# Patient Record
Sex: MALE | Race: WHITE
[De-identification: names, ages, dates, MRNs, and addresses within clinical notes are randomized per-mention and may not be internally consistent; named-entity substitution may affect disease eponyms.]

---

## 2017-08-17 ENCOUNTER — RX ONLY (OUTPATIENT)
Age: 77
Setting detail: RX ONLY
End: 2017-08-17

## 2021-07-27 ENCOUNTER — APPOINTMENT (OUTPATIENT)
Dept: RADIOLOGY | Facility: MEDICAL CENTER | Age: 81
DRG: 189 | End: 2021-07-27
Attending: EMERGENCY MEDICINE
Payer: COMMERCIAL

## 2021-07-27 ENCOUNTER — HOSPITAL ENCOUNTER (INPATIENT)
Facility: MEDICAL CENTER | Age: 81
LOS: 4 days | DRG: 189 | End: 2021-07-31
Attending: EMERGENCY MEDICINE | Admitting: INTERNAL MEDICINE
Payer: COMMERCIAL

## 2021-07-27 DIAGNOSIS — R06.00 DYSPNEA, UNSPECIFIED TYPE: ICD-10-CM

## 2021-07-27 DIAGNOSIS — J96.21 ACUTE ON CHRONIC RESPIRATORY FAILURE WITH HYPOXIA (HCC): ICD-10-CM

## 2021-07-27 DIAGNOSIS — I48.11 LONGSTANDING PERSISTENT ATRIAL FIBRILLATION (HCC): ICD-10-CM

## 2021-07-27 DIAGNOSIS — J96.01 ACUTE RESPIRATORY FAILURE WITH HYPOXIA (HCC): ICD-10-CM

## 2021-07-27 DIAGNOSIS — G47.33 OSA (OBSTRUCTIVE SLEEP APNEA): ICD-10-CM

## 2021-07-27 DIAGNOSIS — J81.0 ACUTE PULMONARY EDEMA (HCC): ICD-10-CM

## 2021-07-27 PROBLEM — J44.1 ACUTE EXACERBATION OF CHRONIC OBSTRUCTIVE PULMONARY DISEASE (COPD) (HCC): Status: ACTIVE | Noted: 2021-07-27

## 2021-07-27 LAB
ALBUMIN SERPL BCP-MCNC: 4.3 G/DL (ref 3.2–4.9)
ALBUMIN/GLOB SERPL: 1.6 G/DL
ALP SERPL-CCNC: 133 U/L (ref 30–99)
ALT SERPL-CCNC: 16 U/L (ref 2–50)
ANION GAP SERPL CALC-SCNC: 17 MMOL/L (ref 7–16)
AST SERPL-CCNC: 17 U/L (ref 12–45)
B-OH-BUTYR SERPL-MCNC: 0.58 MMOL/L (ref 0.02–0.27)
BASE EXCESS BLDA CALC-SCNC: -6 MMOL/L (ref -4–3)
BASOPHILS # BLD AUTO: 1.4 % (ref 0–1.8)
BASOPHILS # BLD: 0.18 K/UL (ref 0–0.12)
BILIRUB SERPL-MCNC: 1.5 MG/DL (ref 0.1–1.5)
BODY TEMPERATURE: ABNORMAL DEGREES
BUN SERPL-MCNC: 16 MG/DL (ref 8–22)
CALCIUM SERPL-MCNC: 8.7 MG/DL (ref 8.5–10.5)
CHLORIDE SERPL-SCNC: 105 MMOL/L (ref 96–112)
CO2 BLDA-SCNC: 20 MMOL/L (ref 20–33)
CO2 SERPL-SCNC: 17 MMOL/L (ref 20–33)
CREAT SERPL-MCNC: 1.02 MG/DL (ref 0.5–1.4)
DELSYS IDSYS: ABNORMAL
EKG IMPRESSION: NORMAL
EOSINOPHIL # BLD AUTO: 0.17 K/UL (ref 0–0.51)
EOSINOPHIL NFR BLD: 1.4 % (ref 0–6.9)
ERYTHROCYTE [DISTWIDTH] IN BLOOD BY AUTOMATED COUNT: 52.8 FL (ref 35.9–50)
FLUAV RNA SPEC QL NAA+PROBE: NEGATIVE
FLUBV RNA SPEC QL NAA+PROBE: NEGATIVE
GLOBULIN SER CALC-MCNC: 2.7 G/DL (ref 1.9–3.5)
GLUCOSE BLD-MCNC: 233 MG/DL (ref 65–99)
GLUCOSE SERPL-MCNC: 262 MG/DL (ref 65–99)
HCO3 BLDA-SCNC: 19.2 MMOL/L (ref 17–25)
HCT VFR BLD AUTO: 46.6 % (ref 42–52)
HGB BLD-MCNC: 14.9 G/DL (ref 14–18)
HOROWITZ INDEX BLDA+IHG-RTO: 228 MM[HG]
IMM GRANULOCYTES # BLD AUTO: 0.07 K/UL (ref 0–0.11)
IMM GRANULOCYTES NFR BLD AUTO: 0.6 % (ref 0–0.9)
LACTATE BLD-SCNC: 2.1 MMOL/L (ref 0.5–2)
LYMPHOCYTES # BLD AUTO: 3.54 K/UL (ref 1–4.8)
LYMPHOCYTES NFR BLD: 28.3 % (ref 22–41)
MCH RBC QN AUTO: 30 PG (ref 27–33)
MCHC RBC AUTO-ENTMCNC: 32 G/DL (ref 33.7–35.3)
MCV RBC AUTO: 93.8 FL (ref 81.4–97.8)
MONOCYTES # BLD AUTO: 1.04 K/UL (ref 0–0.85)
MONOCYTES NFR BLD AUTO: 8.3 % (ref 0–13.4)
NEUTROPHILS # BLD AUTO: 7.49 K/UL (ref 1.82–7.42)
NEUTROPHILS NFR BLD: 60 % (ref 44–72)
NRBC # BLD AUTO: 0 K/UL
NRBC BLD-RTO: 0 /100 WBC
NT-PROBNP SERPL IA-MCNC: 1049 PG/ML (ref 0–125)
O2/TOTAL GAS SETTING VFR VENT: 100 %
PCO2 BLDA: 38 MMHG (ref 26–37)
PH BLDA: 7.31 [PH] (ref 7.4–7.5)
PLATELET # BLD AUTO: 318 K/UL (ref 164–446)
PMV BLD AUTO: 10.8 FL (ref 9–12.9)
PO2 BLDA: 228 MMHG (ref 64–87)
POTASSIUM SERPL-SCNC: 4.4 MMOL/L (ref 3.6–5.5)
PROT SERPL-MCNC: 7 G/DL (ref 6–8.2)
RBC # BLD AUTO: 4.97 M/UL (ref 4.7–6.1)
RSV RNA SPEC QL NAA+PROBE: NEGATIVE
SAO2 % BLDA: 100 % (ref 93–99)
SARS-COV-2 RNA RESP QL NAA+PROBE: NOTDETECTED
SODIUM SERPL-SCNC: 139 MMOL/L (ref 135–145)
SPECIMEN DRAWN FROM PATIENT: ABNORMAL
SPECIMEN SOURCE: NORMAL
TROPONIN T SERPL-MCNC: 14 NG/L (ref 6–19)
WBC # BLD AUTO: 12.5 K/UL (ref 4.8–10.8)

## 2021-07-27 PROCEDURE — 80053 COMPREHEN METABOLIC PANEL: CPT

## 2021-07-27 PROCEDURE — 94640 AIRWAY INHALATION TREATMENT: CPT

## 2021-07-27 PROCEDURE — 700101 HCHG RX REV CODE 250: Performed by: EMERGENCY MEDICINE

## 2021-07-27 PROCEDURE — 5A09357 ASSISTANCE WITH RESPIRATORY VENTILATION, LESS THAN 24 CONSECUTIVE HOURS, CONTINUOUS POSITIVE AIRWAY PRESSURE: ICD-10-PCS | Performed by: INTERNAL MEDICINE

## 2021-07-27 PROCEDURE — 83605 ASSAY OF LACTIC ACID: CPT

## 2021-07-27 PROCEDURE — C9803 HOPD COVID-19 SPEC COLLECT: HCPCS | Performed by: EMERGENCY MEDICINE

## 2021-07-27 PROCEDURE — 99291 CRITICAL CARE FIRST HOUR: CPT | Performed by: INTERNAL MEDICINE

## 2021-07-27 PROCEDURE — 700102 HCHG RX REV CODE 250 W/ 637 OVERRIDE(OP): Performed by: INTERNAL MEDICINE

## 2021-07-27 PROCEDURE — A9270 NON-COVERED ITEM OR SERVICE: HCPCS | Performed by: INTERNAL MEDICINE

## 2021-07-27 PROCEDURE — 700101 HCHG RX REV CODE 250: Performed by: INTERNAL MEDICINE

## 2021-07-27 PROCEDURE — 96367 TX/PROPH/DG ADDL SEQ IV INF: CPT

## 2021-07-27 PROCEDURE — 700111 HCHG RX REV CODE 636 W/ 250 OVERRIDE (IP): Performed by: EMERGENCY MEDICINE

## 2021-07-27 PROCEDURE — 700105 HCHG RX REV CODE 258: Performed by: EMERGENCY MEDICINE

## 2021-07-27 PROCEDURE — 96376 TX/PRO/DX INJ SAME DRUG ADON: CPT

## 2021-07-27 PROCEDURE — 82962 GLUCOSE BLOOD TEST: CPT

## 2021-07-27 PROCEDURE — 700111 HCHG RX REV CODE 636 W/ 250 OVERRIDE (IP)

## 2021-07-27 PROCEDURE — 84484 ASSAY OF TROPONIN QUANT: CPT

## 2021-07-27 PROCEDURE — 96375 TX/PRO/DX INJ NEW DRUG ADDON: CPT

## 2021-07-27 PROCEDURE — 96365 THER/PROPH/DIAG IV INF INIT: CPT

## 2021-07-27 PROCEDURE — 700111 HCHG RX REV CODE 636 W/ 250 OVERRIDE (IP): Performed by: INTERNAL MEDICINE

## 2021-07-27 PROCEDURE — 770022 HCHG ROOM/CARE - ICU (200)

## 2021-07-27 PROCEDURE — 93005 ELECTROCARDIOGRAM TRACING: CPT | Performed by: EMERGENCY MEDICINE

## 2021-07-27 PROCEDURE — 36600 WITHDRAWAL OF ARTERIAL BLOOD: CPT

## 2021-07-27 PROCEDURE — 82803 BLOOD GASES ANY COMBINATION: CPT

## 2021-07-27 PROCEDURE — 83880 ASSAY OF NATRIURETIC PEPTIDE: CPT

## 2021-07-27 PROCEDURE — 82010 KETONE BODYS QUAN: CPT

## 2021-07-27 PROCEDURE — 87040 BLOOD CULTURE FOR BACTERIA: CPT | Mod: 91

## 2021-07-27 PROCEDURE — 0241U HCHG SARS-COV-2 COVID-19 NFCT DS RESP RNA 4 TRGT MIC: CPT

## 2021-07-27 PROCEDURE — 99291 CRITICAL CARE FIRST HOUR: CPT

## 2021-07-27 PROCEDURE — 94660 CPAP INITIATION&MGMT: CPT

## 2021-07-27 PROCEDURE — 85025 COMPLETE CBC W/AUTO DIFF WBC: CPT

## 2021-07-27 PROCEDURE — 71045 X-RAY EXAM CHEST 1 VIEW: CPT

## 2021-07-27 RX ORDER — DEXTROSE MONOHYDRATE 25 G/50ML
50 INJECTION, SOLUTION INTRAVENOUS
Status: DISCONTINUED | OUTPATIENT
Start: 2021-07-27 | End: 2021-07-31 | Stop reason: HOSPADM

## 2021-07-27 RX ORDER — ISOSORBIDE MONONITRATE 30 MG/1
60 TABLET, EXTENDED RELEASE ORAL EVERY MORNING
Status: DISCONTINUED | OUTPATIENT
Start: 2021-07-28 | End: 2021-07-31 | Stop reason: HOSPADM

## 2021-07-27 RX ORDER — FINASTERIDE 5 MG/1
5 TABLET, FILM COATED ORAL DAILY
COMMUNITY

## 2021-07-27 RX ORDER — OMEPRAZOLE 20 MG/1
20 CAPSULE, DELAYED RELEASE ORAL DAILY
Status: DISCONTINUED | OUTPATIENT
Start: 2021-07-28 | End: 2021-07-31 | Stop reason: HOSPADM

## 2021-07-27 RX ORDER — TAMSULOSIN HYDROCHLORIDE 0.4 MG/1
0.4 CAPSULE ORAL DAILY
Status: DISCONTINUED | OUTPATIENT
Start: 2021-07-28 | End: 2021-07-31 | Stop reason: HOSPADM

## 2021-07-27 RX ORDER — CARVEDILOL 12.5 MG/1
12.5 TABLET ORAL 2 TIMES DAILY
Status: DISCONTINUED | OUTPATIENT
Start: 2021-07-27 | End: 2021-07-31 | Stop reason: HOSPADM

## 2021-07-27 RX ORDER — GLIPIZIDE 5 MG/1
5 TABLET ORAL
COMMUNITY
End: 2022-01-20

## 2021-07-27 RX ORDER — IPRATROPIUM BROMIDE AND ALBUTEROL SULFATE 2.5; .5 MG/3ML; MG/3ML
3 SOLUTION RESPIRATORY (INHALATION)
Status: DISCONTINUED | OUTPATIENT
Start: 2021-07-27 | End: 2021-07-29

## 2021-07-27 RX ORDER — FUROSEMIDE 10 MG/ML
20 INJECTION INTRAMUSCULAR; INTRAVENOUS ONCE
Status: COMPLETED | OUTPATIENT
Start: 2021-07-27 | End: 2021-07-27

## 2021-07-27 RX ORDER — OMEPRAZOLE 40 MG/1
40 CAPSULE, DELAYED RELEASE ORAL
COMMUNITY

## 2021-07-27 RX ORDER — NITROGLYCERIN 20 MG/100ML
0-200 INJECTION INTRAVENOUS CONTINUOUS
Status: DISCONTINUED | OUTPATIENT
Start: 2021-07-27 | End: 2021-07-29

## 2021-07-27 RX ORDER — CARVEDILOL 12.5 MG/1
12.5 TABLET ORAL 2 TIMES DAILY
COMMUNITY

## 2021-07-27 RX ORDER — IPRATROPIUM BROMIDE AND ALBUTEROL SULFATE 2.5; .5 MG/3ML; MG/3ML
3 SOLUTION RESPIRATORY (INHALATION)
Status: DISCONTINUED | OUTPATIENT
Start: 2021-07-27 | End: 2021-07-31 | Stop reason: HOSPADM

## 2021-07-27 RX ORDER — INSULIN LISPRO 100 [IU]/ML
2-9 INJECTION, SOLUTION INTRAVENOUS; SUBCUTANEOUS EVERY 6 HOURS
Status: DISCONTINUED | OUTPATIENT
Start: 2021-07-28 | End: 2021-07-31 | Stop reason: HOSPADM

## 2021-07-27 RX ORDER — AZITHROMYCIN 250 MG/1
500 TABLET, FILM COATED ORAL DAILY
Status: DISCONTINUED | OUTPATIENT
Start: 2021-07-28 | End: 2021-07-29

## 2021-07-27 RX ORDER — FINASTERIDE 5 MG/1
5 TABLET, FILM COATED ORAL DAILY
Status: DISCONTINUED | OUTPATIENT
Start: 2021-07-28 | End: 2021-07-31 | Stop reason: HOSPADM

## 2021-07-27 RX ORDER — METHYLPREDNISOLONE SODIUM SUCCINATE 125 MG/2ML
62.5 INJECTION, POWDER, LYOPHILIZED, FOR SOLUTION INTRAMUSCULAR; INTRAVENOUS EVERY 12 HOURS
Status: DISCONTINUED | OUTPATIENT
Start: 2021-07-27 | End: 2021-07-29

## 2021-07-27 RX ORDER — FUROSEMIDE 10 MG/ML
40 INJECTION INTRAMUSCULAR; INTRAVENOUS ONCE
Status: COMPLETED | OUTPATIENT
Start: 2021-07-27 | End: 2021-07-27

## 2021-07-27 RX ORDER — ATORVASTATIN CALCIUM 40 MG/1
40 TABLET, FILM COATED ORAL NIGHTLY
Status: DISCONTINUED | OUTPATIENT
Start: 2021-07-27 | End: 2021-07-31 | Stop reason: HOSPADM

## 2021-07-27 RX ORDER — TAMSULOSIN HYDROCHLORIDE 0.4 MG/1
0.4 CAPSULE ORAL DAILY
COMMUNITY
End: 2022-01-20

## 2021-07-27 RX ORDER — AMLODIPINE BESYLATE 10 MG/1
10 TABLET ORAL
Status: DISCONTINUED | OUTPATIENT
Start: 2021-07-28 | End: 2021-07-31 | Stop reason: HOSPADM

## 2021-07-27 RX ORDER — NITROGLYCERIN 20 MG/100ML
INJECTION INTRAVENOUS
Status: COMPLETED
Start: 2021-07-27 | End: 2021-07-27

## 2021-07-27 RX ORDER — ATORVASTATIN CALCIUM 40 MG/1
40 TABLET, FILM COATED ORAL NIGHTLY
COMMUNITY

## 2021-07-27 RX ORDER — AZITHROMYCIN 500 MG/5ML
500 INJECTION, POWDER, LYOPHILIZED, FOR SOLUTION INTRAVENOUS ONCE
Status: COMPLETED | OUTPATIENT
Start: 2021-07-27 | End: 2021-07-27

## 2021-07-27 RX ADMIN — APIXABAN 5 MG: 5 TABLET, FILM COATED ORAL at 23:27

## 2021-07-27 RX ADMIN — NITROGLYCERIN 50 MCG/MIN: 20 INJECTION INTRAVENOUS at 17:55

## 2021-07-27 RX ADMIN — FUROSEMIDE 40 MG: 10 INJECTION, SOLUTION INTRAMUSCULAR; INTRAVENOUS at 21:02

## 2021-07-27 RX ADMIN — FUROSEMIDE 20 MG: 10 INJECTION, SOLUTION INTRAVENOUS at 19:24

## 2021-07-27 RX ADMIN — CARVEDILOL 12.5 MG: 12.5 TABLET, FILM COATED ORAL at 23:27

## 2021-07-27 RX ADMIN — ATORVASTATIN CALCIUM 40 MG: 40 TABLET, FILM COATED ORAL at 23:27

## 2021-07-27 RX ADMIN — AZITHROMYCIN MONOHYDRATE 500 MG: 500 INJECTION, POWDER, LYOPHILIZED, FOR SOLUTION INTRAVENOUS at 18:56

## 2021-07-27 RX ADMIN — IPRATROPIUM BROMIDE AND ALBUTEROL SULFATE 3 ML: .5; 2.5 SOLUTION RESPIRATORY (INHALATION) at 23:14

## 2021-07-27 RX ADMIN — METHYLPREDNISOLONE SODIUM SUCCINATE 62.5 MG: 125 INJECTION, POWDER, FOR SOLUTION INTRAMUSCULAR; INTRAVENOUS at 21:02

## 2021-07-27 RX ADMIN — CEFTRIAXONE SODIUM 2 G: 10 INJECTION, POWDER, FOR SOLUTION INTRAVENOUS at 18:04

## 2021-07-27 ASSESSMENT — LIFESTYLE VARIABLES
AVERAGE NUMBER OF DAYS PER WEEK YOU HAVE A DRINK CONTAINING ALCOHOL: 3
TOTAL SCORE: 0
EVER HAD A DRINK FIRST THING IN THE MORNING TO STEADY YOUR NERVES TO GET RID OF A HANGOVER: NO
HOW MANY TIMES IN THE PAST YEAR HAVE YOU HAD 5 OR MORE DRINKS IN A DAY: 2
HAVE PEOPLE ANNOYED YOU BY CRITICIZING YOUR DRINKING: NO
DO YOU DRINK ALCOHOL: NO
TOTAL SCORE: 0
ALCOHOL_USE: YES
ON A TYPICAL DAY WHEN YOU DRINK ALCOHOL HOW MANY DRINKS DO YOU HAVE: 0
CONSUMPTION TOTAL: POSITIVE
TOTAL SCORE: 0
DOES PATIENT WANT TO STOP DRINKING: NO
HAVE YOU EVER FELT YOU SHOULD CUT DOWN ON YOUR DRINKING: NO
EVER FELT BAD OR GUILTY ABOUT YOUR DRINKING: NO

## 2021-07-27 ASSESSMENT — PATIENT HEALTH QUESTIONNAIRE - PHQ9
1. LITTLE INTEREST OR PLEASURE IN DOING THINGS: NOT AT ALL
2. FEELING DOWN, DEPRESSED, IRRITABLE, OR HOPELESS: NOT AT ALL
SUM OF ALL RESPONSES TO PHQ9 QUESTIONS 1 AND 2: 0
2. FEELING DOWN, DEPRESSED, IRRITABLE, OR HOPELESS: NOT AT ALL
SUM OF ALL RESPONSES TO PHQ9 QUESTIONS 1 AND 2: 0
1. LITTLE INTEREST OR PLEASURE IN DOING THINGS: NOT AT ALL

## 2021-07-27 ASSESSMENT — ENCOUNTER SYMPTOMS
FEVER: 0
LOSS OF CONSCIOUSNESS: 0
COUGH: 0
VOMITING: 0
ORTHOPNEA: 1
SHORTNESS OF BREATH: 1
PALPITATIONS: 0
CHILLS: 0
NAUSEA: 0
ABDOMINAL PAIN: 0
WEAKNESS: 0

## 2021-07-27 ASSESSMENT — COGNITIVE AND FUNCTIONAL STATUS - GENERAL
SUGGESTED CMS G CODE MODIFIER MOBILITY: CI
MOBILITY SCORE: 23
SUGGESTED CMS G CODE MODIFIER DAILY ACTIVITY: CH
DAILY ACTIVITIY SCORE: 24
CLIMB 3 TO 5 STEPS WITH RAILING: A LITTLE

## 2021-07-27 ASSESSMENT — COPD QUESTIONNAIRES
DURING THE PAST 4 WEEKS HOW MUCH DID YOU FEEL SHORT OF BREATH: SOME OF THE TIME
COPD SCREENING SCORE: 6
HAVE YOU SMOKED AT LEAST 100 CIGARETTES IN YOUR ENTIRE LIFE: YES
DO YOU EVER COUGH UP ANY MUCUS OR PHLEGM?: NO/ONLY WITH OCCASIONAL COLDS OR INFECTIONS

## 2021-07-27 ASSESSMENT — PULMONARY FUNCTION TESTS: EPAP_CMH2O: 5

## 2021-07-27 ASSESSMENT — FIBROSIS 4 INDEX: FIB4 SCORE: 1.06

## 2021-07-28 ENCOUNTER — APPOINTMENT (OUTPATIENT)
Dept: CARDIOLOGY | Facility: MEDICAL CENTER | Age: 81
DRG: 189 | End: 2021-07-28
Attending: INTERNAL MEDICINE
Payer: COMMERCIAL

## 2021-07-28 PROBLEM — Z86.711 HX OF PULMONARY EMBOLUS: Status: ACTIVE | Noted: 2021-07-28

## 2021-07-28 PROBLEM — J96.21 ACUTE ON CHRONIC RESPIRATORY FAILURE WITH HYPOXIA (HCC): Status: ACTIVE | Noted: 2021-07-27

## 2021-07-28 LAB
ANION GAP SERPL CALC-SCNC: 18 MMOL/L (ref 7–16)
BUN SERPL-MCNC: 20 MG/DL (ref 8–22)
CALCIUM SERPL-MCNC: 9 MG/DL (ref 8.5–10.5)
CHLORIDE SERPL-SCNC: 103 MMOL/L (ref 96–112)
CO2 SERPL-SCNC: 18 MMOL/L (ref 20–33)
CREAT SERPL-MCNC: 1.05 MG/DL (ref 0.5–1.4)
ERYTHROCYTE [DISTWIDTH] IN BLOOD BY AUTOMATED COUNT: 50.1 FL (ref 35.9–50)
GLUCOSE BLD-MCNC: 147 MG/DL (ref 65–99)
GLUCOSE BLD-MCNC: 156 MG/DL (ref 65–99)
GLUCOSE BLD-MCNC: 202 MG/DL (ref 65–99)
GLUCOSE BLD-MCNC: 259 MG/DL (ref 65–99)
GLUCOSE SERPL-MCNC: 161 MG/DL (ref 65–99)
HCT VFR BLD AUTO: 42.5 % (ref 42–52)
HGB BLD-MCNC: 14.1 G/DL (ref 14–18)
LV EJECT FRACT  99904: 55
LV EJECT FRACT MOD 2C 99903: 72.53
LV EJECT FRACT MOD 4C 99902: 44.84
LV EJECT FRACT MOD BP 99901: 58.74
MAGNESIUM SERPL-MCNC: 1.7 MG/DL (ref 1.5–2.5)
MCH RBC QN AUTO: 30 PG (ref 27–33)
MCHC RBC AUTO-ENTMCNC: 33.2 G/DL (ref 33.7–35.3)
MCV RBC AUTO: 90.4 FL (ref 81.4–97.8)
PLATELET # BLD AUTO: 184 K/UL (ref 164–446)
PMV BLD AUTO: 10.6 FL (ref 9–12.9)
POTASSIUM SERPL-SCNC: 3.9 MMOL/L (ref 3.6–5.5)
RBC # BLD AUTO: 4.7 M/UL (ref 4.7–6.1)
SODIUM SERPL-SCNC: 139 MMOL/L (ref 135–145)
WBC # BLD AUTO: 6.7 K/UL (ref 4.8–10.8)

## 2021-07-28 PROCEDURE — 770022 HCHG ROOM/CARE - ICU (200)

## 2021-07-28 PROCEDURE — 700102 HCHG RX REV CODE 250 W/ 637 OVERRIDE(OP): Performed by: INTERNAL MEDICINE

## 2021-07-28 PROCEDURE — A9270 NON-COVERED ITEM OR SERVICE: HCPCS | Performed by: INTERNAL MEDICINE

## 2021-07-28 PROCEDURE — 93306 TTE W/DOPPLER COMPLETE: CPT

## 2021-07-28 PROCEDURE — 94640 AIRWAY INHALATION TREATMENT: CPT

## 2021-07-28 PROCEDURE — 83735 ASSAY OF MAGNESIUM: CPT

## 2021-07-28 PROCEDURE — 700111 HCHG RX REV CODE 636 W/ 250 OVERRIDE (IP): Performed by: INTERNAL MEDICINE

## 2021-07-28 PROCEDURE — 85027 COMPLETE CBC AUTOMATED: CPT

## 2021-07-28 PROCEDURE — 80048 BASIC METABOLIC PNL TOTAL CA: CPT

## 2021-07-28 PROCEDURE — 94664 DEMO&/EVAL PT USE INHALER: CPT

## 2021-07-28 PROCEDURE — 700101 HCHG RX REV CODE 250: Performed by: INTERNAL MEDICINE

## 2021-07-28 PROCEDURE — 82962 GLUCOSE BLOOD TEST: CPT

## 2021-07-28 PROCEDURE — 93306 TTE W/DOPPLER COMPLETE: CPT | Mod: 26 | Performed by: INTERNAL MEDICINE

## 2021-07-28 PROCEDURE — 99291 CRITICAL CARE FIRST HOUR: CPT | Performed by: INTERNAL MEDICINE

## 2021-07-28 RX ORDER — POTASSIUM CHLORIDE 20 MEQ/1
40 TABLET, EXTENDED RELEASE ORAL ONCE
Status: COMPLETED | OUTPATIENT
Start: 2021-07-28 | End: 2021-07-28

## 2021-07-28 RX ORDER — MAGNESIUM SULFATE HEPTAHYDRATE 40 MG/ML
2 INJECTION, SOLUTION INTRAVENOUS ONCE
Status: COMPLETED | OUTPATIENT
Start: 2021-07-28 | End: 2021-07-28

## 2021-07-28 RX ORDER — FUROSEMIDE 10 MG/ML
40 INJECTION INTRAMUSCULAR; INTRAVENOUS
Status: DISCONTINUED | OUTPATIENT
Start: 2021-07-28 | End: 2021-07-29

## 2021-07-28 RX ADMIN — INSULIN LISPRO 3 UNITS: 100 INJECTION, SOLUTION INTRAVENOUS; SUBCUTANEOUS at 17:50

## 2021-07-28 RX ADMIN — IPRATROPIUM BROMIDE AND ALBUTEROL SULFATE 3 ML: .5; 2.5 SOLUTION RESPIRATORY (INHALATION) at 19:53

## 2021-07-28 RX ADMIN — OMEPRAZOLE 20 MG: 20 CAPSULE, DELAYED RELEASE ORAL at 05:49

## 2021-07-28 RX ADMIN — IPRATROPIUM BROMIDE AND ALBUTEROL SULFATE 3 ML: .5; 2.5 SOLUTION RESPIRATORY (INHALATION) at 02:57

## 2021-07-28 RX ADMIN — POTASSIUM CHLORIDE 40 MEQ: 1500 TABLET, EXTENDED RELEASE ORAL at 11:29

## 2021-07-28 RX ADMIN — INSULIN GLARGINE 18 UNITS: 100 INJECTION, SOLUTION SUBCUTANEOUS at 17:49

## 2021-07-28 RX ADMIN — IPRATROPIUM BROMIDE AND ALBUTEROL SULFATE 3 ML: .5; 2.5 SOLUTION RESPIRATORY (INHALATION) at 10:03

## 2021-07-28 RX ADMIN — CARVEDILOL 12.5 MG: 12.5 TABLET, FILM COATED ORAL at 05:49

## 2021-07-28 RX ADMIN — MAGNESIUM SULFATE 2 G: 2 INJECTION INTRAVENOUS at 11:33

## 2021-07-28 RX ADMIN — INSULIN LISPRO 2 UNITS: 100 INJECTION, SOLUTION INTRAVENOUS; SUBCUTANEOUS at 06:03

## 2021-07-28 RX ADMIN — FUROSEMIDE 40 MG: 10 INJECTION, SOLUTION INTRAMUSCULAR; INTRAVENOUS at 11:39

## 2021-07-28 RX ADMIN — CARVEDILOL 12.5 MG: 12.5 TABLET, FILM COATED ORAL at 17:49

## 2021-07-28 RX ADMIN — AMLODIPINE BESYLATE 10 MG: 10 TABLET ORAL at 05:50

## 2021-07-28 RX ADMIN — IPRATROPIUM BROMIDE AND ALBUTEROL SULFATE 3 ML: .5; 2.5 SOLUTION RESPIRATORY (INHALATION) at 06:31

## 2021-07-28 RX ADMIN — METHYLPREDNISOLONE SODIUM SUCCINATE 62.5 MG: 125 INJECTION, POWDER, FOR SOLUTION INTRAMUSCULAR; INTRAVENOUS at 05:50

## 2021-07-28 RX ADMIN — ATORVASTATIN CALCIUM 40 MG: 40 TABLET, FILM COATED ORAL at 21:35

## 2021-07-28 RX ADMIN — INSULIN GLARGINE 18 UNITS: 100 INJECTION, SOLUTION SUBCUTANEOUS at 00:13

## 2021-07-28 RX ADMIN — TAMSULOSIN HYDROCHLORIDE 0.4 MG: 0.4 CAPSULE ORAL at 05:49

## 2021-07-28 RX ADMIN — APIXABAN 5 MG: 5 TABLET, FILM COATED ORAL at 05:49

## 2021-07-28 RX ADMIN — INSULIN LISPRO 5 UNITS: 100 INJECTION, SOLUTION INTRAVENOUS; SUBCUTANEOUS at 11:44

## 2021-07-28 RX ADMIN — METHYLPREDNISOLONE SODIUM SUCCINATE 62.5 MG: 125 INJECTION, POWDER, FOR SOLUTION INTRAMUSCULAR; INTRAVENOUS at 17:49

## 2021-07-28 RX ADMIN — IPRATROPIUM BROMIDE AND ALBUTEROL SULFATE 3 ML: .5; 2.5 SOLUTION RESPIRATORY (INHALATION) at 22:19

## 2021-07-28 RX ADMIN — APIXABAN 5 MG: 5 TABLET, FILM COATED ORAL at 17:49

## 2021-07-28 RX ADMIN — ISOSORBIDE MONONITRATE 60 MG: 30 TABLET, EXTENDED RELEASE ORAL at 05:48

## 2021-07-28 RX ADMIN — FINASTERIDE 5 MG: 5 TABLET, FILM COATED ORAL at 05:49

## 2021-07-28 RX ADMIN — AZITHROMYCIN MONOHYDRATE 500 MG: 250 TABLET ORAL at 05:49

## 2021-07-28 ASSESSMENT — ENCOUNTER SYMPTOMS
SHORTNESS OF BREATH: 1
DIZZINESS: 0
ABDOMINAL PAIN: 0
NAUSEA: 0
DIARRHEA: 0
FEVER: 0
BACK PAIN: 0
NECK PAIN: 0
EYE PAIN: 0
CHILLS: 0
WHEEZING: 0
EYE DISCHARGE: 0
FOCAL WEAKNESS: 0
NERVOUS/ANXIOUS: 0
ORTHOPNEA: 1
SORE THROAT: 0
COUGH: 0
BRUISES/BLEEDS EASILY: 0
HEADACHES: 0
DEPRESSION: 0
VOMITING: 0

## 2021-07-28 ASSESSMENT — FIBROSIS 4 INDEX: FIB4 SCORE: 1.82

## 2021-07-28 ASSESSMENT — PAIN DESCRIPTION - PAIN TYPE
TYPE: ACUTE PAIN

## 2021-07-28 ASSESSMENT — PATIENT HEALTH QUESTIONNAIRE - PHQ9
2. FEELING DOWN, DEPRESSED, IRRITABLE, OR HOPELESS: NOT AT ALL
SUM OF ALL RESPONSES TO PHQ9 QUESTIONS 1 AND 2: 0
1. LITTLE INTEREST OR PLEASURE IN DOING THINGS: NOT AT ALL

## 2021-07-28 NOTE — ASSESSMENT & PLAN NOTE
? CHF exacerbation vs COPD exacerbation  No recent records, [facility]  Cont HFNC with improved O2 and continue to wean  Cont diuresis with Lasix 40mg IV daily  Check ECHO  May need to consider CT chest to view lung parenchyma-->?emphysema, Hx of PE, ILD  Switch steroids to oral prednisone 40mg with a fast taper  RT protocols  ECHO with moderate aortic insufficiency  ?CT chest without contrast  Would suggest that pulmonary follow patient inpatient and see as an outpatient-->should get PFTs once discharged

## 2021-07-28 NOTE — ED NOTES
Med rec updated and complete.  Allergies reviewed. VIA interview with pt at bedside. Pt denies antibiotic use in last 30 days.        Home pharmacy [phone]             Current Outpatient Medications on File Prior to Encounter   Medication Sig Dispense Refill   • carvedilol (COREG) 12.5 MG Tab Take 12.5 mg by mouth 2 times a day.     • atorvastatin (LIPITOR) 40 MG Tab Take 40 mg by mouth every evening.     • apixaban (ELIQUIS) 5mg Tab Take 5 mg by mouth 2 times a day.     • omeprazole (PRILOSEC) 20 MG delayed-release capsule Take 20 mg by mouth every day.     • tamsulosin (FLOMAX) 0.4 MG capsule Take 0.4 mg by mouth every day.     • glipiZIDE (GLUCOTROL) 5 MG Tab Take 5 mg by mouth before evening meal.     • finasteride (PROSCAR) 5 MG Tab Take 5 mg by mouth every day.     • Empagliflozin 25 MG Tab Take 25 mg by mouth every day.                          • amlodipine (NORVASC) 10 MG TABS TAKE 1 TABLET EVERY EVENING 90 Each 3                               • isosorbide mononitrate SR (IMDUR) 60 MG TB24 Take 1 Tab by mouth every morning. Indications: Chronic Angina Pectoris 90 Each 3

## 2021-07-28 NOTE — ED NOTES
Report to LARON Carrillo. RRT at bedside to assist in transporting pt to [facility].

## 2021-07-28 NOTE — CARE PLAN
Problem: Knowledge Deficit - Standard  Goal: Patient and family/care givers will demonstrate understanding of plan of care, disease process/condition, diagnostic tests and medications  Outcome: Progressing     Problem: Respiratory  Goal: Patient will achieve/maintain optimum respiratory ventilation and gas exchange  Outcome: Progressing     The patient is Watcher - Medium risk of patient condition declining or worsening         Progress made toward(s) clinical / shift goals:  wean off high flow    Patient is not progressing towards the following goals: Remains on high flow

## 2021-07-28 NOTE — PROGRESS NOTES
Critical Care Progress Note    Date of admission  7/27/2021    Chief Complaint  81 y.o. male admitted 7/27/2021 with acute on chronic hypoxic respiratory failure    Hospital Course  Mr. Cook is a 81 year old male with the past medical history significant for a-fib on Apixiban, DVT/PE, hypertension, CKD, chronic hypoxic respiratory failure on 3 lpm NC O2 and dyslipidemia who presented to the ER on 7/27/2021 with complaints of worsening shortness of breath over the past few days that worsened on the day of admission.  EMS was contacted and upon arrival found that the patient's O2 saturation was 70%.  The patient did not tolerate BiPAP, but improved with HFNC.  He was admitted to the ICU for further care and management.      Interval Problem Update  Reviewed last 24 hour events:   - no issues overnight after transferring to ICU   - a/ox4   - a-fib 70-100s   - -140s   - afebrile   - DM diet   - UOP of 1 liters with urinal   - HFNC 30/60%   - CXR(reviewed): bibasilar opacities R>L   - apixiban   - azithromycin for 3 days   - solumedrol 60 mg BID   - K 3.9   - Mg 1.7       Review of Systems  Review of Systems   Constitutional: Positive for malaise/fatigue. Negative for chills and fever.   HENT: Negative for congestion and sore throat.    Eyes: Negative for pain and discharge.   Respiratory: Positive for shortness of breath. Negative for cough and wheezing.    Cardiovascular: Positive for orthopnea.   Gastrointestinal: Negative for abdominal pain, diarrhea, nausea and vomiting.   Genitourinary: Negative for frequency and hematuria.   Musculoskeletal: Negative for back pain and neck pain.   Skin: Negative for rash.   Neurological: Negative for dizziness, focal weakness and headaches.   Endo/Heme/Allergies: Does not bruise/bleed easily.   Psychiatric/Behavioral: Negative for depression. The patient is not nervous/anxious.         Vital Signs for last 24 hours   Temp:  [36 °C (96.8 °F)-36.2 °C (97.2 °F)] 36.1 °C (97  °F)  Pulse:  [74-96] 85  Resp:  [8-38] 17  BP: ()/() 147/82  SpO2:  [88 %-98 %] 94 %    Hemodynamic parameters for last 24 hours       Respiratory Information for the last 24 hours       Physical Exam   Physical Exam  Vitals and nursing note reviewed.   Constitutional:       General: He is not in acute distress.     Appearance: He is ill-appearing. He is not toxic-appearing.      Comments: Speaking in full sentences while on HFNC   HENT:      Head: Normocephalic and atraumatic.      Right Ear: External ear normal.      Left Ear: External ear normal.      Nose: Nose normal. No rhinorrhea.      Comments: HFNC in place     Mouth/Throat:      Mouth: Mucous membranes are moist.      Pharynx: Oropharynx is clear. No oropharyngeal exudate.   Eyes:      General: No scleral icterus.     Conjunctiva/sclera: Conjunctivae normal.      Pupils: Pupils are equal, round, and reactive to light.   Cardiovascular:      Rate and Rhythm: Normal rate and regular rhythm.      Pulses: Normal pulses.      Heart sounds: Normal heart sounds. No murmur heard.     Pulmonary:      Breath sounds: Rales present. No wheezing.      Comments: Tolerating HFNC, bibasilar crackles noted  Chest:      Chest wall: No tenderness.   Abdominal:      General: Bowel sounds are normal. There is no distension.      Palpations: Abdomen is soft.      Tenderness: There is no abdominal tenderness. There is no guarding.   Musculoskeletal:         General: Normal range of motion.      Cervical back: Normal range of motion and neck supple.      Right lower leg: No edema.      Left lower leg: No edema.   Lymphadenopathy:      Cervical: No cervical adenopathy.   Skin:     General: Skin is warm and dry.      Capillary Refill: Capillary refill takes less than 2 seconds.      Findings: No rash.   Neurological:      Mental Status: He is alert and oriented to person, place, and time.      Cranial Nerves: No cranial nerve deficit.      Sensory: No sensory deficit.       Motor: No weakness.   Psychiatric:         Mood and Affect: Mood normal.         Behavior: Behavior normal.         Thought Content: Thought content normal.         Medications  Current Facility-Administered Medications   Medication Dose Route Frequency Provider Last Rate Last Admin   • nitroglycerin 50 mg in D5W 250 ml infusion  0-200 mcg/min Intravenous Continuous Daniel Wyatt M.D.   Stopped at 07/27/21 1835   • methylPREDNISolone sod succ (SOLU-MEDROL) 125 MG injection 62.5 mg  62.5 mg Intravenous Q12HRS Grant Artis, D.O.   62.5 mg at 07/28/21 0550   • azithromycin (ZITHROMAX) tablet 500 mg  500 mg Oral DAILY Grant Artis D.O.   500 mg at 07/28/21 0549   • amLODIPine (NORVASC) tablet 10 mg  10 mg Oral Q DAY Reggie Matson M.D.   10 mg at 07/28/21 0550   • apixaban (ELIQUIS) tablet 5 mg  5 mg Oral BID Reggie Matson M.D.   5 mg at 07/28/21 0549   • atorvastatin (LIPITOR) tablet 40 mg  40 mg Oral Nightly Reggie Matson M.D.   40 mg at 07/27/21 2327   • carvedilol (COREG) tablet 12.5 mg  12.5 mg Oral BID Reggie Matson M.D.   12.5 mg at 07/28/21 0549   • finasteride (PROSCAR) tablet 5 mg  5 mg Oral DAILY Reggie Mtason M.D.   5 mg at 07/28/21 0549   • isosorbide mononitrate SR (IMDUR) tablet 60 mg  60 mg Oral QAM Reggie Matson M.D.   60 mg at 07/28/21 0548   • omeprazole (PRILOSEC) capsule 20 mg  20 mg Oral DAILY Reggie Matson M.D.   20 mg at 07/28/21 0549   • tamsulosin (FLOMAX) capsule 0.4 mg  0.4 mg Oral DAILY Reggie Matson M.D.   0.4 mg at 07/28/21 0549   • ipratropium-albuterol (DUONEB) nebulizer solution  3 mL Nebulization Q4HRS (RT) Grant Artis D.O.   3 mL at 07/28/21 0631   • ipratropium-albuterol (DUONEB) nebulizer solution  3 mL Nebulization Q2HRS PRN (RT) Grant Artis D.O.       • insulin glargine (Semglee) injection  0.2 Units/kg/day Subcutaneous Q EVENING Reggie Matson M.D.   18 Units at 07/28/21 0013    And   • insulin lispro (AdmeLOG) injection  2-9 Units Subcutaneous Q6HRS Reggie CABRERA  ROM Matson   2 Units at 07/28/21 0603    And   • glucose 4 g chewable tablet 16 g  16 g Oral Q15 MIN PRN Reggie Matson M.D.        And   • dextrose 50% (D50W) injection 50 mL  50 mL Intravenous Q15 MIN PRN Reggie Matson M.D.           Fluids    Intake/Output Summary (Last 24 hours) at 7/28/2021 0700  Last data filed at 7/28/2021 0600  Gross per 24 hour   Intake --   Output 2300 ml   Net -2300 ml       Laboratory  Recent Labs     07/27/21  1833   ISTATAPH 7.313*   ISTATAPCO2 38.0*   ISTATAPO2 228*   ISTATATCO2 20   MOVHKHD6LLN 100*   ISTATARTHCO3 19.2   ISTATARTBE -6*   ISTATTEMP see below   ISTATFIO2 100   ISTATSPEC Arterial         Recent Labs     07/27/21  1750 07/28/21  0520   SODIUM 139 139   POTASSIUM 4.4 3.9   CHLORIDE 105 103   CO2 17* 18*   BUN 16 20   CREATININE 1.02 1.05   MAGNESIUM  --  1.7   CALCIUM 8.7 9.0     Recent Labs     07/27/21  1750 07/28/21  0520   ALTSGPT 16  --    ASTSGOT 17  --    ALKPHOSPHAT 133*  --    TBILIRUBIN 1.5  --    GLUCOSE 262* 161*     Recent Labs     07/27/21  1750 07/28/21  0520   WBC 12.5* 6.7   NEUTSPOLYS 60.00  --    LYMPHOCYTES 28.30  --    MONOCYTES 8.30  --    EOSINOPHILS 1.40  --    BASOPHILS 1.40  --    ASTSGOT 17  --    ALTSGPT 16  --    ALKPHOSPHAT 133*  --    TBILIRUBIN 1.5  --      Recent Labs     07/27/21  1750 07/28/21  0520   RBC 4.97 4.70   HEMOGLOBIN 14.9 14.1   HEMATOCRIT 46.6 42.5   PLATELETCT 318 184       Imaging  Reviewed    Assessment/Plan  Hx of pulmonary embolus- (present on admission)  Assessment & Plan  Hx of   Cont Apixiban    Acute on chronic respiratory failure with hypoxia (HCC)- (present on admission)  Assessment & Plan  ? CHF exacerbation vs COPD exacerbation  No recent records, [facility]  Cont HFNC at 30/60% and wean if tolerates  Cont diuresis with Lasix 40mg IV daily  Check ECHO  May need to consider CT chest to view lung parenchyma-->?emphysema, Hx of PE, ILD  Cont steroids, empiric abx, RT protocols,  Duonebs    A-fib (HCC)- (present on admission)  Assessment & Plan  Rate controlled  Cont Apixiban    Mixed hyperlipidemia- (present on admission)  Assessment & Plan  Cont home statin    Type II or unspecified type diabetes mellitus with renal manifestations, not stated as uncontrolled(250.40)  Assessment & Plan  BG goals 140-180s  Lantus 18 units nightly  ISS    Essential hypertension, malignant- (present on admission)  Assessment & Plan  Cont home medications of amlodipine, carvedilol, imdur       VTE:  NOAC  Ulcer: Not Indicated  Lines: PIVs    I have performed a physical exam and reviewed and updated ROS and Plan today (7/28/2021). In review of yesterday's note (7/27/2021), there are no changes except as documented above.     Discussed patient condition and risk of morbidity and/or mortality with RN, RT, Pharmacy, , Charge nurse / hot rounds and Patient    Patient remains critically ill with acute on chronic hypoxic respiratory failure requiring active titration of high flow nasal cannula to maintain oxygen saturations greater than 92%.  We will start aggressive diuresis as well as continue antibiotics and duo nebs.  Patient remains at high risk of clinical deterioration, worsening vital organ dysfunction, and death without the above critical care interventions.    Critical care time = 41 minutes in directly providing and coordinating critical care and extensive data review.  No time overlap and excludes procedures.

## 2021-07-28 NOTE — RESPIRATORY CARE
COPD EDUCATION by COPD CLINICAL EDUCATOR  2021  at  9:42 AM by Olga Lidia Montano, RRT     Patient interviewed by COPD education team.  Patient unable to participate in full program.  A short intervention has been conducted.  A comprehensive packet including information about COPD, types of treatments to manage their disease and safe home Oxygen usage was provided and reviewed with patient at the bedside. Patient states he was a  for occupation and wore protective gear. Patient also states he used to have an albuterol inhaler without a spacer and used it when he was short of breath prior to admission, however it was .     COPD Screen  COPD Risk Screening  Do you have a history of COPD?: Yes  Do you have a Pulmonologist?: No  COPD Population Screener  During the past 4 weeks, how much did you feel short of breath?: Some of the time  Do you ever cough up any mucus or phlegm?: No/only with occasional colds or infections  In the past 12 months, you do less than you used to because of your breathing problems: Agree  Have you smoked at least 100 cigarettes in your entire life?: Yes  How old are you?: 60+  COPD Screening Score: 6  COPD Coordinator Recommended: Yes    COPD Assessment  COPD Clinical Specialists ONLY  COPD Education Initiated: Yes--Short Intervention (patient unsure if he has COPD. information provided and voalted MD for clarity on exacerbation. [facility],)  DME Company: [employer]  DME Equipment Type: concentrator/oxygen  [facility]  [facility]- patient has medic[facility] insurance and not interested in follow up with [facility] Pulmonary/remote monitoring  Referrals Initiated: Yes  Pulmonary Rehab:  (notified MD for order set)  Smoking Cessation: N/A (quit smoking in )  Hospice: N/A  Home Health Care: Declined  [facility] Outreach: N/A  [facility]: N/A  [facility]  Private In-Home Care Agency: N/A  Is this a COPD exacerbation  "patient?: Yes  $ Demo/Eval of SVN's, MDI's and Aerosols: Yes  (OP) Pulmonary Function Testing:  (patient states [facility]. told it was \"normal\")    Meds to Beds  Would the patient like to opt in for Bedside Medication Delivery at Discharge?: Yes, interested     MY COPD ACTION PLAN     It is recommended that patients and physicians /healthcare providers complete this action plan together. This plan should be discussed at each physician visit and updated as needed.    The green, yellow and red zones show groups of symptoms of COPD. This list of symptoms is not comprehensive, and you may experience other symptoms. In the \"Actions\" column, your healthcare provider has recommended actions for you to take based on your symptoms.    Patient Name: Oleg Cook   YOB: 1940   Last Updated on: 7/28/2021  9:41 AM   Green Zone:  I am doing well today Actions   •  Usual activitiy and exercise level •  Take daily medications   •  Usual amounts of cough and phlegm/mucus •  Use oxygen as prescribed   •  Sleep well at night •  Continue regular exercise/diet plan   •  Appetite is good •  At all times avoid cigarette smoke, inhaled irritants     Daily Medications (these medications are taken every day):                Yellow Zone:  I am having a bad day or a COPD flare Actions   •  More breathless than usual •  Continue daily medications   •  I have less energy for my daily activities •  Use quick relief inhaler as ordered   •  Increased or thicker phlegm/mucus •  Use oxygen as prescribed   •  Using quick relief inhaler/nebulizer more often •  Get plenty of rest   •  Swelling of ankles more than usual •  Use pursed lip breathing   •  More coughing than usual •  At all times avoid cigarette smoke, inhaled irritants   •  I feel like I have a \"chest cold\"   •  Poor sleep and my symptoms woke me up   •  My appetite is not good   •  My medicine is not helping    •  Call provider immediately if symptoms " don’t improve     Continue daily medications, add rescue medications:   Albuterol 2 Puffs Every 4 hours PRN       Medications to be used during a flare up, (as Discussed with Provider):           Additional Information:  Use with spacer    Red Zone:  I need urgent medical care Actions   •  Severe shortness of breath even at rest •  Call 911 or seek medical care immediately   •  Not able to do any activity because of breathing    •  Fever or shaking chills    •  Feeling confused or very drowsy     •  Chest pains    •  Coughing up blood
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
## 2021-07-28 NOTE — ED NOTES
Chief Complaint   Patient presents with   • Shortness of Breath     x2days     Pt bib ems from home, c/o sob x2days. Pt on 4L nc which family has been increasing from 2L , spo2 77% . He was placed on 15L non re breather . EMS attempted to place pt ot cpap but pt did not tolerate.    at bedside.

## 2021-07-28 NOTE — HOSPITAL COURSE
Mr. Cook is a 81 year old male with the past medical history significant for a-fib on Apixiban, DVT/PE, hypertension, CKD, chronic hypoxic respiratory failure on 3 lpm NC O2 and dyslipidemia who presented to the ER on 7/27/2021 with complaints of worsening shortness of breath over the past few days that worsened on the day of admission.  EMS was contacted and upon arrival found that the patient's O2 saturation was 70%.  The patient did not tolerate BiPAP, but improved with HFNC.  He was admitted to the ICU for further care and management.

## 2021-07-28 NOTE — ED PROVIDER NOTES
ED Provider Note    Scribed for Daniel Wyatt M.D. by Nicole Em. 2021, 5:54 PM.    Primary care provider: Shana Farrell M.D.  Means of arrival: EMS  History obtained from: patient   History limited by: none    CHIEF COMPLAINT  Chief Complaint   Patient presents with   • Shortness of Breath     x2days       HPI  Oleg Cook is a 81 y.o. male who presents to the Emergency Department for shortness of breath onset 2 days ago. Upon arrival of EMS the patient was cyanotic with an oxygen saturation of 70% on his regular 3 L of oxygen. He has a past medical history of CHF, diabetes, hypertension, and high cholesterol. EMS noted audible rales that were improved with treatment of 2 sublingual nitroglycerin. EMS attempted BIPAP, however the patient did not tolerate this. Denies any chest pain, abdominal pain, or fevers.    REVIEW OF SYSTEMS  Pertinent positives include shortness of breath and hypoxia. Pertinent negatives include no chest pain, abdominal pain, or fevers.  All other systems reviewed and negative.    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    SURGICAL HISTORY  patient denies any surgical history    SOCIAL HISTORY  Social History     Tobacco Use   • Smoking status: Former Smoker     Packs/day: 1.00     Years: 20.00     Pack years: 20.00     Types: Cigarettes     Quit date: 1974     Years since quittin.7   • Smokeless tobacco: Never Used   Substance Use  Topics   • Alcohol use: Yes     Alcohol/week: 0.5 oz     Types: 1 drink(s) per week   • Drug use: No      Social History     Substance and Sexual Activity   Drug Use No       FAMILY HISTORY  Family History   Problem Relation Age of Onset   • Stroke Mother         brain anuerysm hemorrhage   • Heart Disease Father         MI   • Hypertension Father    • Cancer Brother         Prostate CA       CURRENT MEDICATIONS  Home Medications    **Home medications have not yet been reviewed for this encounter**         ALLERGIES  No Known Allergies    PHYSICAL EXAM  VITAL SIGNS: Pulse 90   Resp (!) 34   Ht 1.829 m (6')   Wt 90.7 kg (200 lb)   SpO2 88%   BMI 27.12 kg/m²     Constitutional: Well developed, Well nourished, severe distress, Non-toxic appearance.   HENT: Normocephalic, Atraumatic, TMs normal, mucous membranes moist, no erythema, exudates, swelling, or masses, nares patent  Eyes: nonicteric  Neck: Supple, no meningismus  Lymphatic: No lymphadenopathy noted.   Cardiovascular: Regular rate and rhythm, no gallops rubs or murmurs  Lungs: Severe respiratory distress, tachypnea, crackles bilaterally   Abdomen: Bowel sounds normal, Soft, No tenderness  Skin: Warm, Dry, no rash  Back: No tenderness, No CVA tenderness.   Genitalia: Deferred  Rectal: Deferred  Extremities: trace edema bilateral lower extremities   Neurologic: Alert, appropriate, follows commands, moving all extremities, normal speech   Psychiatric: Affect normal    DIAGNOSTIC STUDIES / PROCEDURES    LABS  Results for orders placed or performed during the hospital encounter of 07/27/21   CBC with Differential   Result Value Ref Range    WBC 12.5 (H) 4.8 - 10.8 K/uL    RBC 4.97 4.70 - 6.10 M/uL    Hemoglobin 14.9 14.0 - 18.0 g/dL    Hematocrit 46.6 42.0 - 52.0 %    MCV 93.8 81.4 - 97.8 fL    MCH 30.0 27.0 - 33.0 pg    MCHC 32.0 (L) 33.7 - 35.3 g/dL    RDW 52.8 (H) 35.9 - 50.0 fL    Platelet Count 318 164 - 446 K/uL    MPV 10.8 9.0 - 12.9 fL     Neutrophils-Polys 60.00 44.00 - 72.00 %    Lymphocytes 28.30 22.00 - 41.00 %    Monocytes 8.30 0.00 - 13.40 %    Eosinophils 1.40 0.00 - 6.90 %    Basophils 1.40 0.00 - 1.80 %    Immature Granulocytes 0.60 0.00 - 0.90 %    Nucleated RBC 0.00 /100 WBC    Neutrophils (Absolute) 7.49 (H) 1.82 - 7.42 K/uL    Lymphs (Absolute) 3.54 1.00 - 4.80 K/uL    Monos (Absolute) 1.04 (H) 0.00 - 0.85 K/uL    Eos (Absolute) 0.17 0.00 - 0.51 K/uL    Baso (Absolute) 0.18 (H) 0.00 - 0.12 K/uL    Immature Granulocytes (abs) 0.07 0.00 - 0.11 K/uL    NRBC (Absolute) 0.00 K/uL   Complete Metabolic Panel (CMP)   Result Value Ref Range    Sodium 139 135 - 145 mmol/L    Potassium 4.4 3.6 - 5.5 mmol/L    Chloride 105 96 - 112 mmol/L    Co2 17 (L) 20 - 33 mmol/L    Anion Gap 17.0 (H) 7.0 - 16.0    Glucose 262 (H) 65 - 99 mg/dL    Bun 16 8 - 22 mg/dL    Creatinine 1.02 0.50 - 1.40 mg/dL    Calcium 8.7 8.5 - 10.5 mg/dL    AST(SGOT) 17 12 - 45 U/L    ALT(SGPT) 16 2 - 50 U/L    Alkaline Phosphatase 133 (H) 30 - 99 U/L    Total Bilirubin 1.5 0.1 - 1.5 mg/dL    Albumin 4.3 3.2 - 4.9 g/dL    Total Protein 7.0 6.0 - 8.2 g/dL    Globulin 2.7 1.9 - 3.5 g/dL    A-G Ratio 1.6 g/dL   Troponin   Result Value Ref Range    Troponin T 14 6 - 19 ng/L   proBrain Natriuretic Peptide, NT   Result Value Ref Range    NT-proBNP 1049 (H) 0 - 125 pg/mL   ESTIMATED GFR   Result Value Ref Range    GFR If African American >60 >60 mL/min/1.73 m 2    GFR If Non African American >60 >60 mL/min/1.73 m 2   EKG   Result Value Ref Range    Report       [facility] Emergency Dept.    Test Date:  2021  Pt Name:    JEIMY NAYLOR                   Department: ER  MRN:        [id number]                      Room:        01  Gender:     Male                         Technician: [id number]  :        1940                   Requested By:ER TRIAGE PROTOCOL  Order #:    [id number]                    Reading MD:    Measurements  Intervals                                 Axis  Rate:       102                          P:  SD:                                      QRS:        64  QRSD:       142                          T:          209  QT:         368  QTc:        480    Interpretive Statements  ATRIAL FIBRILLATION, V-RATE    IVCD, CONSIDER ATYPICAL LBBB  Compared to ECG 10/22/2012 05:06:57  Myocardial infarct finding no longer present  Left-axis deviation no longer present  Sinus rhythm no longer present     POCT glucose device results   Result Value Ref Range    Glucose - Accu-Ck 233 (H) 65 - 99 mg/dL   POCT arterial blood gas device results   Result Value Ref Range    Ph 7.313 (L) 7.400 - 7.500    Pco2 38.0 (H) 26.0 - 37.0 mmHg    Po2 228 (H) 64 - 87 mmHg    Tco2 20 20 - 33 mmol/L    S02 100 (H) 93 - 99 %    Hco3 19.2 17.0 - 25.0 mmol/L    BE -6 (L) -4 - 3 mmol/L    Body Temp see below degrees    O2 Therapy 100 %    iPF Ratio 228     Specimen Arterial     DelSys NIV       All labs reviewed by me.    EKG  Obtained at 5:30 PM  Atrial Fibrillation  Rate 102  Axis normal   Intervals normal   Left bundle branch block  Trace increase in ST elevation anterior, however this was there previously   ST depression laterally   QT interval normal     RADIOLOGY  DX-CHEST-PORTABLE (1 VIEW)   Final Result      1.  Radiographic findings are most consistent with interstitial pulmonary edema.        The radiologist's interpretation of all radiological studies have been reviewed by me.    COURSE & MEDICAL DECISION MAKING  Nursing notes, Cecilio VUONG reviewed in chart.     5:54 PM Patient seen and examined at bedside.  I discussed that we will obtain labs and imaging to further evaluate for a cause of his symptoms. Respiratory at bedside. Patient is hypertensive at 196/106. Patient was started on BiPAP with improvements in his O2 saturation. Ordered for chest x-ray, COVID/SARS, ABG, serum acetone quant, pro-BNP, lactic acid, blood cultures, CBC w/ diff, CMP, troponin, and EKG  to evaluate. Patient  was treated with Rocephin 2 g, nitroglycerin 50 mg, and azithromycin 500 mg for his symptoms.     6:11 PM Spoke to radiology regarding the patients chest x-ray who states it is consistent with pulmonary edema.    6:34 PM Patient was reevaluated at bedside. He is hypotensive at 88/57. Paged ICU for admission.    6:56 PM I discussed the patient's case and the above findings with Dr. Artis (Intensivist) who agreed to evaluate patient for hospitalization. Patients care will be transferred at this time.     Decision Making:  This is a 81 y.o. year old male who presents with acute respiratory distress and crackles in his lung fields.  The patient has a history of CHF-we do not have an echocardiogram here on record but patient is normally followed at the [facility].  Patient responded well to BiPAP initially.  Blood pressure was very high on arrival but has dropped while on BiPAP and nitro.  The nitro was stopped and the patient's current blood pressure is 88 systolic.  Chest x-ray looks consistent with pulmonary edema favored over pneumonia.  The patient was treated with antibiotics for pneumonia.  Covid was sent.  Initially Lasix was held and given the low blood pressure at this point we have not treated with Lasix although the patient may need some going forward-this may have been flash pulmonary edema.  The patient does not have any peripheral signs of edema.    DISPOSITION:  Patient will be hospitalized by Dr. Artis in critical condition.    FINAL IMPRESSION  1. Dyspnea, unspecified type    2. Acute pulmonary edema (HCC)    3. Acute respiratory failure with hypoxia (HCC)          Nicole COLLINS (Cayden), am scribing for, and in the presence of, Daniel Wyatt M.D..    Electronically signed by: Nicole Em (Cayden), 7/27/2021    Daniel COLLINS M.D. personally performed the services described in this documentation, as scribed by Nicole Em in my presence, and it is both accurate and complete. C    The note  accurately reflects work and decisions made by me.  Daniel Wyatt M.D.  7/27/2021  7:00 PM

## 2021-07-29 ENCOUNTER — APPOINTMENT (OUTPATIENT)
Dept: RADIOLOGY | Facility: MEDICAL CENTER | Age: 81
DRG: 189 | End: 2021-07-29
Attending: STUDENT IN AN ORGANIZED HEALTH CARE EDUCATION/TRAINING PROGRAM
Payer: COMMERCIAL

## 2021-07-29 PROBLEM — G47.33 OSA (OBSTRUCTIVE SLEEP APNEA): Status: ACTIVE | Noted: 2021-07-29

## 2021-07-29 PROBLEM — N18.30 CKD (CHRONIC KIDNEY DISEASE), STAGE III: Status: ACTIVE | Noted: 2021-07-29

## 2021-07-29 PROBLEM — I25.10 CORONARY ARTERY DISEASE INVOLVING NATIVE CORONARY ARTERY OF NATIVE HEART: Status: ACTIVE | Noted: 2021-07-29

## 2021-07-29 PROBLEM — N40.0 BPH (BENIGN PROSTATIC HYPERPLASIA): Status: ACTIVE | Noted: 2021-07-29

## 2021-07-29 PROBLEM — D72.829 LEUKOCYTOSIS: Status: ACTIVE | Noted: 2021-07-29

## 2021-07-29 LAB
ANION GAP SERPL CALC-SCNC: 12 MMOL/L (ref 7–16)
BUN SERPL-MCNC: 28 MG/DL (ref 8–22)
CALCIUM SERPL-MCNC: 8.8 MG/DL (ref 8.5–10.5)
CHLORIDE SERPL-SCNC: 100 MMOL/L (ref 96–112)
CO2 SERPL-SCNC: 22 MMOL/L (ref 20–33)
CREAT SERPL-MCNC: 1.04 MG/DL (ref 0.5–1.4)
ERYTHROCYTE [DISTWIDTH] IN BLOOD BY AUTOMATED COUNT: 51.3 FL (ref 35.9–50)
GLUCOSE BLD-MCNC: 154 MG/DL (ref 65–99)
GLUCOSE BLD-MCNC: 201 MG/DL (ref 65–99)
GLUCOSE BLD-MCNC: 260 MG/DL (ref 65–99)
GLUCOSE BLD-MCNC: 296 MG/DL (ref 65–99)
GLUCOSE SERPL-MCNC: 161 MG/DL (ref 65–99)
HCT VFR BLD AUTO: 40.4 % (ref 42–52)
HGB BLD-MCNC: 13.6 G/DL (ref 14–18)
MAGNESIUM SERPL-MCNC: 2.2 MG/DL (ref 1.5–2.5)
MCH RBC QN AUTO: 30.2 PG (ref 27–33)
MCHC RBC AUTO-ENTMCNC: 33.7 G/DL (ref 33.7–35.3)
MCV RBC AUTO: 89.8 FL (ref 81.4–97.8)
PLATELET # BLD AUTO: 230 K/UL (ref 164–446)
PMV BLD AUTO: 12 FL (ref 9–12.9)
POTASSIUM SERPL-SCNC: 3.7 MMOL/L (ref 3.6–5.5)
RBC # BLD AUTO: 4.5 M/UL (ref 4.7–6.1)
SODIUM SERPL-SCNC: 134 MMOL/L (ref 135–145)
WBC # BLD AUTO: 19.2 K/UL (ref 4.8–10.8)

## 2021-07-29 PROCEDURE — 82962 GLUCOSE BLOOD TEST: CPT | Mod: 91

## 2021-07-29 PROCEDURE — A9270 NON-COVERED ITEM OR SERVICE: HCPCS | Performed by: INTERNAL MEDICINE

## 2021-07-29 PROCEDURE — 99233 SBSQ HOSP IP/OBS HIGH 50: CPT | Performed by: INTERNAL MEDICINE

## 2021-07-29 PROCEDURE — 94640 AIRWAY INHALATION TREATMENT: CPT

## 2021-07-29 PROCEDURE — 700111 HCHG RX REV CODE 636 W/ 250 OVERRIDE (IP): Performed by: INTERNAL MEDICINE

## 2021-07-29 PROCEDURE — 700102 HCHG RX REV CODE 250 W/ 637 OVERRIDE(OP): Performed by: INTERNAL MEDICINE

## 2021-07-29 PROCEDURE — 85027 COMPLETE CBC AUTOMATED: CPT

## 2021-07-29 PROCEDURE — 83735 ASSAY OF MAGNESIUM: CPT

## 2021-07-29 PROCEDURE — 99223 1ST HOSP IP/OBS HIGH 75: CPT | Performed by: STUDENT IN AN ORGANIZED HEALTH CARE EDUCATION/TRAINING PROGRAM

## 2021-07-29 PROCEDURE — 94760 N-INVAS EAR/PLS OXIMETRY 1: CPT

## 2021-07-29 PROCEDURE — 94669 MECHANICAL CHEST WALL OSCILL: CPT

## 2021-07-29 PROCEDURE — 700101 HCHG RX REV CODE 250: Performed by: INTERNAL MEDICINE

## 2021-07-29 PROCEDURE — 71250 CT THORAX DX C-: CPT

## 2021-07-29 PROCEDURE — 80048 BASIC METABOLIC PNL TOTAL CA: CPT

## 2021-07-29 PROCEDURE — 770020 HCHG ROOM/CARE - TELE (206)

## 2021-07-29 RX ORDER — PREDNISONE 20 MG/1
20 TABLET ORAL DAILY
Status: DISCONTINUED | OUTPATIENT
Start: 2021-08-01 | End: 2021-07-31 | Stop reason: HOSPADM

## 2021-07-29 RX ORDER — PREDNISONE 10 MG/1
10 TABLET ORAL DAILY
Status: DISCONTINUED | OUTPATIENT
Start: 2021-08-02 | End: 2021-07-31 | Stop reason: HOSPADM

## 2021-07-29 RX ORDER — PREDNISONE 20 MG/1
40 TABLET ORAL DAILY
Status: COMPLETED | OUTPATIENT
Start: 2021-07-30 | End: 2021-07-30

## 2021-07-29 RX ORDER — PREDNISONE 20 MG/1
40 TABLET ORAL DAILY
Status: DISCONTINUED | OUTPATIENT
Start: 2021-07-30 | End: 2021-07-29

## 2021-07-29 RX ORDER — FUROSEMIDE 40 MG/1
40 TABLET ORAL
Status: DISCONTINUED | OUTPATIENT
Start: 2021-07-30 | End: 2021-07-31 | Stop reason: HOSPADM

## 2021-07-29 RX ADMIN — FUROSEMIDE 40 MG: 10 INJECTION, SOLUTION INTRAMUSCULAR; INTRAVENOUS at 06:00

## 2021-07-29 RX ADMIN — AZITHROMYCIN MONOHYDRATE 500 MG: 250 TABLET ORAL at 06:00

## 2021-07-29 RX ADMIN — IPRATROPIUM BROMIDE AND ALBUTEROL SULFATE 3 ML: .5; 2.5 SOLUTION RESPIRATORY (INHALATION) at 07:48

## 2021-07-29 RX ADMIN — METHYLPREDNISOLONE SODIUM SUCCINATE 62.5 MG: 125 INJECTION, POWDER, FOR SOLUTION INTRAMUSCULAR; INTRAVENOUS at 05:59

## 2021-07-29 RX ADMIN — CARVEDILOL 12.5 MG: 12.5 TABLET, FILM COATED ORAL at 17:15

## 2021-07-29 RX ADMIN — INSULIN LISPRO 2 UNITS: 100 INJECTION, SOLUTION INTRAVENOUS; SUBCUTANEOUS at 05:57

## 2021-07-29 RX ADMIN — BENZOCAINE AND MENTHOL 1 LOZENGE: 15; 3.6 LOZENGE ORAL at 22:10

## 2021-07-29 RX ADMIN — FINASTERIDE 5 MG: 5 TABLET, FILM COATED ORAL at 06:00

## 2021-07-29 RX ADMIN — APIXABAN 5 MG: 5 TABLET, FILM COATED ORAL at 17:15

## 2021-07-29 RX ADMIN — CARVEDILOL 12.5 MG: 12.5 TABLET, FILM COATED ORAL at 06:00

## 2021-07-29 RX ADMIN — IPRATROPIUM BROMIDE AND ALBUTEROL SULFATE 3 ML: .5; 2.5 SOLUTION RESPIRATORY (INHALATION) at 03:19

## 2021-07-29 RX ADMIN — ISOSORBIDE MONONITRATE 60 MG: 30 TABLET, EXTENDED RELEASE ORAL at 06:00

## 2021-07-29 RX ADMIN — INSULIN GLARGINE 18 UNITS: 100 INJECTION, SOLUTION SUBCUTANEOUS at 17:18

## 2021-07-29 RX ADMIN — TAMSULOSIN HYDROCHLORIDE 0.4 MG: 0.4 CAPSULE ORAL at 06:00

## 2021-07-29 RX ADMIN — INSULIN LISPRO 5 UNITS: 100 INJECTION, SOLUTION INTRAVENOUS; SUBCUTANEOUS at 17:18

## 2021-07-29 RX ADMIN — BENZOCAINE AND MENTHOL 1 LOZENGE: 15; 3.6 LOZENGE ORAL at 10:46

## 2021-07-29 RX ADMIN — AMLODIPINE BESYLATE 10 MG: 10 TABLET ORAL at 06:00

## 2021-07-29 RX ADMIN — ATORVASTATIN CALCIUM 40 MG: 40 TABLET, FILM COATED ORAL at 21:16

## 2021-07-29 RX ADMIN — INSULIN LISPRO 3 UNITS: 100 INJECTION, SOLUTION INTRAVENOUS; SUBCUTANEOUS at 00:31

## 2021-07-29 RX ADMIN — APIXABAN 5 MG: 5 TABLET, FILM COATED ORAL at 06:00

## 2021-07-29 RX ADMIN — OMEPRAZOLE 20 MG: 20 CAPSULE, DELAYED RELEASE ORAL at 06:01

## 2021-07-29 RX ADMIN — INSULIN LISPRO 5 UNITS: 100 INJECTION, SOLUTION INTRAVENOUS; SUBCUTANEOUS at 11:34

## 2021-07-29 ASSESSMENT — ENCOUNTER SYMPTOMS
NERVOUS/ANXIOUS: 0
FEVER: 0
MYALGIAS: 0
SHORTNESS OF BREATH: 1
BLURRED VISION: 0
DIZZINESS: 0
PALPITATIONS: 0
ORTHOPNEA: 1
COUGH: 0
HEADACHES: 0
BACK PAIN: 0
COUGH: 1
DEPRESSION: 0
NAUSEA: 0
DOUBLE VISION: 0
SORE THROAT: 0
SPUTUM PRODUCTION: 1
WHEEZING: 0
VOMITING: 0
NECK PAIN: 0
HEARTBURN: 0
FOCAL WEAKNESS: 0
EYE DISCHARGE: 0
BRUISES/BLEEDS EASILY: 0
DIARRHEA: 0
CHILLS: 0
ABDOMINAL PAIN: 0
EYE PAIN: 0

## 2021-07-29 ASSESSMENT — CHA2DS2 SCORE
SEX: MALE
AGE 75 OR GREATER: YES
CHF OR LEFT VENTRICULAR DYSFUNCTION: NO
CHA2DS2 VASC SCORE: 6
VASCULAR DISEASE: NO
HYPERTENSION: YES
DIABETES: YES
AGE 65 TO 74: NO
PRIOR STROKE OR TIA OR THROMBOEMBOLISM: YES

## 2021-07-29 ASSESSMENT — PAIN DESCRIPTION - PAIN TYPE
TYPE: ACUTE PAIN
TYPE: ACUTE PAIN

## 2021-07-29 NOTE — CONSULTS
Hospital Medicine Consultation    Date of Service  7/29/2021    Referring Physician  Sushma Andrade M.D.    Consulting Physician  Nikko Becker M.D.    Reason for Consultation  Continuation of care/ transferring out of ICU    History of Presenting Illness  Per HPI and ICU interval updates:    Mr. Cook is a 81 year old male with the PMH significant for A-fib on Apixiban, DVT/PE, HTN, DM, CKDIII, chronic hypoxic respiratory failure on 3 lpm NC O2, CAD s/p stent JULIA and dyslipidemia who presented to the ER on 7/27/2021 with complaints of worsening shortness of breath over the past few days that worsened on the day of admission.  EMS was contacted and upon arrival found that the patient's O2 saturation was 70%.  The patient did not tolerate BiPAP, but improved with HFNC.  He was admitted to the ICU for further care and management.  7/27 - BiPAP briefly-->HFNC at 30/100%-->60% overnight, steroids  7/28 - HFNC 30/60%, started lasix, azithromycin, Duonebs  7/29 - HFNC at 30/50% then transitioned to 10L oxymask    Pt was deemed clinically stable to transfer to Hospitalist services.     Discussed care with ICU Dr. Andrade -  possible underlying lung disease other than obstruction (only a brief hx of smoking; quit 46 years ago), advised CT chest w/o to access. Pt should follow up [facility] ([facility]).     Met with Pt at bedside, comfortable but concerns about his breathing status though recovering. He denied Dx of COPD.      Labs and imagings done during admission reviewed    Discussed with patient, patient's nurse and with multidisciplinary team during rounds including , pharmacist and charge nurse.         Review of Systems  Review of Systems   Constitutional: Negative for chills, fever and malaise/fatigue.   HENT: Negative for congestion and sore throat.    Eyes: Negative for blurred vision and double vision.   Respiratory: Positive for cough, sputum production and shortness of breath.     Cardiovascular: Negative for chest pain, palpitations and leg swelling.   Gastrointestinal: Negative for abdominal pain, heartburn, nausea and vomiting.   Genitourinary: Negative for dysuria, frequency and urgency.   Musculoskeletal: Negative for myalgias and neck pain.   Neurological: Negative for dizziness, focal weakness and headaches.   Psychiatric/Behavioral: Negative for depression.       Past Medical History   has a past medical history of DVT (deep venous thrombosis), Essential hypertension, malignant, Mixed hyperlipidemia, Nephritis and nephropathy, not specified as acute or chronic, with other specified pathological lesion in kidney, in diseases classified elsewhere, PE (pulmonary embolism), Type II or unspecified type diabetes mellitus with renal manifestations, not stated as uncontrolled, Type II or unspecified type diabetes mellitus without mention of complication, not stated as uncontrolled, Unspecified asthma, and Unspecified vitamin D deficiency (4/26/2012).    Surgical History  Denies surgical history.     Family History  family history includes Cancer in his brother; Heart Disease in his father; Hypertension in his father; Stroke in his mother.    Social History   reports that he quit smoking about 46 years ago. His smoking use included cigarettes. He has a 20.00 pack-year smoking history. He has never used smokeless tobacco. He reports current alcohol use of about 0.5 oz of alcohol per week. He reports that he does not use drugs.    Medications  Prior to Admission Medications   Prescriptions Last Dose Informant Patient Reported? Taking?   Empagliflozin 25 MG Tab 7/27/2021 at 0930 Patient Yes Yes   Sig: Take 25 mg by mouth every day.   amlodipine (NORVASC) 10 MG TABS 7/27/2021 at 0930 Patient No No   Sig: TAKE 1 TABLET EVERY EVENING   apixaban (ELIQUIS) 5mg Tab 7/27/2021 at 0930 Patient Yes Yes   Sig: Take 5 mg by mouth 2 times a day.   atorvastatin (LIPITOR) 40 MG Tab 7/26/2021 at 2230 Patient  Yes Yes   Sig: Take 40 mg by mouth every evening.   carvedilol (COREG) 12.5 MG Tab 7/27/2021 at 0930 Patient Yes Yes   Sig: Take 12.5 mg by mouth 2 times a day.   finasteride (PROSCAR) 5 MG Tab 7/27/2021 at 0930 Patient Yes Yes   Sig: Take 5 mg by mouth every day.   glipiZIDE (GLUCOTROL) 5 MG Tab 7/26/2021 at 1730 Patient Yes Yes   Sig: Take 5 mg by mouth before evening meal.   isosorbide mononitrate SR (IMDUR) 60 MG TB24 7/27/2021 at 0930 Patient No No   Sig: Take 1 Tab by mouth every morning. Indications: Chronic Angina Pectoris   omeprazole (PRILOSEC) 20 MG delayed-release capsule 7/27/2021 at 0930 Patient Yes Yes   Sig: Take 20 mg by mouth every day.   tamsulosin (FLOMAX) 0.4 MG capsule 7/27/2021 at 0930 Patient Yes Yes   Sig: Take 0.4 mg by mouth every day.      Facility-Administered Medications: None       Allergies  No Known Allergies    Physical Exam  Temp:  [35.9 °C (96.6 °F)-36.3 °C (97.3 °F)] 36 °C (96.8 °F)  Pulse:  [61-94] 83  Resp:  [13-31] 18  BP: ()/(62-86) 121/74  SpO2:  [90 %-97 %] 93 %    Physical Exam  Vitals and nursing note reviewed.   Constitutional:       General: He is not in acute distress.     Appearance: Normal appearance. He is not ill-appearing.   HENT:      Head: Normocephalic and atraumatic.      Mouth/Throat:      Mouth: Mucous membranes are moist.      Pharynx: Oropharynx is clear.   Eyes:      General: No scleral icterus.     Conjunctiva/sclera: Conjunctivae normal.   Cardiovascular:      Rate and Rhythm: Normal rate and regular rhythm.      Pulses: Normal pulses.      Heart sounds: Normal heart sounds.   Pulmonary:      Effort: Pulmonary effort is normal. No respiratory distress.      Breath sounds: Normal breath sounds. No wheezing.      Comments: Good air entry  Abdominal:      General: Bowel sounds are normal. There is no distension.      Palpations: Abdomen is soft.      Tenderness: There is no abdominal tenderness.   Musculoskeletal:         General: No swelling or  tenderness. Normal range of motion.   Skin:     General: Skin is warm and dry.      Capillary Refill: Capillary refill takes less than 2 seconds.   Neurological:      General: No focal deficit present.      Mental Status: He is alert and oriented to person, place, and time. Mental status is at baseline.   Psychiatric:         Mood and Affect: Mood normal.         Fluids  Date 07/29/21 0700 - 07/30/21 0659   Shift 0788-0032 [id number] 24 Hour Total   INTAKE   Shift Total       OUTPUT   Urine 800   800   Shift Total 800   800   Weight (kg) 90.2 90.2 90.2 90.2       Laboratory  Recent Labs     07/27/21  1750 07/28/21  0520 07/29/21  0600   WBC 12.5* 6.7 19.2*   RBC 4.97 4.70 4.50*   HEMOGLOBIN 14.9 14.1 13.6*   HEMATOCRIT 46.6 42.5 40.4*   MCV 93.8 90.4 89.8   MCH 30.0 30.0 30.2   MCHC 32.0* 33.2* 33.7   RDW 52.8* 50.1* 51.3*   PLATELETCT 318 184 230   MPV 10.8 10.6 12.0     Recent Labs     07/27/21  1750 07/28/21  0520 07/29/21  0900   SODIUM 139 139 134*   POTASSIUM 4.4 3.9 3.7   CHLORIDE 105 103 100   CO2 17* 18* 22   GLUCOSE 262* 161* 161*   BUN 16 20 28*   CREATININE 1.02 1.05 1.04   CALCIUM 8.7 9.0 8.8                     Imaging  EC-ECHOCARDIOGRAM COMPLETE W/O CONT   Final Result   Normal left ventricular systolic function.  Left ventricular ejection fraction is visually estimated to be 55%.  Hypokinesis of the distal septum.  Asymmetric septal hypertrophy.  Normal right ventricular size and systolic function.   DX-CHEST-PORTABLE (1 VIEW)   Final Result      1.  Radiographic findings are most consistent with interstitial pulmonary edema.      CT-CHEST (THORAX) W/O    (Results Pending)       Assessment/Plan  * Acute on chronic respiratory failure with hypoxia (HCC)- (present on admission)  Assessment & Plan  Initially thought CHF exacerbation vs COPD exacerbation vs possible other underlying lung diseases  No recent records, [facility]  Cont HFNC with improved O2 and continue to  wean  Cont diuresis with Lasix 40mg IV daily (switch to po)  Echo - normal LVEF and normal RV function; asymmetric septal hypertrophy; Moderate AR    Will get CT chest to view lung parenchyma-->?emphysema, Hx of PE, ILD  Switch steroids to oral prednisone 40mg with a fast taper  RT protocols    Would suggest that pulmonary follow patient inpatient and see as an outpatient-->should get PFTs once discharged    Coronary artery disease involving native coronary artery of native heart- (present on admission)  Assessment & Plan  Reports history of Stent many years ago  Not [facility] or [facility]  C/w Statin, beta blocker  Not on ACEI or ARB at home     Leukocytosis  Assessment & Plan  Likely from steroid     JULIA (obstructive sleep apnea)- (present on admission)  Assessment & Plan  Reports this Dx but denies using CPAP  He states he was given O2 via NC at night for this    BPH (benign prostatic hyperplasia)- (present on admission)  Assessment & Plan  C/w proscar and flomax    CKD (chronic kidney disease), stage III (HCC)- (present on admission)  Assessment & Plan  Stable  Avoid nephrotoxins    Hx of pulmonary embolus- (present on admission)  Assessment & Plan  Hx of   Cont Apixiban    A-fib (HCC)- (present on admission)  Assessment & Plan  Rate controlled  Cont Apixiban    Mixed hyperlipidemia- (present on admission)  Assessment & Plan  Cont home statin    Type 2 diabetes mellitus (HCC)- (present on admission)  Assessment & Plan  BG goals 140-180s  Lantus 18 units nightly  ISS    Holding home jardiance and glipidize  Will check HbA1C    Essential hypertension- (present on admission)  Assessment & Plan  Cont home medications of amlodipine, carvedilol, imdur      DVT PPX: on therapeutic anti-coag with apixaban    I have performed the physical examination, and reviewed updated ROS and plan today 7/29/2021.

## 2021-07-29 NOTE — CARE PLAN
Problem: Respiratory  Goal: Patient will achieve/maintain optimum respiratory ventilation and gas exchange  Outcome: Not Progressing     Problem: Knowledge Deficit - Standard  Goal: Patient and family/care givers will demonstrate understanding of plan of care, disease process/condition, diagnostic tests and medications  Outcome: Progressing     Problem: Fall Risk  Goal: Patient will remain free from falls  Outcome: Progressing

## 2021-07-29 NOTE — CARE PLAN
Problem: Bronchoconstriction  Goal: Improve in air movement and diminished wheezing  Description: 1.  Implement inhaled treatments  2.  Evaluate and manage medication effects  Outcome: Progressing       Respiratory Update    Treatment modality: HHFNC 30K 60% Bipap PRN, Duo Q4  Frequency:    Pt tolerating current treatments well with no adverse reactions.

## 2021-07-29 NOTE — ASSESSMENT & PLAN NOTE
Initially thought CHF exacerbation vs COPD exacerbation vs possible other underlying lung diseases  No recent records, [facility]  Cont HFNC with improved O2 and continue to wean  Cont diuresis with Lasix 40mg IV daily (switch to po)  Echo - normal LVEF and normal RV function; asymmetric septal hypertrophy; Moderate AR    CT chest with findings of right-sided pleural effusions.  Orders placed for thoracentesis.

## 2021-07-29 NOTE — PROGRESS NOTES
4 Eyes Skin Assessment Completed by LARON Toribio and LARON Dotson.    Head WDL  Ears WDL  Nose WDL  Mouth WDL  Neck WDL  Breast/Chest WDL  Shoulder Blades WDL  Spine WDL  (R) Arm/Elbow/Hand Redness and Blanching  (L) Arm/Elbow/Hand Redness and Blanching  Abdomen WDL  Groin WDL  Scrotum/Coccyx/Buttocks WDL  (R) Leg Rash/ dryness on inner leg    (L) Leg WDL  (R) Heel/Foot/Toe  Dryness and flaky  (L) Heel/Foot/Toe Blanching, dryness and flaky          Devices In Places tele box, oxy mask, patient turns self      Interventions In Place Pillows    Possible Skin Injury No    Pictures Uploaded Into Epic N/A  Wound Consult Placed N/A  RN Wound Prevention Protocol Ordered Yes

## 2021-07-29 NOTE — CARE PLAN
Problem: Knowledge Deficit - Standard  Goal: Patient and family/care givers will demonstrate understanding of plan of care, disease process/condition, diagnostic tests and medications  Outcome: Progressing     Problem: Respiratory  Goal: Patient will achieve/maintain optimum respiratory ventilation and gas exchange  Outcome: Progressing     Problem: Fall Risk  Goal: Patient will remain free from falls  Outcome: Progressing   The patient is Stable - Low risk of patient condition declining or worsening

## 2021-07-29 NOTE — PROGRESS NOTES
Critical Care Progress Note    Date of admission  7/27/2021    Chief Complaint  81 y.o. male admitted 7/27/2021 with acute on chronic hypoxic respiratory failure    Hospital Course  Mr. Cook is a 81 year old male with the past medical history significant for a-fib on Apixiban, DVT/PE, hypertension, CKD, chronic hypoxic respiratory failure on 3 lpm NC O2 and dyslipidemia who presented to the ER on 7/27/2021 with complaints of worsening shortness of breath over the past few days that worsened on the day of admission.  EMS was contacted and upon arrival found that the patient's O2 saturation was 70%.  The patient did not tolerate BiPAP, but improved with HFNC.  He was admitted to the ICU for further care and management.  7/27 - BiPAP briefly-->HFNC at 30/100%-->60% overnight, steroids  7/28 - HFNC 30/60%, started lasix, azithromycin, Duonebs  7/29 - HFNC at 30/50%    Interval Problem Update  Reviewed last 24 hour events:   - no events overnight   - a/ox4   - a-fib 60-80s   - -120s   - Tmax 97.3   - tolerating diabetic diet   - UOP of 2.7 liters yesterday with urinal   - Reviewed ECHO   - no new imaging today   - BM yesterday   - up to chair   - pt feeling about the same:  States difficulty getting full breath, no cough, some chest tightness.  Doesn't have great improvement with Duonebs   - HFNC at 30/50%   - IV steroids-->PO with quick taper   - WBCs 19.2   - K 3.7   - creat 1.04    Yesterday's Events:   - no issues overnight after transferring to ICU   - a/ox4   - a-fib 70-100s   - -140s   - afebrile   - DM diet   - UOP of 1 liters with urinal   - HFNC 30/60%   - CXR(reviewed): bibasilar opacities R>L   - apixiban   - azithromycin for 3 days   - solumedrol 60 mg BID   - K 3.9   - Mg 1.7       Review of Systems  Review of Systems   Constitutional: Positive for malaise/fatigue. Negative for chills and fever.   HENT: Negative for congestion and sore throat.    Eyes: Negative for pain and discharge.    Respiratory: Positive for shortness of breath. Negative for cough and wheezing.    Cardiovascular: Positive for orthopnea.   Gastrointestinal: Negative for abdominal pain, diarrhea, nausea and vomiting.   Genitourinary: Negative for frequency and hematuria.   Musculoskeletal: Negative for back pain and neck pain.   Skin: Negative for rash.   Neurological: Negative for dizziness, focal weakness and headaches.   Endo/Heme/Allergies: Does not bruise/bleed easily.   Psychiatric/Behavioral: Negative for depression. The patient is not nervous/anxious.         Vital Signs for last 24 hours   Temp:  [35.9 °C (96.6 °F)-36.3 °C (97.3 °F)] 36 °C (96.8 °F)  Pulse:  [61-96] 81  Resp:  [13-31] 17  BP: (107-140)/(65-86) 125/73  SpO2:  [92 %-97 %] 95 %    Hemodynamic parameters for last 24 hours       Respiratory Information for the last 24 hours       Physical Exam   Physical Exam  Vitals and nursing note reviewed.   Constitutional:       General: He is not in acute distress.     Appearance: He is ill-appearing. He is not toxic-appearing.      Comments: No distress and speaks in full sentences while on HFNC   HENT:      Head: Normocephalic and atraumatic.      Right Ear: External ear normal.      Left Ear: External ear normal.      Nose: Nose normal. No rhinorrhea.      Comments: HFNC in place     Mouth/Throat:      Mouth: Mucous membranes are moist.      Pharynx: Oropharynx is clear. No oropharyngeal exudate.   Eyes:      General: No scleral icterus.     Conjunctiva/sclera: Conjunctivae normal.      Pupils: Pupils are equal, round, and reactive to light.   Cardiovascular:      Rate and Rhythm: Normal rate and regular rhythm.      Pulses: Normal pulses.      Heart sounds: Normal heart sounds. No murmur heard.     Pulmonary:      Breath sounds: No wheezing or rales.      Comments: Tolerating HFNC and weaning, diminished bases R>L, no crackles or wheezing  Chest:      Chest wall: No tenderness.   Abdominal:      General: Bowel  sounds are normal. There is no distension.      Palpations: Abdomen is soft.      Tenderness: There is no abdominal tenderness. There is no guarding.   Musculoskeletal:         General: Normal range of motion.      Cervical back: Normal range of motion and neck supple.      Right lower leg: No edema.      Left lower leg: No edema.   Lymphadenopathy:      Cervical: No cervical adenopathy.   Skin:     General: Skin is warm and dry.      Capillary Refill: Capillary refill takes less than 2 seconds.      Findings: No rash.   Neurological:      Mental Status: He is alert and oriented to person, place, and time.      Cranial Nerves: No cranial nerve deficit.      Sensory: No sensory deficit.      Motor: No weakness.   Psychiatric:         Mood and Affect: Mood normal.         Behavior: Behavior normal.         Thought Content: Thought content normal.         Medications  Current Facility-Administered Medications   Medication Dose Route Frequency Provider Last Rate Last Admin   • furosemide (LASIX) injection 40 mg  40 mg Intravenous Q DAY Sushma Andrade M.D.   40 mg at 07/29/21 0600   • nitroglycerin 50 mg in D5W 250 ml infusion  0-200 mcg/min Intravenous Continuous Daniel Wyatt M.D.   Stopped at 07/27/21 1835   • methylPREDNISolone sod succ (SOLU-MEDROL) 125 MG injection 62.5 mg  62.5 mg Intravenous Q12HRS Grant Artis D.O.   62.5 mg at 07/29/21 0559   • azithromycin (ZITHROMAX) tablet 500 mg  500 mg Oral DAILY Grant Artis D.O.   500 mg at 07/29/21 0600   • amLODIPine (NORVASC) tablet 10 mg  10 mg Oral Q DAY Reggie Matson M.D.   10 mg at 07/29/21 0600   • apixaban (ELIQUIS) tablet 5 mg  5 mg Oral BID Reggie Matson M.D.   5 mg at 07/29/21 0600   • atorvastatin (LIPITOR) tablet 40 mg  40 mg Oral Nightly Reggie Matson M.D.   40 mg at 07/28/21 2135   • carvedilol (COREG) tablet 12.5 mg  12.5 mg Oral BID Reggie Matson M.D.   12.5 mg at 07/29/21 0600   • finasteride (PROSCAR) tablet 5 mg  5 mg Oral DAILY Reggie CABRERA  ROM Matson   5 mg at 07/29/21 0600   • isosorbide mononitrate SR (IMDUR) tablet 60 mg  60 mg Oral QAM Reggie Matson M.D.   60 mg at 07/29/21 0600   • omeprazole (PRILOSEC) capsule 20 mg  20 mg Oral DAILY Reggie Matson M.D.   20 mg at 07/29/21 0601   • tamsulosin (FLOMAX) capsule 0.4 mg  0.4 mg Oral DAILY Reggie Matson M.D.   0.4 mg at 07/29/21 0600   • ipratropium-albuterol (DUONEB) nebulizer solution  3 mL Nebulization Q4HRS (RT) GANGA Hicks.OAlfred   3 mL at 07/29/21 0319   • ipratropium-albuterol (DUONEB) nebulizer solution  3 mL Nebulization Q2HRS PRN (RT) Grant Artis D.O.       • insulin glargine (Semglee) injection  0.2 Units/kg/day Subcutaneous Q EVENING Reggie Matson M.D.   18 Units at 07/28/21 1749    And   • insulin lispro (AdmeLOG) injection  2-9 Units Subcutaneous Q6HRS Reggie Matson M.D.   2 Units at 07/29/21 0557    And   • glucose 4 g chewable tablet 16 g  16 g Oral Q15 MIN PRN Reggie Matson M.D.        And   • dextrose 50% (D50W) injection 50 mL  50 mL Intravenous Q15 MIN PRN Reggie Matson M.D.           Fluids    Intake/Output Summary (Last 24 hours) at 7/29/2021 0647  Last data filed at 7/29/2021 0400  Gross per 24 hour   Intake 50 ml   Output 2775 ml   Net -2725 ml       Laboratory  Recent Labs     07/27/21  1833   ISTATAPH 7.313*   ISTATAPCO2 38.0*   ISTATAPO2 228*   ISTATATCO2 20   MUHYIRF9EDQ 100*   ISTATARTHCO3 19.2   ISTATARTBE -6*   ISTATTEMP see below   ISTATFIO2 100   ISTATSPEC Arterial         Recent Labs     07/27/21  1750 07/28/21  0520   SODIUM 139 139   POTASSIUM 4.4 3.9   CHLORIDE 105 103   CO2 17* 18*   BUN 16 20   CREATININE 1.02 1.05   MAGNESIUM  --  1.7   CALCIUM 8.7 9.0     Recent Labs     07/27/21 1750 07/28/21  0520   ALTSGPT 16  --    ASTSGOT 17  --    ALKPHOSPHAT 133*  --    TBILIRUBIN 1.5  --    GLUCOSE 262* 161*     Recent Labs     07/27/21 1750 07/28/21  0520 07/29/21  0600   WBC 12.5* 6.7 19.2*   NEUTSPOLYS 60.00  --   --    LYMPHOCYTES 28.30  --   --     MONOCYTES 8.30  --   --    EOSINOPHILS 1.40  --   --    BASOPHILS 1.40  --   --    ASTSGOT 17  --   --    ALTSGPT 16  --   --    ALKPHOSPHAT 133*  --   --    TBILIRUBIN 1.5  --   --      Recent Labs     07/27/21  1750 07/28/21  0520 07/29/21  0600   RBC 4.97 4.70 4.50*   HEMOGLOBIN 14.9 14.1 13.6*   HEMATOCRIT 46.6 42.5 40.4*   PLATELETCT 318 184 230       Imaging  ECHO:  CONCLUSIONS  Normal left ventricular systolic function.  Left ventricular ejection fraction is visually estimated to be 55%.  Hypokinesis of the distal septum.  Asymmetric septal hypertrophy.  Normal right ventricular size and systolic function.  Aortic sclerosis without stenosis.  Moderate aortic insufficiency.  No prior study is available for comparison.     Assessment/Plan  Hx of pulmonary embolus- (present on admission)  Assessment & Plan  Hx of   Cont Apixiban    Acute on chronic respiratory failure with hypoxia (HCC)- (present on admission)  Assessment & Plan  ? CHF exacerbation vs COPD exacerbation  No recent records, [facility]  Cont HFNC with improved O2 and continue to wean  Cont diuresis with Lasix 40mg IV daily  Check ECHO  May need to consider CT chest to view lung parenchyma-->?emphysema, Hx of PE, ILD  Switch steroids to oral prednisone 40mg with a fast taper  RT protocols  ECHO with moderate aortic insufficiency  Would suggest that pulmonary follow patient inpatient and see as an outpatient-->should get PFTs once discharged      A-fib (HCC)- (present on admission)  Assessment & Plan  Rate controlled  Cont Apixiban    Mixed hyperlipidemia- (present on admission)  Assessment & Plan  Cont home statin    Type II or unspecified type diabetes mellitus with renal manifestations, not stated as uncontrolled(250.40)  Assessment & Plan  BG goals 140-180s  Lantus 18 units nightly  ISS    Essential hypertension, malignant- (present on admission)  Assessment & Plan  Cont home medications of amlodipine, carvedilol, imdur        VTE:  NOAC  Ulcer: Not Indicated  Lines: PIVs    I have performed a physical exam and reviewed and updated ROS and Plan today (7/29/2021). In review of yesterday's note (7/28/2021), there are no changes except as documented above.     Discussed patient condition and risk of morbidity and/or mortality with RN, RT, Pharmacy, , Charge nurse / hot rounds and Patient      Patient is weaning very rapidly on his high flow nasal cannula and appears very stable from a acute on chronic hypoxic respiratory failure standpoint.  I reviewed the patient's echocardiogram and aside from moderate aortic insufficiency his heart function appears to be relatively normal.  The patient would benefit from outpatient pulmonary follow-up as well as a CT chest without contrast and eventually pulmonary function test.  The case was discussed with Dr. Becker who will take over care at this time.  The critical care team will sign off at this point.

## 2021-07-29 NOTE — DISCHARGE PLANNING
Care Transition Team Discharge Planning    Anticipated Discharge Disposition: TBD     Action: Per chart review, pt no longer needs ICU level of care, and will transfer from [facility] to hospitalist then to a lower level of care unit.      Barriers to Discharge: no longer critical, but still acute level care needs    Plan: [facility] d/c team will continue to follow, and assist with d/c planning.

## 2021-07-29 NOTE — ASSESSMENT & PLAN NOTE
Reports history of Stent many years ago  [facility] or [facility]  C/w Statin, beta blocker  Not on ACEI or ARB at home

## 2021-07-30 ENCOUNTER — APPOINTMENT (OUTPATIENT)
Dept: RADIOLOGY | Facility: MEDICAL CENTER | Age: 81
DRG: 189 | End: 2021-07-30
Attending: RADIOLOGY
Payer: COMMERCIAL

## 2021-07-30 ENCOUNTER — APPOINTMENT (OUTPATIENT)
Dept: RADIOLOGY | Facility: MEDICAL CENTER | Age: 81
DRG: 189 | End: 2021-07-30
Attending: STUDENT IN AN ORGANIZED HEALTH CARE EDUCATION/TRAINING PROGRAM
Payer: COMMERCIAL

## 2021-07-30 LAB
AMYLASE FLD-CCNC: 6 U/L
ANION GAP SERPL CALC-SCNC: 14 MMOL/L (ref 7–16)
APPEARANCE FLD: NORMAL
BASOPHILS NFR FLD: 0 %
BODY FLD TYPE: NORMAL
BUN SERPL-MCNC: 40 MG/DL (ref 8–22)
CALCIUM SERPL-MCNC: 8.6 MG/DL (ref 8.5–10.5)
CHLORIDE SERPL-SCNC: 101 MMOL/L (ref 96–112)
CO2 SERPL-SCNC: 23 MMOL/L (ref 20–33)
COLOR FLD: YELLOW
CREAT SERPL-MCNC: 1.31 MG/DL (ref 0.5–1.4)
CYTOLOGY REG CYTOL: NORMAL
EOSINOPHIL NFR FLD: 1 %
ERYTHROCYTE [DISTWIDTH] IN BLOOD BY AUTOMATED COUNT: 52 FL (ref 35.9–50)
GLUCOSE BLD-MCNC: 135 MG/DL (ref 65–99)
GLUCOSE BLD-MCNC: 171 MG/DL (ref 65–99)
GLUCOSE BLD-MCNC: 250 MG/DL (ref 65–99)
GLUCOSE BLD-MCNC: 255 MG/DL (ref 65–99)
GLUCOSE FLD-MCNC: 212 MG/DL
GLUCOSE SERPL-MCNC: 124 MG/DL (ref 65–99)
HCT VFR BLD AUTO: 41.5 % (ref 42–52)
HGB BLD-MCNC: 13.7 G/DL (ref 14–18)
HISTIOCYTES NFR FLD: 0 %
LDH FLD L TO P-CCNC: 101 U/L
LYMPHOCYTES NFR FLD: 38 %
MAGNESIUM SERPL-MCNC: 2.2 MG/DL (ref 1.5–2.5)
MCH RBC QN AUTO: 30.3 PG (ref 27–33)
MCHC RBC AUTO-ENTMCNC: 33 G/DL (ref 33.7–35.3)
MCV RBC AUTO: 91.8 FL (ref 81.4–97.8)
MESOTHL CELL NFR FLD: 19 %
MONONUC CELLS NFR FLD: 8 %
NEUTROPHILS NFR FLD: 34 %
PH FLD: 8 [PH]
PLATELET # BLD AUTO: 293 K/UL (ref 164–446)
PMV BLD AUTO: 11.1 FL (ref 9–12.9)
POTASSIUM SERPL-SCNC: 3.6 MMOL/L (ref 3.6–5.5)
PROT FLD-MCNC: 2 G/DL
RBC # BLD AUTO: 4.52 M/UL (ref 4.7–6.1)
RBC # FLD: 7000 CELLS/UL
SODIUM SERPL-SCNC: 138 MMOL/L (ref 135–145)
WBC # BLD AUTO: 23.2 K/UL (ref 4.8–10.8)
WBC # FLD: 269 CELLS/UL
WBC OTHER NFR FLD: 0 %

## 2021-07-30 PROCEDURE — 82962 GLUCOSE BLOOD TEST: CPT | Mod: 91

## 2021-07-30 PROCEDURE — 88112 CYTOPATH CELL ENHANCE TECH: CPT

## 2021-07-30 PROCEDURE — 0W993ZZ DRAINAGE OF RIGHT PLEURAL CAVITY, PERCUTANEOUS APPROACH: ICD-10-PCS | Performed by: RADIOLOGY

## 2021-07-30 PROCEDURE — 94760 N-INVAS EAR/PLS OXIMETRY 1: CPT

## 2021-07-30 PROCEDURE — 82150 ASSAY OF AMYLASE: CPT

## 2021-07-30 PROCEDURE — 83615 LACTATE (LD) (LDH) ENZYME: CPT

## 2021-07-30 PROCEDURE — 770020 HCHG ROOM/CARE - TELE (206)

## 2021-07-30 PROCEDURE — 83986 ASSAY PH BODY FLUID NOS: CPT

## 2021-07-30 PROCEDURE — 700102 HCHG RX REV CODE 250 W/ 637 OVERRIDE(OP): Performed by: STUDENT IN AN ORGANIZED HEALTH CARE EDUCATION/TRAINING PROGRAM

## 2021-07-30 PROCEDURE — A9270 NON-COVERED ITEM OR SERVICE: HCPCS | Performed by: INTERNAL MEDICINE

## 2021-07-30 PROCEDURE — 700102 HCHG RX REV CODE 250 W/ 637 OVERRIDE(OP): Performed by: INTERNAL MEDICINE

## 2021-07-30 PROCEDURE — 82945 GLUCOSE OTHER FLUID: CPT

## 2021-07-30 PROCEDURE — 84157 ASSAY OF PROTEIN OTHER: CPT

## 2021-07-30 PROCEDURE — 85027 COMPLETE CBC AUTOMATED: CPT

## 2021-07-30 PROCEDURE — 87015 SPECIMEN INFECT AGNT CONCNTJ: CPT

## 2021-07-30 PROCEDURE — 89051 BODY FLUID CELL COUNT: CPT

## 2021-07-30 PROCEDURE — 94669 MECHANICAL CHEST WALL OSCILL: CPT

## 2021-07-30 PROCEDURE — 700111 HCHG RX REV CODE 636 W/ 250 OVERRIDE (IP): Performed by: INTERNAL MEDICINE

## 2021-07-30 PROCEDURE — 88305 TISSUE EXAM BY PATHOLOGIST: CPT

## 2021-07-30 PROCEDURE — 36415 COLL VENOUS BLD VENIPUNCTURE: CPT

## 2021-07-30 PROCEDURE — 87205 SMEAR GRAM STAIN: CPT

## 2021-07-30 PROCEDURE — C1729 CATH, DRAINAGE: HCPCS

## 2021-07-30 PROCEDURE — 99232 SBSQ HOSP IP/OBS MODERATE 35: CPT | Performed by: STUDENT IN AN ORGANIZED HEALTH CARE EDUCATION/TRAINING PROGRAM

## 2021-07-30 PROCEDURE — A9270 NON-COVERED ITEM OR SERVICE: HCPCS | Performed by: STUDENT IN AN ORGANIZED HEALTH CARE EDUCATION/TRAINING PROGRAM

## 2021-07-30 PROCEDURE — 87070 CULTURE OTHR SPECIMN AEROBIC: CPT

## 2021-07-30 PROCEDURE — 80048 BASIC METABOLIC PNL TOTAL CA: CPT

## 2021-07-30 PROCEDURE — 71045 X-RAY EXAM CHEST 1 VIEW: CPT

## 2021-07-30 PROCEDURE — 83735 ASSAY OF MAGNESIUM: CPT

## 2021-07-30 RX ADMIN — INSULIN GLARGINE 18 UNITS: 100 INJECTION, SOLUTION SUBCUTANEOUS at 16:51

## 2021-07-30 RX ADMIN — AMLODIPINE BESYLATE 10 MG: 10 TABLET ORAL at 05:54

## 2021-07-30 RX ADMIN — PREDNISONE 40 MG: 20 TABLET ORAL at 05:53

## 2021-07-30 RX ADMIN — ISOSORBIDE MONONITRATE 60 MG: 30 TABLET, EXTENDED RELEASE ORAL at 05:53

## 2021-07-30 RX ADMIN — CARVEDILOL 12.5 MG: 12.5 TABLET, FILM COATED ORAL at 05:53

## 2021-07-30 RX ADMIN — INSULIN LISPRO 2 UNITS: 100 INJECTION, SOLUTION INTRAVENOUS; SUBCUTANEOUS at 00:21

## 2021-07-30 RX ADMIN — APIXABAN 5 MG: 5 TABLET, FILM COATED ORAL at 05:53

## 2021-07-30 RX ADMIN — INSULIN LISPRO 5 UNITS: 100 INJECTION, SOLUTION INTRAVENOUS; SUBCUTANEOUS at 11:30

## 2021-07-30 RX ADMIN — INSULIN LISPRO 2 UNITS: 100 INJECTION, SOLUTION INTRAVENOUS; SUBCUTANEOUS at 23:37

## 2021-07-30 RX ADMIN — FUROSEMIDE 40 MG: 40 TABLET ORAL at 05:53

## 2021-07-30 RX ADMIN — CARVEDILOL 12.5 MG: 12.5 TABLET, FILM COATED ORAL at 16:50

## 2021-07-30 RX ADMIN — OMEPRAZOLE 20 MG: 20 CAPSULE, DELAYED RELEASE ORAL at 05:53

## 2021-07-30 RX ADMIN — INSULIN LISPRO 3 UNITS: 100 INJECTION, SOLUTION INTRAVENOUS; SUBCUTANEOUS at 16:51

## 2021-07-30 RX ADMIN — TAMSULOSIN HYDROCHLORIDE 0.4 MG: 0.4 CAPSULE ORAL at 05:53

## 2021-07-30 RX ADMIN — ATORVASTATIN CALCIUM 40 MG: 40 TABLET, FILM COATED ORAL at 20:00

## 2021-07-30 RX ADMIN — FINASTERIDE 5 MG: 5 TABLET, FILM COATED ORAL at 05:54

## 2021-07-30 ASSESSMENT — COGNITIVE AND FUNCTIONAL STATUS - GENERAL
SUGGESTED CMS G CODE MODIFIER DAILY ACTIVITY: CH
SUGGESTED CMS G CODE MODIFIER MOBILITY: CI
DAILY ACTIVITIY SCORE: 24
CLIMB 3 TO 5 STEPS WITH RAILING: A LITTLE
MOBILITY SCORE: 23

## 2021-07-30 ASSESSMENT — ENCOUNTER SYMPTOMS
CONSTITUTIONAL NEGATIVE: 1
SHORTNESS OF BREATH: 1
GASTROINTESTINAL NEGATIVE: 1
CARDIOVASCULAR NEGATIVE: 1

## 2021-07-30 ASSESSMENT — PAIN DESCRIPTION - PAIN TYPE: TYPE: ACUTE PAIN

## 2021-07-30 ASSESSMENT — FIBROSIS 4 INDEX: FIB4 SCORE: 1.15

## 2021-07-30 NOTE — PROGRESS NOTES
Dr. Sanchez consented patient and performed  Thoracentesis- Right in U/S room 2  .  Vitals monitored during procedure and documented in EPIC.   1350 ml of fluid removed,   1050ml of fluid sent to lab .  Patient tolerated procedure well.. Report given to LARON Toribio. Post Thora x-ray preformed in procedure room. Transport returned pt to her room

## 2021-07-30 NOTE — PROGRESS NOTES
Assumed care of patient. Bedside report, received from Juan Alberto LARRY. Updated POC, call light within reach, and fall precautions in place. Bed locked and and in lowest position. Patient instructed to call for assistance before getting out of bed. All questions answered, no further needs at this time.

## 2021-07-30 NOTE — CARE PLAN
Problem: Respiratory  Goal: Patient will achieve/maintain optimum respiratory ventilation and gas exchange  Outcome: Progressing     Problem: Knowledge Deficit - COPD  Goal: Patient/significant other demonstrates understanding of disease process, utilization of the Action Plan, medications and discharge instruction  Outcome: Progressing   The patient is Watcher - Medium risk of patient condition declining or worsening    Shift Goals  Clinical Goals: Respt function  Patient Goals: Rest    Progress made toward(s) clinical / shift goals:  Patient weaned to 4L, Patient resting comfortably in bed    Patient is not progressing towards the following goals:

## 2021-07-30 NOTE — PROGRESS NOTES
[facility] Medicine Daily Progress Note    Date of Service  7/30/2021    Chief Complaint  Oleg Cook is a 81 y.o. male admitted 7/27/2021 with acute on chronic hypoxic failure    Interval Problem Update  Patient seen and examined at bedside this morning.  No acute events overnight.     Patient downgraded from the ICU yesterday. Admitted with acute on chronic hypoxic respiratory failure.  Unknown etiology?? could be secondary to COPD/CHF. Remains on diuretics and steroids.  CT chest with moderate right-sided pleural effusions. Orders placed for thoracentesis.    I have personally seen and examined the patient at bedside. I discussed the plan of care with patient and bedside RN.    Consultants/Specialty  none    Code Status  Prior    Disposition  Patient is not medically cleared.   Anticipate discharge to to home with close outpatient follow-up.  I have placed the appropriate orders for post-discharge needs.    Review of Systems  Review of Systems   Constitutional: Negative.    Respiratory: Positive for shortness of breath.    Cardiovascular: Negative.    Gastrointestinal: Negative.    All other systems reviewed and are negative.       Physical Exam  Temp:  [35.8 °C (96.4 °F)-36.3 °C (97.4 °F)] 36.1 °C (97 °F)  Pulse:  [76-90] 83  Resp:  [16-18] 18  BP: ()/(66-79) 106/66  SpO2:  [90 %-95 %] 91 %    Physical Exam  Constitutional:       Appearance: Normal appearance.   HENT:      Head: Normocephalic and atraumatic.      Nose: Nose normal.   Eyes:      Conjunctiva/sclera: Conjunctivae normal.   Cardiovascular:      Rate and Rhythm: Normal rate.      Heart sounds: Normal heart sounds.   Pulmonary:      Effort: Pulmonary effort is normal. No respiratory distress.      Breath sounds: No wheezing.   Abdominal:      General: Bowel sounds are normal.      Palpations: Abdomen is soft.   Musculoskeletal:         General: No swelling or tenderness.   Skin:     General: Skin is warm.   Neurological:      General: No focal  deficit present.      Mental Status: He is alert and oriented to person, place, and time.   Psychiatric:         Mood and Affect: Mood normal.         Behavior: Behavior normal.         Fluids  No intake or output data in the 24 hours ending 07/30/21 1315    Laboratory  Recent Labs     07/28/21  0520 07/29/21  0600 07/30/21  0238   WBC 6.7 19.2* 23.2*   RBC 4.70 4.50* 4.52*   HEMOGLOBIN 14.1 13.6* 13.7*   HEMATOCRIT 42.5 40.4* 41.5*   MCV 90.4 89.8 91.8   MCH 30.0 30.2 30.3   MCHC 33.2* 33.7 33.0*   RDW 50.1* 51.3* 52.0*   PLATELETCT 184 230 293   MPV 10.6 12.0 11.1     Recent Labs     07/28/21  0520 07/29/21  0900 07/30/21  0238   SODIUM 139 134* 138   POTASSIUM 3.9 3.7 3.6   CHLORIDE 103 100 101   CO2 18* 22 23   GLUCOSE 161* 161* 124*   BUN 20 28* 40*   CREATININE 1.05 1.04 1.31   CALCIUM 9.0 8.8 8.6                   Imaging  CT-CHEST (THORAX) W/O   Final Result      1.  Moderate right and small left pleural effusions with overlying atelectasis.   2.  Atherosclerotic plaque including coronary artery calcification.   3.  Mildly enlarged mediastinal and right hilar lymph nodes may be reactive.   4.  Cardiomegaly.   5.  1.3 cm left thyroid nodule. Further evaluation with nonemergent thyroid ultrasound is recommended.   6.  2 cm dense right renal lesion may represent a hemorrhagic/proteinaceous cyst but solid mass is not excluded. Further evaluation with renal ultrasound is recommended.   7.  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      Fleischner Society pulmonary nodule recommendations:   Low Risk: No routine follow-up      High Risk: Optional CT at 12 months      Comments: Use most suspicious nodule as guide to management. Follow-up intervals may vary according to size and risk.      Low Risk - Minimal or absent history of smoking and of other known risk factors.      High Risk - History of smoking or of other known risk factors.      Note: These recommendations do not apply to lung cancer screening,  patients with immunosuppression, or patients with known primary cancer.      Fleischner Society 2017 Guidelines for Management of Incidentally Detected Pulmonary Nodules in Adults         EC-ECHOCARDIOGRAM COMPLETE W/O CONT   Final Result      DX-CHEST-PORTABLE (1 VIEW)   Final Result      1.  Radiographic findings are most consistent with interstitial pulmonary edema.      US-THORACENTESIS PUNCTURE RIGHT    (Results Pending)        Assessment/Plan  * Acute on chronic respiratory failure with hypoxia (HCC)- (present on admission)  Assessment & Plan  Initially thought CHF exacerbation vs COPD exacerbation vs possible other underlying lung diseases  No recent records, [facility]  Cont HFNC with improved O2 and continue to wean  Cont diuresis with Lasix 40mg IV daily (switch to po)  Echo - normal LVEF and normal RV function; asymmetric septal hypertrophy; Moderate AR    CT chest with findings of right-sided pleural effusions.  Orders placed for thoracentesis.      Coronary artery disease involving native coronary artery of native heart- (present on admission)  Assessment & Plan  Reports history of Stent many years ago  [facility] or [facility]  C/w Statin, beta blocker  Not on ACEI or ARB at home     Leukocytosis  Assessment & Plan  Likely from steroid     JULIA (obstructive sleep apnea)- (present on admission)  Assessment & Plan  Reports this Dx but denies using CPAP  He states he was given O2 via NC at night for this    BPH (benign prostatic hyperplasia)- (present on admission)  Assessment & Plan  C/w proscar and flomax    CKD (chronic kidney disease), stage III (HCC)- (present on admission)  Assessment & Plan  Stable  Avoid nephrotoxins    Hx of pulmonary embolus- (present on admission)  Assessment & Plan  Hx of   Cont Apixiban    A-fib (HCC)- (present on admission)  Assessment & Plan  Rate controlled  Cont Apixiban    Mixed hyperlipidemia- (present on admission)  Assessment &  Plan  Cont home statin    Type 2 diabetes mellitus (HCC)- (present on admission)  Assessment & Plan  BG goals 140-180s  Lantus 18 units nightly  ISS      Essential hypertension- (present on admission)  Assessment & Plan  Cont home medications of amlodipine, carvedilol, imdur       VTE prophylaxis: therapeutic anticoagulation with Eliquis    I have performed a physical exam and reviewed and updated ROS and Plan today (7/30/2021). In review of yesterday's note (7/29/2021), there are no changes except as documented above.

## 2021-07-30 NOTE — CARE PLAN
The patient is Stable - Low risk of patient condition declining or worsening    Shift Goals  Clinical Goals: Decrease oxygen demand, increase mobility  Patient Goals: Rest    Progress made toward(s) clinical / shift goals:    Problem: Knowledge Deficit - Standard  Goal: Patient and family/care givers will demonstrate understanding of plan of care, disease process/condition, diagnostic tests and medications  Outcome: Progressing     Problem: Respiratory  Goal: Patient will achieve/maintain optimum respiratory ventilation and gas exchange  Outcome: Progressing   Patient on 4L O2 Oxymask  Problem: Fall Risk  Goal: Patient will remain free from falls  Outcome: Progressing       Patient is not progressing towards the following goals:

## 2021-07-30 NOTE — PROGRESS NOTES
Assumed care at 1900, bedside report received from Malu LARRY. Pt. Is Afib on the monitor. Initial assessment completed, orders reviewed, call light within reach, bed alarm not in use, and hourly rounding in place. POC addressed with patient, no additional questions at this time.

## 2021-07-31 ENCOUNTER — PATIENT OUTREACH (OUTPATIENT)
Dept: HEALTH INFORMATION MANAGEMENT | Facility: OTHER | Age: 81
End: 2021-07-31

## 2021-07-31 VITALS
BODY MASS INDEX: 26.87 KG/M2 | TEMPERATURE: 96.9 F | OXYGEN SATURATION: 95 % | WEIGHT: 198.41 LBS | DIASTOLIC BLOOD PRESSURE: 64 MMHG | HEART RATE: 93 BPM | SYSTOLIC BLOOD PRESSURE: 100 MMHG | HEIGHT: 72 IN | RESPIRATION RATE: 18 BRPM

## 2021-07-31 LAB
ANION GAP SERPL CALC-SCNC: 11 MMOL/L (ref 7–16)
BUN SERPL-MCNC: 41 MG/DL (ref 8–22)
CALCIUM SERPL-MCNC: 8.3 MG/DL (ref 8.5–10.5)
CHLORIDE SERPL-SCNC: 100 MMOL/L (ref 96–112)
CO2 SERPL-SCNC: 25 MMOL/L (ref 20–33)
CREAT SERPL-MCNC: 1.17 MG/DL (ref 0.5–1.4)
ERYTHROCYTE [DISTWIDTH] IN BLOOD BY AUTOMATED COUNT: 51.3 FL (ref 35.9–50)
GLUCOSE BLD-MCNC: 109 MG/DL (ref 65–99)
GLUCOSE BLD-MCNC: 192 MG/DL (ref 65–99)
GLUCOSE SERPL-MCNC: 127 MG/DL (ref 65–99)
GRAM STN SPEC: NORMAL
HCT VFR BLD AUTO: 39.6 % (ref 42–52)
HGB BLD-MCNC: 13 G/DL (ref 14–18)
LDH SERPL L TO P-CCNC: 219 U/L (ref 107–266)
MAGNESIUM SERPL-MCNC: 2.2 MG/DL (ref 1.5–2.5)
MCH RBC QN AUTO: 30.2 PG (ref 27–33)
MCHC RBC AUTO-ENTMCNC: 32.8 G/DL (ref 33.7–35.3)
MCV RBC AUTO: 91.9 FL (ref 81.4–97.8)
PLATELET # BLD AUTO: 212 K/UL (ref 164–446)
PMV BLD AUTO: 10.3 FL (ref 9–12.9)
POTASSIUM SERPL-SCNC: 3.5 MMOL/L (ref 3.6–5.5)
RBC # BLD AUTO: 4.31 M/UL (ref 4.7–6.1)
SIGNIFICANT IND 70042: NORMAL
SITE SITE: NORMAL
SODIUM SERPL-SCNC: 136 MMOL/L (ref 135–145)
SOURCE SOURCE: NORMAL
WBC # BLD AUTO: 12.4 K/UL (ref 4.8–10.8)

## 2021-07-31 PROCEDURE — 700102 HCHG RX REV CODE 250 W/ 637 OVERRIDE(OP): Performed by: INTERNAL MEDICINE

## 2021-07-31 PROCEDURE — 700102 HCHG RX REV CODE 250 W/ 637 OVERRIDE(OP): Performed by: STUDENT IN AN ORGANIZED HEALTH CARE EDUCATION/TRAINING PROGRAM

## 2021-07-31 PROCEDURE — 700111 HCHG RX REV CODE 636 W/ 250 OVERRIDE (IP): Performed by: INTERNAL MEDICINE

## 2021-07-31 PROCEDURE — A9270 NON-COVERED ITEM OR SERVICE: HCPCS | Performed by: STUDENT IN AN ORGANIZED HEALTH CARE EDUCATION/TRAINING PROGRAM

## 2021-07-31 PROCEDURE — 99238 HOSP IP/OBS DSCHRG MGMT 30/<: CPT | Performed by: STUDENT IN AN ORGANIZED HEALTH CARE EDUCATION/TRAINING PROGRAM

## 2021-07-31 PROCEDURE — 82962 GLUCOSE BLOOD TEST: CPT

## 2021-07-31 PROCEDURE — A9270 NON-COVERED ITEM OR SERVICE: HCPCS | Performed by: INTERNAL MEDICINE

## 2021-07-31 PROCEDURE — 83615 LACTATE (LD) (LDH) ENZYME: CPT

## 2021-07-31 PROCEDURE — 80048 BASIC METABOLIC PNL TOTAL CA: CPT

## 2021-07-31 PROCEDURE — 83735 ASSAY OF MAGNESIUM: CPT

## 2021-07-31 PROCEDURE — 85027 COMPLETE CBC AUTOMATED: CPT

## 2021-07-31 PROCEDURE — 36415 COLL VENOUS BLD VENIPUNCTURE: CPT

## 2021-07-31 RX ORDER — FUROSEMIDE 20 MG/1
20 TABLET ORAL DAILY
Qty: 60 TABLET | Refills: 3 | Status: ON HOLD | OUTPATIENT
Start: 2021-07-31 | End: 2022-01-22 | Stop reason: SDUPTHER

## 2021-07-31 RX ORDER — PREDNISONE 10 MG/1
TABLET ORAL
Qty: 1 TABLET | Refills: 0 | Status: SHIPPED | OUTPATIENT
Start: 2021-08-02 | End: 2021-08-02

## 2021-07-31 RX ORDER — PREDNISONE 20 MG/1
TABLET ORAL
Qty: 1 TABLET | Refills: 0 | Status: SHIPPED | OUTPATIENT
Start: 2021-08-01 | End: 2022-01-20

## 2021-07-31 RX ADMIN — OMEPRAZOLE 20 MG: 20 CAPSULE, DELAYED RELEASE ORAL at 05:56

## 2021-07-31 RX ADMIN — TAMSULOSIN HYDROCHLORIDE 0.4 MG: 0.4 CAPSULE ORAL at 05:53

## 2021-07-31 RX ADMIN — ISOSORBIDE MONONITRATE 60 MG: 30 TABLET, EXTENDED RELEASE ORAL at 05:53

## 2021-07-31 RX ADMIN — CARVEDILOL 12.5 MG: 12.5 TABLET, FILM COATED ORAL at 05:53

## 2021-07-31 RX ADMIN — FUROSEMIDE 40 MG: 40 TABLET ORAL at 05:53

## 2021-07-31 RX ADMIN — AMLODIPINE BESYLATE 10 MG: 10 TABLET ORAL at 05:53

## 2021-07-31 RX ADMIN — FINASTERIDE 5 MG: 5 TABLET, FILM COATED ORAL at 05:53

## 2021-07-31 RX ADMIN — PREDNISONE 30 MG: 20 TABLET ORAL at 05:55

## 2021-07-31 ASSESSMENT — CHA2DS2 SCORE
DIABETES: YES
VASCULAR DISEASE: NO
AGE 75 OR GREATER: YES
PRIOR STROKE OR TIA OR THROMBOEMBOLISM: NO
SEX: MALE
CHF OR LEFT VENTRICULAR DYSFUNCTION: NO
CHA2DS2 VASC SCORE: 4
HYPERTENSION: YES
AGE 65 TO 74: NO

## 2021-07-31 ASSESSMENT — PAIN DESCRIPTION - PAIN TYPE: TYPE: ACUTE PAIN

## 2021-07-31 NOTE — PROGRESS NOTES
Patient  IV and monitor removed; monitor room notified. Pt left unit via wheelchair to car with daughter. Personal belongings with pt when leaving unit. Pt given discharge instructions prior to leaving unit including where to  prescriptions and when to follow-up; verbalizes understanding.Pt home Oxygen also delivered and with patient at discharge. Copy of discharge instructions with pt and in the chart.

## 2021-07-31 NOTE — FACE TO FACE
"Face to Face Note  -  Durable Medical Equipment    Eduardo Garces D.O. - NPI: [id number]  I certify that this patient is under my care and that they had a durable medical equipment(DME)face to face encounter by myself that meets the physician DME face-to-face encounter requirements with this patient on:    Date of encounter:   Patient:                    MRN:                       YOB: 2019  Oleg Cook  [id number]  1940     The encounter with the patient was in whole, or in part, for the following medical condition, which is the primary reason for durable medical equipment:  COPD    I certify that, based on my findings, the following durable medical equipment is medically necessary:  Oxygen.    HOME O2 Saturation Measurements:(Values must be present for Home Oxygen orders)  Room air sat at rest: 88  Room air sat with amb: 84  With liters of O2: 1, O2 sat at rest with O2: 95  With Liters of O2: 2, O2 sat with amb with O2 : 95  Is the patient mobile?: Yes    My Clinical findings support the need for the above equipment due to:  Hypoxia    Supporting Symptoms: The patient requires supplemental oxygen, as the following interventions have been tried with limited or no improvement: \"Ambulation with oximetry    If patient feels more short of breath, they can go up to 6 liters per minute and contact healthcare provider.  "

## 2021-07-31 NOTE — DISCHARGE SUMMARY
Discharge Summary    CHIEF COMPLAINT ON ADMISSION  Chief Complaint   Patient presents with   • Shortness of Breath     x2days       Reason for Admission  ems     Admission Date  7/27/2021    CODE STATUS  Prior    HPI & HOSPITAL COURSE  This is a 81 y.o. male admitted on 7/27/2021 with acute on chronic hypoxic respiratory failure.  He required a brief ICU admission and was treated with BiPAP and HFNC. He was treated with steroids and IV Lasix for concern for COPD plus minus CHF. 2D echo showed preserved EF and moderate aortic insufficiency.  CT chest did show findings concerning for right-sided moderate pleural effusions. He subsequently required a right-sided thoracentesis with about 1.5 L of fluids removed. Fluid studies were mostly consistent with transudative effusions. Postprocedure, he felt improved and his oxygen requirement was weaned down to his baseline prior to discharge.  Was discharged home on few days worth of steroids and Lasix and advised to follow-up with his PCP for referral to pulmonology for further testing.    Therefore, he is discharged in fair and stable condition to home with close outpatient follow-up.    The patient met 2-midnight criteria for an inpatient stay at the time of discharge.    Discharge Date  7/31/21    FOLLOW UP ITEMS POST DISCHARGE  none    DISCHARGE DIAGNOSES  Principal Problem:    Acute on chronic respiratory failure with hypoxia (HCC) POA: Yes  Active Problems:    Essential hypertension POA: Yes    Type 2 diabetes mellitus (HCC) POA: Yes      Overview: 3/16/11: Microalbumin/Cr ratio: 13    Mixed hyperlipidemia POA: Yes      Overview: 3/16/11: 126/91/42/66    A-fib (HCC) POA: Yes    Hx of pulmonary embolus POA: Yes    CKD (chronic kidney disease), stage III (HCC) POA: Yes    BPH (benign prostatic hyperplasia) POA: Yes    JULIA (obstructive sleep apnea) POA: Yes    Leukocytosis POA: Unknown    Coronary artery disease involving native coronary artery of native heart POA:  Yes  Resolved Problems:    * No resolved hospital problems. *      FOLLOW UP  No future appointments.  [facility]  [address] [id number] [phone]  Call  [facility] to [facility] with a Primary Care Provider. Thank you.      MEDICATIONS ON DISCHARGE     Medication List      START taking these medications      Instructions   furosemide 20 MG Tabs  Commonly known as: LASIX   Take 1 tablet by mouth every day.  Dose: 20 mg     * predniSONE 20 MG Tabs  Start taking on: August 1, 2021  Commonly known as: DELTASONE   For copd     * predniSONE 10 MG Tabs  Start taking on: August 2, 2021  Commonly known as: DELTASONE   For copd         * This list has 2 medication(s) that are the same as other medications prescribed for you. Read the directions carefully, and ask your doctor or other care provider to review them with you.            CONTINUE taking these medications      Instructions   amLODIPine 10 MG Tabs  Commonly known as: NORVASC   TAKE 1 TABLET EVERY EVENING     apixaban 5mg Tabs  Commonly known as: ELIQUIS   Take 5 mg by mouth 2 times a day.  Dose: 5 mg     atorvastatin 40 MG Tabs  Commonly known as: LIPITOR   Take 40 mg by mouth every evening.  Dose: 40 mg     carvedilol 12.5 MG Tabs  Commonly known as: COREG   Take 12.5 mg by mouth 2 times a day.  Dose: 12.5 mg     Empagliflozin 25 MG Tabs   Take 25 mg by mouth every day.  Dose: 25 mg     finasteride 5 MG Tabs  Commonly known as: PROSCAR   Take 5 mg by mouth every day.  Dose: 5 mg     glipiZIDE 5 MG Tabs  Commonly known as: GLUCOTROL   Take 5 mg by mouth before evening meal.  Dose: 5 mg     isosorbide mononitrate SR 60 MG Tb24  Commonly known as: IMDUR   Take 1 Tab by mouth every morning. Indications: Chronic Angina Pectoris  Dose: 60 mg     omeprazole 20 MG delayed-release capsule  Commonly known as: PRILOSEC   Take 20 mg by mouth every day.  Dose: 20 mg     tamsulosin 0.4 MG  capsule  Commonly known as: FLOMAX   Take 0.4 mg by mouth every day.  Dose: 0.4 mg            Allergies  No Known Allergies    DIET  Orders Placed This Encounter   Procedures   • Diet Order Diet: Consistent CHO (Diabetic)     Standing Status:   Standing     Number of Occurrences:   1     Order Specific Question:   Diet:     Answer:   Consistent CHO (Diabetic) [4]       ACTIVITY  As tolerated.  Weight bearing as tolerated    CONSULTATIONS  Critical care    PROCEDURES  Right-sided thoracentesis    LABORATORY  Lab Results   Component Value Date    SODIUM 136 07/31/2021    POTASSIUM 3.5 (L) 07/31/2021    CHLORIDE 100 07/31/2021    CO2 25 07/31/2021    GLUCOSE 127 (H) 07/31/2021    BUN 41 (H) 07/31/2021    CREATININE 1.17 07/31/2021    CREATININE 1.0 05/18/2009        Lab Results   Component Value Date    WBC 12.4 (H) 07/31/2021    HEMOGLOBIN 13.0 (L) 07/31/2021    HEMATOCRIT 39.6 (L) 07/31/2021    PLATELETCT 212 07/31/2021        Total time of the discharge process exceeds 20 minutes.

## 2021-07-31 NOTE — CARE PLAN
The patient is: Stable - Low risk of patient condition declining or worsening    Progress made toward(s) clinical / shift goals:  Patient is hemodynamically stable and free from falls.  Patient's O2 weaned to 2L.  Patient reports no pain.    Patient is not progressing towards the following goals:  N/A        Problem: Knowledge Deficit - Standard  Goal: Patient and family/care givers will demonstrate understanding of plan of care, disease process/condition, diagnostic tests and medications  Outcome: Progressing     Problem: Respiratory  Goal: Patient will achieve/maintain optimum respiratory ventilation and gas exchange  Outcome: Progressing     Problem: Fall Risk  Goal: Patient will remain free from falls  Outcome: Progressing     Problem: Knowledge Deficit - COPD  Goal: Patient/significant other demonstrates understanding of disease process, utilization of the Action Plan, medications and discharge instruction  Outcome: Progressing     Problem: Risk for Infection - COPD  Goal: Patient will remain free from signs and symptoms of infection  Outcome: Progressing     Problem: Nutrition - Advanced  Goal: Patient will display progressive weight gain toward goal have adequate food and fluid intake  Outcome: Progressing     Problem: Ineffective Airway Clearance  Goal: Patient will maintain patent airway with clear/clearing breath sounds  Outcome: Progressing     Problem: Impaired Gas Exchange  Goal: Patient will demonstrate improved ventilation and adequate oxygenation and participate in treatment regimen within the level of ability/situation.  Outcome: Progressing     Problem: Risk for Aspiration  Goal: Patient's risk for aspiration will be absent or decrease  Outcome: Progressing     Problem: Self Care  Goal: Patient will have the ability to perform ADLs independently or with assistance (bathe, groom, dress, toilet and feed)  Outcome: Progressing

## 2021-07-31 NOTE — PROGRESS NOTES
12-Hour CC    Monitor Summary  A-fib w/ occasional PVC  Bundle branch block  Run of junctional rhythm at 20:12  71-90 bpm  -/.13/-

## 2021-07-31 NOTE — DISCHARGE INSTRUCTIONS
Discharge Instructions    Discharged to home by car with relative. Discharged via wheelchair, hospital escort: Yes.  Special equipment needed: Oxygen    Be sure to schedule a follow-up appointment with your primary care doctor or any specialists as instructed.     Discharge Plan:        I understand that a diet low in cholesterol, fat, and sodium is recommended for good health. Unless I have been given specific instructions below for another diet, I accept this instruction as my diet prescription.   Other diet: Cardiac/ Diabetic    Special Instructions: None    · Is patient discharged on Warfarin / Coumadin?   No     Depression / Suicide Risk    As you are [facility], it is important to learn how to keep safe from harming yourself.    Recognize the warning signs:  · Abrupt changes in personality, positive or negative- including increase in energy   · Giving away possessions  · Change in eating patterns- significant weight changes-  positive or negative  · Change in sleeping patterns- unable to sleep or sleeping all the time   · Unwillingness or inability to communicate  · Depression  · Unusual sadness, discouragement and loneliness  · Talk of wanting to die  · Neglect of personal appearance   · Rebelliousness- reckless behavior  · Withdrawal from people/activities they love  · Confusion- inability to concentrate     If you or a loved one observes any of these behaviors or has concerns about self-harm, here's what you can do:  · Talk about it- your feelings and reasons for harming yourself  · Remove any means that you might use to hurt yourself (examples: pills, rope, extension cords, firearm)  · Get professional help from the community (Mental Health, Substance Abuse, psychological counseling)  · Do not be alone:Call your Safe Contact- someone whom you trust who will be there for you.  · Call your local CRISIS HOTLINE 392-7988 or [phone]  · Call your local Children's Mobile  Crisis Response Team [place] (350) 570-6473 or www.[employer]  · Call the toll free National Suicide Prevention Hotlines   · National Suicide Prevention Lifeline 021-033-OGDL (9061)  · National Hope Line Network 800-SUICIDE (100-1552)        Thoracentesis, Care After  This sheet gives you information about how to care for yourself after your procedure. Your health care provider may also give you more specific instructions. If you have problems or questions, contact your health care provider.  What can I expect after the procedure?  After your procedure, it is common to have some pain at the site where the needle was inserted (puncture site).  Follow these instructions at home:    Care of the puncture site  · Follow instructions from your health care provider about how to take care of your puncture site. Make sure you:  ? Wash your hands with soap and water before you change your bandage (dressing). If soap and water are not available, use hand .  ? Change your dressing as told by your health care provider.  · Check the puncture site every day for signs of infection. Check for:  ? Redness, swelling, or pain.  ? Fluid or blood.  ? Warmth.  ? Pus or a bad smell.  · Do not take baths, swim, or use a hot tub until your health care provider approves.  General instructions  · Take over-the-counter and prescription medicines only as told by your health care provider.  · Do not drive for 24 hours if you were given a medicine to help you relax (sedative) during your procedure.  · Drink enough fluid to keep your urine pale yellow.  · You may return to your normal diet and normal activities as told by your health care provider.  · Keep all follow-up visits as told by your health care provider. This is important.  Contact a health care provider if you:  · Have redness, swelling, or pain at your puncture site.  · Have fluid or blood coming from your puncture site.  · Notice that your puncture site feels warm  to the touch.  · Have pus or a bad smell coming from your puncture site.  · Have a fever.  · Have chills.  · Have nausea or vomiting.  · Have trouble breathing.  · Develop a worsening cough.  Get help right away if you:  · Have extreme shortness of breath.  · Develop chest pain.  · Faint or feel light-headed.  Summary  · After your procedure, it is common to have some pain at the site where the needle was inserted (puncture site).  · Wash your hands with soap and water before you change your bandage (dressing).  · Check your puncture site every day for signs of infection.  · Take over-the-counter and prescription medicines only as told by your health care provider.  This information is not intended to replace advice given to you by your health care provider. Make sure you discuss any questions you have with your health care provider.  Document Released: 01/08/2016 Document Revised: 11/30/2018 Document Reviewed: 11/12/2018  [employer] Patient Education © 2020 [employer]        Home Oxygen Use, Adult  When a medical condition keeps you from getting enough oxygen, your health care provider may instruct you to take extra oxygen at home. Your health care provider will let you know:  · When to take oxygen.  · For how long to take oxygen.  · How quickly oxygen should be delivered (flow rate), in liters per minute (LPM or L/M).  Home oxygen can be given through:  · A mask.  · A nasal cannula. This is a device or tube that goes in the nostrils.  · A transtracheal catheter. This is a small, flexible tube placed in the trachea.  · A tracheostomy. This is a surgically made opening in the trachea.  These devices are connected with tubing to an oxygen source, such as:  · A tank. Tanks hold oxygen in gas form. They must be replaced when the oxygen is used up.  · A liquid oxygen device. This holds oxygen in liquid form. It must be replaced when the oxygen is used up.  · An oxygen concentrator machine. This filters oxygen in the  room. It uses electricity, so you must have a backup cylinder of oxygen in case the power goes out.  Supplies needed:  To use oxygen, you will need:  · A mask, nasal cannula, transtracheal catheter, or tracheostomy.  · An oxygen tank, a liquid oxygen device, or an oxygen concentrator.  · The tape that your health care provider recommends (optional).  If you use a transtracheal catheter and your prescribed flow rate is 1 LPM or greater, you will also need a humidifier.  Risks and complications  · Fire. This can happen if the oxygen is exposed to a heat source, flame, or spark.  · Injury to skin. This can happen if liquid oxygen touches your skin.  · Organ damage. This can happen if you get too little oxygen.  How to use oxygen  Your health care provider or a representative from your medical device company will show you how to use your oxygen device. Follow her or his instructions. The instructions may look something like this:  1. Wash your hands.  2. If you use an oxygen concentrator, make sure it is plugged in.  3. Place one end of the tube into the port on the tank, device, or machine.  4. Place the mask over your nose and mouth. Or, place the nasal cannula and secure it with tape if instructed. If you use a tracheostomy or transtracheal catheter, connect it to the oxygen source as directed.  5. Make sure the liter-flow setting on the machine is at the level prescribed by your health care provider.  6. Turn on the machine or adjust the knob on the tank or device to the correct liter-flow setting.  7. When you are done, turn off and unplug the machine, or turn the knob to OFF.  How to clean and care for the oxygen supplies  Nasal cannula  · Clean it with a warm, wet cloth daily or as needed.  · Wash it with a liquid soap once a week.  · Rinse it thoroughly once or twice a week.  · Replace it every 2-4 weeks.  · If you have an infection, such as a cold or pneumonia, change the cannula when you get  "better.  Mask  · Replace it every 2-4 weeks.  · If you have an infection, such as a cold or pneumonia, change the mask when you get better.  Humidifier bottle  · Wash the bottle between each refill:  ? Wash it with soap and warm water.  ? Rinse it thoroughly.  ? Disinfect it and its top.  ? Air-dry it.  · Make sure it is dry before you refill it.  Oxygen concentrator  · Clean the air filter at least twice a week according to directions from your home medical equipment and service company.  · Wipe down the cabinet every day. To do this:  ? Unplug the unit.  ? Wipe down the cabinet with a damp cloth.  ? Dry the cabinet.  Other equipment  · Change any extra tubing every 1-3 months.  · Follow instructions from your health care provider about taking care of any other equipment.  Safety tips  Fire safety tips    · Keep your oxygen and oxygen supplies at least 5 ft away from sources of heat, flames, and davenport at all times.  · Do not allow smoking near your oxygen. Put up \"no smoking\" signs in your home. Avoid smoking areas when in public.  · Do not use materials that can burn (are flammable) while you use oxygen.  · When you go to a restaurant with portable oxygen, ask to be seated in the nonsmoking section.  · Keep a fire extinguisher close by. Let your fire department know that you have oxygen in your home.  · Test your home smoke detectors regularly.  Traveling  · Secure your oxygen tank in the vehicle so that it does not move around. Follow instructions from your medical device company about how to safely secure your tank.  · Make sure you have enough oxygen for the amount of time you will be away from home.  · If you are planning air travel, contact the airline to find out if they allow the use of an approved portable oxygen concentrator. You may also need documents from your health care provider and medical device company before you travel.  General safety tips  · If you use an oxygen cylinder, make sure it is in a " stand or secured to an object that will not move (fixed object).  · If you use liquid oxygen, make sure its container is kept upright.  · If you use an oxygen concentrator:  ? Tell your electric company. Make sure you are given priority service in the event that your power goes out.  ? Avoid using extension cords, if possible.  Follow these instructions at home:  · Use oxygen only as told by your health care provider.  · Do not use alcohol or other drugs that make you relax (sedating drugs) unless instructed. They can slow down your breathing rate and make it hard to get in enough oxygen.  · Know how and when to order a refill of oxygen.  · Always keep a spare tank of oxygen. Plan ahead for holidays when you may not be able to get a prescription filled.  · Use water-based lubricants on your lips or nostrils. Do not use oil-based products like petroleum jelly.  · To prevent skin irritation on your cheeks or behind your ears, tuck some gauze under the tubing.  Contact a health care provider if:  · You get headaches often.  · You have shortness of breath.  · You have a lasting cough.  · You have anxiety.  · You are sleepy all the time.  · You develop an illness that affects your breathing.  · You cannot exercise at your regular level.  · You are restless.  · You have difficult or irregular breathing, and it is getting worse.  · You have a fever.  · You have persistent redness under your nose.  Get help right away if:  · You are confused.  · You have blue lips or fingernails.  · You are struggling to breathe.  Summary  · Your health care provider or a representative from your medical device company will show you how to use your oxygen device. Follow her or his instructions.  · If you use an oxygen concentrator, make sure it is plugged in.  · Make sure the liter-flow setting on the machine is at the level prescribed by your health care provider.  · Keep your oxygen and oxygen supplies at least 5 ft away from sources of  heat, flames, and davenport at all times.  This information is not intended to replace advice given to you by your health care provider. Make sure you discuss any questions you have with your health care provider.  Document Released: 03/09/2005 Document Revised: 06/06/2019 Document Reviewed: 07/11/2017  [employer] Patient Education © 2020 [employer]    Furosemide tablets  What is this medicine?  FUROSEMIDE (fyoor [place] se mide) is a diuretic. It helps you make more urine and to lose salt and excess water from your body. This medicine is used to treat high blood pressure, and edema or swelling from heart, kidney, or liver disease.  This medicine may be used for other purposes; ask your health care provider or pharmacist if you have questions.  COMMON BRAND NAME(S): Active-Medicated Specimen Kit, Delone, Diuscreen, Lasix, RX Specimen Collection Kit, Specimen Collection Kit, URINX Medicated Specimen Collection  What should I tell my health care provider before I take this medicine?  They need to know if you have any of these conditions:  · abnormal blood electrolytes  · diarrhea or vomiting  · gout  · heart disease  · kidney disease, small amounts of urine, or difficulty passing urine  · liver disease  · thyroid disease  · an unusual or allergic reaction to furosemide, sulfa drugs, other medicines, foods, dyes, or preservatives  · pregnant or trying to get pregnant  · breast-feeding  How should I use this medicine?  Take this medicine by mouth with a glass of water. Follow the directions on the prescription label. You may take this medicine with or without food. If it upsets your stomach, take it with food or milk. Do not take your medicine more often than directed. Remember that you will need to pass more urine after taking this medicine. Do not take your medicine at a time of day that will cause you problems. Do not take at bedtime.  Talk to your pediatrician regarding the use of this medicine in children. While this drug  may be prescribed for selected conditions, precautions do apply.  Overdosage: If you think you have taken too much of this medicine contact a poison control center or emergency room at once.  NOTE: This medicine is only for you. Do not share this medicine with others.  What if I miss a dose?  If you miss a dose, take it as soon as you can. If it is almost time for your next dose, take only that dose. Do not take double or extra doses.  What may interact with this medicine?  · aspirin and aspirin-like medicines  · certain antibiotics  · chloral hydrate  · cisplatin  · cyclosporine  · digoxin  · diuretics  · laxatives  · lithium  · medicines for blood pressure  · medicines that relax muscles for surgery  · methotrexate  · NSAIDs, medicines for pain and inflammation like ibuprofen, naproxen, or indomethacin  · phenytoin  · steroid medicines like prednisone or cortisone  · sucralfate  · thyroid hormones  This list may not describe all possible interactions. Give your health care provider a list of all the medicines, herbs, non-prescription drugs, or dietary supplements you use. Also tell them if you smoke, drink alcohol, or use illegal drugs. Some items may interact with your medicine.  What should I watch for while using this medicine?  Visit your doctor or health care provider for regular checks on your progress. Check your blood pressure regularly. Ask your doctor or health care provider what your blood pressure should be, and when you should contact him or her. If you are a diabetic, check your blood sugar as directed.  This medicine may cause serious skin reactions. They can happen weeks to months after starting the medicine. Contact your health care provider right away if you notice fevers or flu-like symptoms with a rash. The rash may be red or purple and then turn into blisters or peeling of the skin. Or, you might notice a red rash with swelling of the face, lips or lymph nodes in your neck or under your  arms.  You may need to be on a special diet while taking this medicine. Check with your doctor. Also, ask how many glasses of fluid you need to drink a day. You must not get dehydrated.  You may get drowsy or dizzy. Do not drive, use machinery, or do anything that needs mental alertness until you know how this drug affects you. Do not stand or sit up quickly, especially if you are an older patient. This reduces the risk of dizzy or fainting spells. Alcohol can make you more drowsy and dizzy. Avoid alcoholic drinks.  This medicine can make you more sensitive to the sun. Keep out of the sun. If you cannot avoid being in the sun, wear protective clothing and use sunscreen. Do not use sun lamps or tanning beds/booths.  What side effects may I notice from receiving this medicine?  Side effects that you should report to your doctor or health care professional as soon as possible:  · blood in urine or stools  · dry mouth  · fever or chills  · hearing loss or ringing in the ears  · irregular heartbeat  · muscle pain or weakness, cramps  · rash, fever, and swollen lymph nodes  · redness, blistering, peeling or loosening of the skin, including inside the mouth  · skin rash  · stomach upset, pain, or nausea  · tingling or numbness in the hands or feet  · unusually weak or tired  · vomiting or diarrhea  · yellowing of the eyes or skin  Side effects that usually do not require medical attention (report to your doctor or health care professional if they continue or are bothersome):  · headache  · loss of appetite  · unusual bleeding or bruising  This list may not describe all possible side effects. Call your doctor for medical advice about side effects. You may report side effects to FDA at 1-463-FDA-7983.  Where should I keep my medicine?  Keep out of the reach of children.  Store at room temperature between 15 and 30 degrees C (59 and 86 degrees F). Protect from light. Throw away any unused medicine after the expiration  date.  NOTE: This sheet is a summary. It may not cover all possible information. If you have questions about this medicine, talk to your doctor, pharmacist, or health care provider.  © 2020 [employer] (2020-03-20 14:04:13)      Prednisone tablets  What is this medicine?  PREDNISONE (PRED ni sone) is a corticosteroid. It is commonly used to treat inflammation of the skin, joints, lungs, and other organs. Common conditions treated include asthma, allergies, and arthritis. It is also used for other conditions, such as blood disorders and diseases of the adrenal glands.  This medicine may be used for other purposes; ask your health care provider or pharmacist if you have questions.  COMMON BRAND NAME(S): Deltasone, Predone, Sterapred, Sterapred DS  What should I tell my health care provider before I take this medicine?  They need to know if you have any of these conditions:  · Cushing's syndrome  · diabetes  · glaucoma  · heart disease  · high blood pressure  · infection (especially a virus infection such as chickenpox, cold sores, or herpes)  · kidney disease  · liver disease  · mental illness  · myasthenia gravis  · osteoporosis  · seizures  · stomach or intestine problems  · thyroid disease  · an unusual or allergic reaction to lactose, prednisone, other medicines, foods, dyes, or preservatives  · pregnant or trying to get pregnant  · breast-feeding  How should I use this medicine?  Take this medicine by mouth with a glass of water. Follow the directions on the prescription label. Take this medicine with food. If you are taking this medicine once a day, take it in the morning. Do not take more medicine than you are told to take. Do not suddenly stop taking your medicine because you may develop a severe reaction. Your doctor will tell you how much medicine to take. If your doctor wants you to stop the medicine, the dose may be slowly lowered over time to avoid any side effects.  Talk to your pediatrician  regarding the use of this medicine in children. Special care may be needed.  Overdosage: If you think you have taken too much of this medicine contact a poison control center or emergency room at once.  NOTE: This medicine is only for you. Do not share this medicine with others.  What if I miss a dose?  If you miss a dose, take it as soon as you can. If it is almost time for your next dose, talk to your doctor or health care professional. You may need to miss a dose or take an extra dose. Do not take double or extra doses without advice.  What may interact with this medicine?  Do not take this medicine with any of the following medications:  · metyrapone  · mifepristone  This medicine may also interact with the following medications:  · aminoglutethimide  · amphotericin B  · aspirin and aspirin-like medicines  · barbiturates  · certain medicines for diabetes, like glipizide or glyburide  · cholestyramine  · cholinesterase inhibitors  · cyclosporine  · digoxin  · diuretics  · ephedrine  · female hormones, like estrogens and birth control pills  · isoniazid  · ketoconazole  · NSAIDS, medicines for pain and inflammation, like ibuprofen or naproxen  · phenytoin  · rifampin  · toxoids  · vaccines  · warfarin  This list may not describe all possible interactions. Give your health care provider a list of all the medicines, herbs, non-prescription drugs, or dietary supplements you use. Also tell them if you smoke, drink alcohol, or use illegal drugs. Some items may interact with your medicine.  What should I watch for while using this medicine?  Visit your doctor or health care professional for regular checks on your progress. If you are taking this medicine over a prolonged period, carry an identification card with your name and address, the type and dose of your medicine, and your doctor's name and address.  This medicine may increase your risk of getting an infection. Tell your doctor or health care professional if you  are around anyone with measles or chickenpox, or if you develop sores or blisters that do not heal properly.  If you are going to have surgery, tell your doctor or health care professional that you have taken this medicine within the last twelve months.  Ask your doctor or health care professional about your diet. You may need to lower the amount of salt you eat.  This medicine may increase blood sugar. Ask your healthcare provider if changes in diet or medicines are needed if you have diabetes.  What side effects may I notice from receiving this medicine?  Side effects that you should report to your doctor or health care professional as soon as possible:  · allergic reactions like skin rash, itching or hives, swelling of the face, lips, or tongue  · changes in emotions or moods  · changes in vision  · depressed mood  · eye pain  · fever or chills, cough, sore throat, pain or difficulty passing urine  · signs and symptoms of high blood sugar such as being more thirsty or hungry or having to urinate more than normal. You may also feel very tired or have blurry vision.  · swelling of ankles, feet  Side effects that usually do not require medical attention (report to your doctor or health care professional if they continue or are bothersome):  · confusion, excitement, restlessness  · headache  · nausea, vomiting  · skin problems, acne, thin and shiny skin  · trouble sleeping  · weight gain  This list may not describe all possible side effects. Call your doctor for medical advice about side effects. You may report side effects to FDA at 6-279-FDA-0527.  Where should I keep my medicine?  Keep out of the reach of children.  Store at room temperature between 15 and 30 degrees C (59 and 86 degrees F). Protect from light. Keep container tightly closed. Throw away any unused medicine after the expiration date.  NOTE: This sheet is a summary. It may not cover all possible information. If you have questions about this medicine,  talk to your doctor, pharmacist, or health care provider.  © 2020 [employer] (2019-09-17 10:54:22)        Shortness of Breath, Adult  Shortness of breath means you have trouble breathing. Shortness of breath could be a sign of a medical problem.  Follow these instructions at home:    · Watch for any changes in your symptoms.  · Do not use any products that contain nicotine or tobacco, such as cigarettes, e-cigarettes, and chewing tobacco.  · Do not smoke. Smoking can cause shortness of breath. If you need help to quit smoking, ask your doctor.  · Avoid things that can make it harder to breathe, such as:  ? Mold.  ? Dust.  ? Air pollution.  ? Chemical smells.  ? Things that can cause allergy symptoms (allergens), if you have allergies.  · Keep your living space clean. Use products that help remove mold and dust.  · Rest as needed. Slowly return to your normal activities.  · Take over-the-counter and prescription medicines only as told by your doctor. This includes oxygen therapy and inhaled medicines.  · Keep all follow-up visits as told by your doctor. This is important.  Contact a doctor if:  · Your condition does not get better as soon as expected.  · You have a hard time doing your normal activities, even after you rest.  · You have new symptoms.  Get help right away if:  · Your shortness of breath gets worse.  · You have trouble breathing when you are resting.  · You feel light-headed or you pass out (faint).  · You have a cough that is not helped by medicines.  · You cough up blood.  · You have pain with breathing.  · You have pain in your chest, arms, shoulders, or belly (abdomen).  · You have a fever.  · You cannot walk up stairs.  · You cannot exercise the way you normally do.  These symptoms may represent a serious problem that is an emergency. Do not wait to see if the symptoms will go away. Get medical help right away. Call your local emergency services (911 in the [place] Do not drive yourself  to the hospital.  Summary  · Shortness of breath is when you have trouble breathing enough air. It can be a sign of a medical problem.  · Avoid things that make it hard for you to breathe, such as smoking, pollution, mold, and dust.  · Watch for any changes in your symptoms. Contact your doctor if you do not get better or you get worse.  This information is not intended to replace advice given to you by your health care provider. Make sure you discuss any questions you have with your health care provider.  Document Released: 06/05/2009 Document Revised: 05/20/2019 Document Reviewed: 05/20/2019  Elsevier Patient Education © 2020 [employer]

## 2021-07-31 NOTE — PROGRESS NOTES
Assumed care of patient. Bedside report, received from Malu LARRY. Updated POC, call light within reach, and fall precautions in place. Bed locked and and in lowest position. Patient instructed to call for assistance before getting out of bed. All questions answered, no further needs at this time.

## 2021-07-31 NOTE — DISCHARGE PLANNING
Anticipated Discharge Disposition: Home with Home O2    Action: Spoke to pt at bedside, pt is on service with [employer], contacted [employer] who states they will bring a portable for pt to dc to home. Pt has new order. DPA to send new order to [employer] for new baseline.     Barriers to Discharge: O2 delivery    Plan: pt to dc to home when [employer] delivers O2, pt will need to contact [employer] when he gets home to ensure that he has adequate O2 for home.

## 2021-07-31 NOTE — PROGRESS NOTES
Received phone call from Reina at [facility] requesting clarification on directions for 2 prescriptions for prednisone sent over today.  Received specific directions from Dr. Garces and called Reina back at [facility] with directions.      Directions include:    Prednisone 20 mg tablet, 1 tablet by mouth in the morning on August 1, 2021    Prednisone 10 mg tablet, 1 tablet by mouth in the morning on August 2, 2021

## 2021-07-31 NOTE — PROGRESS NOTES
Bedside shift report received from LARON Toribio.  Safety check complete.  All patient needs met at this time.

## 2021-08-01 LAB
BACTERIA BLD CULT: NORMAL
BACTERIA BLD CULT: NORMAL
SIGNIFICANT IND 70042: NORMAL
SIGNIFICANT IND 70042: NORMAL
SITE SITE: NORMAL
SITE SITE: NORMAL
SOURCE SOURCE: NORMAL
SOURCE SOURCE: NORMAL

## 2021-08-04 LAB
BACTERIA FLD AEROBE CULT: NORMAL
GRAM STN SPEC: NORMAL
SIGNIFICANT IND 70042: NORMAL
SITE SITE: NORMAL
SOURCE SOURCE: NORMAL

## 2021-08-09 NOTE — CARE PLAN
The patient is Stable - Low risk of patient condition declining or worsening    Shift Goals  Clinical Goals: (P) Discharge   Patient Goals: (P) Discharge      Problem: Knowledge Deficit - Standard  Goal: Patient and family/care givers will demonstrate understanding of plan of care, disease process/condition, diagnostic tests and medications  Outcome: Progressing     Problem: Respiratory  Goal: Patient will achieve/maintain optimum respiratory ventilation and gas exchange  Outcome: Progressing       Progress made toward(s) clinical / shift goals:  Pt was educated on POC, doctor has placed D/C orders. Pt currently on 3L of O2, home O2 walk performed prior to D/C.            - - -

## 2021-08-27 ENCOUNTER — HOSPITAL ENCOUNTER (EMERGENCY)
Facility: MEDICAL CENTER | Age: 81
End: 2021-08-27
Attending: EMERGENCY MEDICINE
Payer: COMMERCIAL

## 2021-08-27 VITALS
HEIGHT: 72 IN | RESPIRATION RATE: 18 BRPM | DIASTOLIC BLOOD PRESSURE: 88 MMHG | SYSTOLIC BLOOD PRESSURE: 165 MMHG | BODY MASS INDEX: 26.01 KG/M2 | TEMPERATURE: 98.8 F | HEART RATE: 75 BPM | OXYGEN SATURATION: 98 % | WEIGHT: 192 LBS

## 2021-08-27 DIAGNOSIS — R04.0 EPISTAXIS: ICD-10-CM

## 2021-08-27 PROCEDURE — A9270 NON-COVERED ITEM OR SERVICE: HCPCS | Performed by: EMERGENCY MEDICINE

## 2021-08-27 PROCEDURE — 700102 HCHG RX REV CODE 250 W/ 637 OVERRIDE(OP): Performed by: EMERGENCY MEDICINE

## 2021-08-27 PROCEDURE — 99283 EMERGENCY DEPT VISIT LOW MDM: CPT

## 2021-08-27 RX ORDER — OXYMETAZOLINE HYDROCHLORIDE 0.05 G/100ML
2 SPRAY NASAL ONCE
Status: COMPLETED | OUTPATIENT
Start: 2021-08-27 | End: 2021-08-27

## 2021-08-27 RX ADMIN — OXYMETAZOLINE HCL 2 SPRAY: 0.05 SPRAY NASAL at 19:24

## 2021-08-27 ASSESSMENT — FIBROSIS 4 INDEX: FIB4 SCORE: 1.58

## 2021-08-28 NOTE — ED PROVIDER NOTES
ER Provider Note     Scribed for Richard Luciano M.D. by Mynor Calderon. 2021, 7:15 PM.    Primary Care Provider: No primary care provider noted.  Means of Arrival: EMS  History obtained from: Patient  History limited by: None     CHIEF COMPLAINT  Chief Complaint   Patient presents with    Nose Bleed     BIBA for nose bleed; 2hrs been bleeding, on eliquis. Nose has been packed twice.     HPI  Oleg Cook is a 81 y.o. male with a history of atrial fibrillation who presents to the Emergency Department via EMS for worsening nose bleed onset 2 hours ago. The patient reports associated dryness and itchiness surrounding his nose. No alleviating or exacerbating factors were identified. He states that he is currently taking Eliquis and is on 2L oxygen at night.     REVIEW OF SYSTEMS  See HPI for further details.    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    SURGICAL HISTORY  patient denies any surgical history    SOCIAL HISTORY  Social History     Tobacco Use    Smoking status: Former Smoker     Packs/day: 1.00     Years: 20.00     Pack years: 20.00     Types: Cigarettes     Quit date: 1974     Years since quittin.8    Smokeless tobacco: Never Used   Substance Use Topics    Alcohol use: Yes     Alcohol/week: 0.5 oz     Types: 1 drink(s) per week    Drug use: No      Social History     Substance and Sexual Activity   Drug Use No       FAMILY HISTORY  Family History    Problem Relation Age of Onset    Stroke Mother         brain anuerysm hemorrhage    Heart Disease Father         MI    Hypertension Father     Cancer Brother         Prostate CA       CURRENT MEDICATIONS  Home Medications       Reviewed by Yosef Woods R.N. (Registered Nurse) on 08/27/21 at 1917  Med List Status: Not Addressed     Medication Last Dose Status   amlodipine (NORVASC) 10 MG TABS  Active   apixaban (ELIQUIS) 5mg Tab  Active   atorvastatin (LIPITOR) 40 MG Tab  Active   carvedilol (COREG) 12.5 MG Tab  Active   Empagliflozin 25 MG Tab  Active   finasteride (PROSCAR) 5 MG Tab  Active   furosemide (LASIX) 20 MG Tab  Active   glipiZIDE (GLUCOTROL) 5 MG Tab  Active   isosorbide mononitrate SR (IMDUR) 60 MG TB24  Active   omeprazole (PRILOSEC) 20 MG delayed-release capsule  Active   predniSONE (DELTASONE) 20 MG Tab  Active   tamsulosin (FLOMAX) 0.4 MG capsule  Active                  ALLERGIES  No Known Allergies    PHYSICAL EXAM  VITAL SIGNS: BP (!) 167/91   Pulse 78   Temp 37 °C (98.6 °F) (Temporal)   Resp 18   Ht 1.829 m (6')   Wt 87.1 kg (192 lb)   SpO2 98%   BMI 26.04 kg/m²   Constitutional: Alert in no apparent distress.  HENT: Normocephalic, Atraumatic, Bilateral external ears normal. Dried blood in the right nare, no blood in the left nare  Eyes: Pupils are equal and reactive. Conjunctiva normal, non-icteric.   Heart: Regular rate and rythm, no murmurs.    Lungs: Clear to auscultation bilaterally.  Skin: Warm, Dry, No erythema, No rash.   Neurologic: Alert, Grossly non-focal.   Psychiatric: Affect normal, Judgment normal, Mood normal, Appears appropriate and not intoxicated.     COURSE & MEDICAL DECISION MAKING  Pertinent Labs & Imaging studies reviewed. (See chart for details)    This is a 81 y.o. male that presents with a nosebleed that is now stopped.  I will spray some Afrin up his nose to prevent it from starting and I educated the patient on how to manage nosebleeds at home.  He was  given strict return precautions and follow-up..     7:15 PM - Patient seen and examined at bedside.  Patient will be medicated with Afrin nasal spray 2 for his symptoms. I discussed plan for discharge and follow up as outlined below. The patient verbalizes they feel comfortable going home. The patient is stable for discharge at this time and will return for any new or worsening symptoms. Patient verbalizes understanding and support with my plan for discharge.     The patient is referred to a primary physician for blood pressure management, diabetic screening, and for all other preventative health concerns.    DISPOSITION:  Patient will be discharged home in stable condition.    FOLLOW UP:  [address] [address] [phone]  Go in 2 days    FINAL IMPRESSION  1. Epistaxis       Mynor COLLINS (Norbertibana), am scribing for, and in the presence of, Richard Luciano M.D..    Electronically signed by: Mynor Calderon (Cayden), 8/27/2021    Richard COLLINS M.D. personally performed the services described in this documentation, as scribed by Mynor Calderon in my presence, and it is both accurate and complete. E.    The note accurately reflects work and decisions made by me.  Richard Luciano M.D.  8/27/2021  11:35 PM

## 2021-08-28 NOTE — ED TRIAGE NOTES
Oleg Cook  81 y.o. male  Chief Complaint   Patient presents with   • Nose Bleed     BIBA for nose bleed; 2hrs been bleeding, on eliquis. Nose has been packed twice.       Vitals:    08/27/21 1911   BP: (!) 167/91   Pulse: 78   Resp: 18   Temp: 37 °C (98.6 °F)   SpO2: 98%        Patient BIBA for nose bleed - per EMS nose bleed has been going on for 2 hrs. Pt is on eliquis. Nose has been packed for the 2nd time PTA. Nose shows no signs of stopping on arrival to ED.    Patient is in stable condition at time of triage.    Of note, this RN and patient are in proper PPE and has a mask in place at all times during this encounter.     Past Medical History:   Diagnosis Date   • DVT (deep venous thrombosis)    • Essential hypertension, malignant    • Mixed hyperlipidemia    • Nephritis and nephropathy, not specified as acute or chronic, with other specified pathological lesion in kidney, in diseases classified elsewhere    • PE (pulmonary embolism)    • Type II or unspecified type diabetes mellitus with renal manifestations, not stated as uncontrolled    • Type II or unspecified type diabetes mellitus without mention of complication, not stated as uncontrolled    • Unspecified asthma    • Unspecified vitamin D deficiency 4/26/2012

## 2021-08-28 NOTE — ED NOTES
Patient has been updated of results and has been discharged home in stable condition by ERP.    Discharge paperwork has been provided to patient and gone over with patient by this nurse. Patient was given the opportunity to voice any questions or concerns and has verbalized understanding of discharge instructions with no questions or concerns.    Patient is A/Ox4 and vital signs are stable on discharge.    Discharge instructions/paperwork as well as all personal belongings are in possession of patient on discharge, no belongings were left behind in room.     Patient is ambulatory out to [facility] at this time where ride awaits.

## 2022-01-20 ENCOUNTER — HOSPITAL ENCOUNTER (INPATIENT)
Facility: MEDICAL CENTER | Age: 82
LOS: 1 days | DRG: 291 | End: 2022-01-22
Attending: EMERGENCY MEDICINE | Admitting: STUDENT IN AN ORGANIZED HEALTH CARE EDUCATION/TRAINING PROGRAM
Payer: COMMERCIAL

## 2022-01-20 ENCOUNTER — APPOINTMENT (OUTPATIENT)
Dept: RADIOLOGY | Facility: MEDICAL CENTER | Age: 82
DRG: 291 | End: 2022-01-20
Attending: EMERGENCY MEDICINE
Payer: COMMERCIAL

## 2022-01-20 DIAGNOSIS — I50.9 ACUTE CONGESTIVE HEART FAILURE, UNSPECIFIED HEART FAILURE TYPE (HCC): ICD-10-CM

## 2022-01-20 DIAGNOSIS — I50.9 ACUTE ON CHRONIC CONGESTIVE HEART FAILURE, UNSPECIFIED HEART FAILURE TYPE (HCC): ICD-10-CM

## 2022-01-20 DIAGNOSIS — R09.02 HYPOXIA: ICD-10-CM

## 2022-01-20 DIAGNOSIS — J44.9 CHRONIC OBSTRUCTIVE PULMONARY DISEASE, UNSPECIFIED COPD TYPE (HCC): ICD-10-CM

## 2022-01-20 LAB
ANION GAP SERPL CALC-SCNC: 21 MMOL/L (ref 7–16)
ANISOCYTOSIS BLD QL SMEAR: ABNORMAL
BASOPHILS # BLD AUTO: 0.9 % (ref 0–1.8)
BASOPHILS # BLD: 0.16 K/UL (ref 0–0.12)
BLOOD CULTURE HOLD CXBCH: NORMAL
BUN SERPL-MCNC: 15 MG/DL (ref 8–22)
BURR CELLS BLD QL SMEAR: NORMAL
CALCIUM SERPL-MCNC: 8.4 MG/DL (ref 8.5–10.5)
CHLORIDE SERPL-SCNC: 103 MMOL/L (ref 96–112)
CO2 SERPL-SCNC: 15 MMOL/L (ref 20–33)
CREAT SERPL-MCNC: 1.19 MG/DL (ref 0.5–1.4)
EKG IMPRESSION: NORMAL
EOSINOPHIL # BLD AUTO: 0.16 K/UL (ref 0–0.51)
EOSINOPHIL NFR BLD: 0.9 % (ref 0–6.9)
ERYTHROCYTE [DISTWIDTH] IN BLOOD BY AUTOMATED COUNT: 56.1 FL (ref 35.9–50)
GLUCOSE SERPL-MCNC: 331 MG/DL (ref 65–99)
HCT VFR BLD AUTO: 46 % (ref 42–52)
HGB BLD-MCNC: 13.8 G/DL (ref 14–18)
LYMPHOCYTES # BLD AUTO: 4.6 K/UL (ref 1–4.8)
LYMPHOCYTES NFR BLD: 25.4 % (ref 22–41)
MANUAL DIFF BLD: NORMAL
MCH RBC QN AUTO: 25.7 PG (ref 27–33)
MCHC RBC AUTO-ENTMCNC: 30 G/DL (ref 33.7–35.3)
MCV RBC AUTO: 85.5 FL (ref 81.4–97.8)
MICROCYTES BLD QL SMEAR: ABNORMAL
MONOCYTES # BLD AUTO: 1.74 K/UL (ref 0–0.85)
MONOCYTES NFR BLD AUTO: 9.6 % (ref 0–13.4)
MORPHOLOGY BLD-IMP: NORMAL
NEUTROPHILS # BLD AUTO: 11.44 K/UL (ref 1.82–7.42)
NEUTROPHILS NFR BLD: 63.2 % (ref 44–72)
NRBC # BLD AUTO: 0 K/UL
NRBC BLD-RTO: 0 /100 WBC
NT-PROBNP SERPL IA-MCNC: 2178 PG/ML (ref 0–125)
OVALOCYTES BLD QL SMEAR: NORMAL
PLATELET # BLD AUTO: 310 K/UL (ref 164–446)
PLATELET BLD QL SMEAR: NORMAL
PMV BLD AUTO: 10.8 FL (ref 9–12.9)
POIKILOCYTOSIS BLD QL SMEAR: NORMAL
POTASSIUM SERPL-SCNC: 3.7 MMOL/L (ref 3.6–5.5)
RBC # BLD AUTO: 5.38 M/UL (ref 4.7–6.1)
RBC BLD AUTO: PRESENT
SCHISTOCYTES BLD QL SMEAR: NORMAL
SODIUM SERPL-SCNC: 139 MMOL/L (ref 135–145)
TROPONIN T SERPL-MCNC: 21 NG/L (ref 6–19)
WBC # BLD AUTO: 18.1 K/UL (ref 4.8–10.8)

## 2022-01-20 PROCEDURE — 99285 EMERGENCY DEPT VISIT HI MDM: CPT

## 2022-01-20 PROCEDURE — 96375 TX/PRO/DX INJ NEW DRUG ADDON: CPT

## 2022-01-20 PROCEDURE — 71045 X-RAY EXAM CHEST 1 VIEW: CPT

## 2022-01-20 PROCEDURE — 83880 ASSAY OF NATRIURETIC PEPTIDE: CPT

## 2022-01-20 PROCEDURE — 80048 BASIC METABOLIC PNL TOTAL CA: CPT

## 2022-01-20 PROCEDURE — 93005 ELECTROCARDIOGRAM TRACING: CPT | Performed by: EMERGENCY MEDICINE

## 2022-01-20 PROCEDURE — 96365 THER/PROPH/DIAG IV INF INIT: CPT

## 2022-01-20 PROCEDURE — 84484 ASSAY OF TROPONIN QUANT: CPT

## 2022-01-20 PROCEDURE — 85027 COMPLETE CBC AUTOMATED: CPT

## 2022-01-20 PROCEDURE — 84145 PROCALCITONIN (PCT): CPT

## 2022-01-20 PROCEDURE — 99281 EMR DPT VST MAYX REQ PHY/QHP: CPT | Performed by: INTERNAL MEDICINE

## 2022-01-20 PROCEDURE — 700111 HCHG RX REV CODE 636 W/ 250 OVERRIDE (IP): Performed by: EMERGENCY MEDICINE

## 2022-01-20 PROCEDURE — 85007 BL SMEAR W/DIFF WBC COUNT: CPT

## 2022-01-20 PROCEDURE — 94660 CPAP INITIATION&MGMT: CPT

## 2022-01-20 PROCEDURE — 36415 COLL VENOUS BLD VENIPUNCTURE: CPT

## 2022-01-20 RX ORDER — ISOSORBIDE MONONITRATE 60 MG/1
120 TABLET, EXTENDED RELEASE ORAL EVERY MORNING
COMMUNITY

## 2022-01-20 RX ORDER — AMLODIPINE BESYLATE 10 MG/1
10 TABLET ORAL DAILY
COMMUNITY

## 2022-01-20 RX ORDER — LOSARTAN POTASSIUM 100 MG/1
100 TABLET ORAL EVERY EVENING
COMMUNITY

## 2022-01-20 RX ORDER — FLUOROURACIL 50 MG/G
1 CREAM TOPICAL
Status: ON HOLD | COMMUNITY
Start: 2022-01-11 | End: 2024-02-21

## 2022-01-20 RX ORDER — NITROGLYCERIN 20 MG/100ML
0-200 INJECTION INTRAVENOUS CONTINUOUS
Status: DISCONTINUED | OUTPATIENT
Start: 2022-01-20 | End: 2022-01-21

## 2022-01-20 RX ORDER — ALOGLIPTIN 12.5 MG/1
12.5 TABLET, FILM COATED ORAL EVERY EVENING
COMMUNITY

## 2022-01-20 RX ORDER — FUROSEMIDE 10 MG/ML
40 INJECTION INTRAMUSCULAR; INTRAVENOUS ONCE
Status: COMPLETED | OUTPATIENT
Start: 2022-01-20 | End: 2022-01-20

## 2022-01-20 RX ADMIN — FUROSEMIDE 40 MG: 10 INJECTION, SOLUTION INTRAMUSCULAR; INTRAVENOUS at 22:07

## 2022-01-20 RX ADMIN — NITROGLYCERIN 200 MCG/MIN: 20 INJECTION INTRAVENOUS at 20:25

## 2022-01-20 ASSESSMENT — PULMONARY FUNCTION TESTS: EPAP_CMH2O: 8

## 2022-01-20 ASSESSMENT — FIBROSIS 4 INDEX: FIB4 SCORE: 1.58

## 2022-01-21 ENCOUNTER — APPOINTMENT (OUTPATIENT)
Dept: CARDIOLOGY | Facility: MEDICAL CENTER | Age: 82
DRG: 291 | End: 2022-01-21
Attending: STUDENT IN AN ORGANIZED HEALTH CARE EDUCATION/TRAINING PROGRAM
Payer: COMMERCIAL

## 2022-01-21 ENCOUNTER — PATIENT OUTREACH (OUTPATIENT)
Dept: HEALTH INFORMATION MANAGEMENT | Facility: OTHER | Age: 82
End: 2022-01-21

## 2022-01-21 PROBLEM — J44.9 COPD (CHRONIC OBSTRUCTIVE PULMONARY DISEASE) (HCC): Status: ACTIVE | Noted: 2022-01-21

## 2022-01-21 PROBLEM — J96.90 RESPIRATORY FAILURE (HCC): Status: ACTIVE | Noted: 2022-01-21

## 2022-01-21 PROBLEM — J96.90 RESPIRATORY FAILURE (HCC): Status: RESOLVED | Noted: 2022-01-21 | Resolved: 2022-01-21

## 2022-01-21 PROBLEM — I50.9 ACUTE CONGESTIVE HEART FAILURE (HCC): Status: ACTIVE | Noted: 2022-01-21

## 2022-01-21 LAB
ALBUMIN SERPL BCP-MCNC: 4.1 G/DL (ref 3.2–4.9)
ALBUMIN/GLOB SERPL: 1.5 G/DL
ALP SERPL-CCNC: 111 U/L (ref 30–99)
ALT SERPL-CCNC: 15 U/L (ref 2–50)
ANION GAP SERPL CALC-SCNC: 15 MMOL/L (ref 7–16)
AST SERPL-CCNC: 11 U/L (ref 12–45)
BILIRUB SERPL-MCNC: 0.8 MG/DL (ref 0.1–1.5)
BUN SERPL-MCNC: 16 MG/DL (ref 8–22)
CALCIUM SERPL-MCNC: 9 MG/DL (ref 8.5–10.5)
CHLORIDE SERPL-SCNC: 103 MMOL/L (ref 96–112)
CO2 SERPL-SCNC: 23 MMOL/L (ref 20–33)
CREAT SERPL-MCNC: 1.17 MG/DL (ref 0.5–1.4)
ERYTHROCYTE [DISTWIDTH] IN BLOOD BY AUTOMATED COUNT: 54.4 FL (ref 35.9–50)
FLUAV RNA SPEC QL NAA+PROBE: NEGATIVE
FLUBV RNA SPEC QL NAA+PROBE: NEGATIVE
GLOBULIN SER CALC-MCNC: 2.8 G/DL (ref 1.9–3.5)
GLUCOSE BLD-MCNC: 118 MG/DL (ref 65–99)
GLUCOSE BLD-MCNC: 154 MG/DL (ref 65–99)
GLUCOSE BLD-MCNC: 166 MG/DL (ref 65–99)
GLUCOSE BLD-MCNC: 177 MG/DL (ref 65–99)
GLUCOSE SERPL-MCNC: 163 MG/DL (ref 65–99)
HCT VFR BLD AUTO: 39.9 % (ref 42–52)
HGB BLD-MCNC: 12.4 G/DL (ref 14–18)
LACTATE BLD-SCNC: 1.3 MMOL/L (ref 0.5–2)
LACTATE BLD-SCNC: 1.5 MMOL/L (ref 0.5–2)
LV EJECT FRACT  99904: 35
LV EJECT FRACT MOD 2C 99903: 32.71
LV EJECT FRACT MOD 4C 99902: 51.89
LV EJECT FRACT MOD BP 99901: 42.04
MCH RBC QN AUTO: 26.1 PG (ref 27–33)
MCHC RBC AUTO-ENTMCNC: 31.1 G/DL (ref 33.7–35.3)
MCV RBC AUTO: 84 FL (ref 81.4–97.8)
PLATELET # BLD AUTO: 228 K/UL (ref 164–446)
PMV BLD AUTO: 10.9 FL (ref 9–12.9)
POTASSIUM SERPL-SCNC: 3.4 MMOL/L (ref 3.6–5.5)
PROCALCITONIN SERPL-MCNC: <0.05 NG/ML
PROT SERPL-MCNC: 6.9 G/DL (ref 6–8.2)
RBC # BLD AUTO: 4.75 M/UL (ref 4.7–6.1)
RSV RNA SPEC QL NAA+PROBE: NEGATIVE
SARS-COV-2 RNA RESP QL NAA+PROBE: NOTDETECTED
SODIUM SERPL-SCNC: 141 MMOL/L (ref 135–145)
SPECIMEN SOURCE: NORMAL
WBC # BLD AUTO: 10.4 K/UL (ref 4.8–10.8)

## 2022-01-21 PROCEDURE — 700111 HCHG RX REV CODE 636 W/ 250 OVERRIDE (IP): Performed by: STUDENT IN AN ORGANIZED HEALTH CARE EDUCATION/TRAINING PROGRAM

## 2022-01-21 PROCEDURE — A9270 NON-COVERED ITEM OR SERVICE: HCPCS | Performed by: HOSPITALIST

## 2022-01-21 PROCEDURE — C9803 HOPD COVID-19 SPEC COLLECT: HCPCS | Performed by: HOSPITALIST

## 2022-01-21 PROCEDURE — 83605 ASSAY OF LACTIC ACID: CPT

## 2022-01-21 PROCEDURE — 700105 HCHG RX REV CODE 258: Performed by: STUDENT IN AN ORGANIZED HEALTH CARE EDUCATION/TRAINING PROGRAM

## 2022-01-21 PROCEDURE — 93306 TTE W/DOPPLER COMPLETE: CPT

## 2022-01-21 PROCEDURE — 36415 COLL VENOUS BLD VENIPUNCTURE: CPT

## 2022-01-21 PROCEDURE — 93306 TTE W/DOPPLER COMPLETE: CPT | Mod: 26 | Performed by: INTERNAL MEDICINE

## 2022-01-21 PROCEDURE — 0241U HCHG SARS-COV-2 COVID-19 NFCT DS RESP RNA 4 TRGT MIC: CPT

## 2022-01-21 PROCEDURE — 700101 HCHG RX REV CODE 250: Performed by: STUDENT IN AN ORGANIZED HEALTH CARE EDUCATION/TRAINING PROGRAM

## 2022-01-21 PROCEDURE — 99223 1ST HOSP IP/OBS HIGH 75: CPT | Mod: AI | Performed by: STUDENT IN AN ORGANIZED HEALTH CARE EDUCATION/TRAINING PROGRAM

## 2022-01-21 PROCEDURE — 700102 HCHG RX REV CODE 250 W/ 637 OVERRIDE(OP): Performed by: HOSPITALIST

## 2022-01-21 PROCEDURE — A9270 NON-COVERED ITEM OR SERVICE: HCPCS | Performed by: STUDENT IN AN ORGANIZED HEALTH CARE EDUCATION/TRAINING PROGRAM

## 2022-01-21 PROCEDURE — 94640 AIRWAY INHALATION TREATMENT: CPT

## 2022-01-21 PROCEDURE — 96367 TX/PROPH/DG ADDL SEQ IV INF: CPT

## 2022-01-21 PROCEDURE — 82962 GLUCOSE BLOOD TEST: CPT

## 2022-01-21 PROCEDURE — 96376 TX/PRO/DX INJ SAME DRUG ADON: CPT

## 2022-01-21 PROCEDURE — 85027 COMPLETE CBC AUTOMATED: CPT

## 2022-01-21 PROCEDURE — 770020 HCHG ROOM/CARE - TELE (206)

## 2022-01-21 PROCEDURE — 700101 HCHG RX REV CODE 250: Performed by: HOSPITALIST

## 2022-01-21 PROCEDURE — 96375 TX/PRO/DX INJ NEW DRUG ADDON: CPT

## 2022-01-21 PROCEDURE — 94669 MECHANICAL CHEST WALL OSCILL: CPT

## 2022-01-21 PROCEDURE — 80053 COMPREHEN METABOLIC PANEL: CPT

## 2022-01-21 PROCEDURE — 700102 HCHG RX REV CODE 250 W/ 637 OVERRIDE(OP): Performed by: STUDENT IN AN ORGANIZED HEALTH CARE EDUCATION/TRAINING PROGRAM

## 2022-01-21 PROCEDURE — 700111 HCHG RX REV CODE 636 W/ 250 OVERRIDE (IP): Performed by: HOSPITALIST

## 2022-01-21 RX ORDER — FUROSEMIDE 10 MG/ML
40 INJECTION INTRAMUSCULAR; INTRAVENOUS
Status: DISCONTINUED | OUTPATIENT
Start: 2022-01-21 | End: 2022-01-22 | Stop reason: HOSPADM

## 2022-01-21 RX ORDER — IPRATROPIUM BROMIDE AND ALBUTEROL SULFATE 2.5; .5 MG/3ML; MG/3ML
3 SOLUTION RESPIRATORY (INHALATION)
Status: DISCONTINUED | OUTPATIENT
Start: 2022-01-21 | End: 2022-01-21

## 2022-01-21 RX ORDER — ALBUTEROL SULFATE 90 UG/1
2 AEROSOL, METERED RESPIRATORY (INHALATION) EVERY 4 HOURS PRN
Status: ON HOLD | COMMUNITY
End: 2024-02-21

## 2022-01-21 RX ORDER — LOSARTAN POTASSIUM 50 MG/1
100 TABLET ORAL EVERY EVENING
Status: DISCONTINUED | OUTPATIENT
Start: 2022-01-21 | End: 2022-01-22 | Stop reason: HOSPADM

## 2022-01-21 RX ORDER — CHOLECALCIFEROL (VITAMIN D3) 125 MCG
1000 CAPSULE ORAL DAILY
Status: ON HOLD | COMMUNITY
End: 2024-02-21

## 2022-01-21 RX ORDER — FINASTERIDE 5 MG/1
5 TABLET, FILM COATED ORAL DAILY
Status: DISCONTINUED | OUTPATIENT
Start: 2022-01-21 | End: 2022-01-22 | Stop reason: HOSPADM

## 2022-01-21 RX ORDER — PREDNISONE 20 MG/1
40 TABLET ORAL DAILY
Status: DISCONTINUED | OUTPATIENT
Start: 2022-01-21 | End: 2022-01-22 | Stop reason: HOSPADM

## 2022-01-21 RX ORDER — IPRATROPIUM BROMIDE AND ALBUTEROL SULFATE 2.5; .5 MG/3ML; MG/3ML
3 SOLUTION RESPIRATORY (INHALATION)
Status: DISCONTINUED | OUTPATIENT
Start: 2022-01-21 | End: 2022-01-22

## 2022-01-21 RX ORDER — FUROSEMIDE 10 MG/ML
40 INJECTION INTRAMUSCULAR; INTRAVENOUS ONCE
Status: COMPLETED | OUTPATIENT
Start: 2022-01-21 | End: 2022-01-21

## 2022-01-21 RX ORDER — POTASSIUM CHLORIDE 20 MEQ/1
20 TABLET, EXTENDED RELEASE ORAL ONCE
Status: COMPLETED | OUTPATIENT
Start: 2022-01-21 | End: 2022-01-21

## 2022-01-21 RX ORDER — IPRATROPIUM BROMIDE AND ALBUTEROL SULFATE 2.5; .5 MG/3ML; MG/3ML
3 SOLUTION RESPIRATORY (INHALATION)
Status: DISCONTINUED | OUTPATIENT
Start: 2022-01-21 | End: 2022-01-22 | Stop reason: HOSPADM

## 2022-01-21 RX ORDER — ISOSORBIDE MONONITRATE 30 MG/1
120 TABLET, EXTENDED RELEASE ORAL EVERY MORNING
Status: DISCONTINUED | OUTPATIENT
Start: 2022-01-21 | End: 2022-01-22 | Stop reason: HOSPADM

## 2022-01-21 RX ORDER — DEXTROSE MONOHYDRATE 25 G/50ML
50 INJECTION, SOLUTION INTRAVENOUS
Status: DISCONTINUED | OUTPATIENT
Start: 2022-01-21 | End: 2022-01-22 | Stop reason: HOSPADM

## 2022-01-21 RX ORDER — AMLODIPINE BESYLATE 10 MG/1
10 TABLET ORAL DAILY
Status: DISCONTINUED | OUTPATIENT
Start: 2022-01-21 | End: 2022-01-22 | Stop reason: HOSPADM

## 2022-01-21 RX ORDER — AZITHROMYCIN 500 MG/5ML
500 INJECTION, POWDER, LYOPHILIZED, FOR SOLUTION INTRAVENOUS EVERY 24 HOURS
Status: DISCONTINUED | OUTPATIENT
Start: 2022-01-21 | End: 2022-01-21

## 2022-01-21 RX ORDER — POTASSIUM CHLORIDE 20 MEQ/1
40 TABLET, EXTENDED RELEASE ORAL ONCE
Status: COMPLETED | OUTPATIENT
Start: 2022-01-21 | End: 2022-01-21

## 2022-01-21 RX ORDER — CARVEDILOL 12.5 MG/1
12.5 TABLET ORAL 2 TIMES DAILY
Status: DISCONTINUED | OUTPATIENT
Start: 2022-01-21 | End: 2022-01-22 | Stop reason: HOSPADM

## 2022-01-21 RX ORDER — ATORVASTATIN CALCIUM 40 MG/1
40 TABLET, FILM COATED ORAL NIGHTLY
Status: DISCONTINUED | OUTPATIENT
Start: 2022-01-21 | End: 2022-01-22 | Stop reason: HOSPADM

## 2022-01-21 RX ORDER — TAMSULOSIN HYDROCHLORIDE 0.4 MG/1
0.4 CAPSULE ORAL
Status: ON HOLD | COMMUNITY
End: 2024-02-21

## 2022-01-21 RX ADMIN — CARVEDILOL 12.5 MG: 12.5 TABLET, FILM COATED ORAL at 17:29

## 2022-01-21 RX ADMIN — FUROSEMIDE 40 MG: 10 INJECTION, SOLUTION INTRAMUSCULAR; INTRAVENOUS at 15:00

## 2022-01-21 RX ADMIN — FUROSEMIDE 40 MG: 10 INJECTION, SOLUTION INTRAMUSCULAR; INTRAVENOUS at 05:48

## 2022-01-21 RX ADMIN — APIXABAN 5 MG: 5 TABLET, FILM COATED ORAL at 13:03

## 2022-01-21 RX ADMIN — IPRATROPIUM BROMIDE AND ALBUTEROL SULFATE 3 ML: .5; 2.5 SOLUTION RESPIRATORY (INHALATION) at 15:49

## 2022-01-21 RX ADMIN — AMLODIPINE BESYLATE 10 MG: 10 TABLET ORAL at 05:48

## 2022-01-21 RX ADMIN — FINASTERIDE 5 MG: 5 TABLET, FILM COATED ORAL at 05:49

## 2022-01-21 RX ADMIN — PREDNISONE 40 MG: 20 TABLET ORAL at 01:30

## 2022-01-21 RX ADMIN — INSULIN HUMAN 3 UNITS: 100 INJECTION, SOLUTION PARENTERAL at 07:33

## 2022-01-21 RX ADMIN — LOSARTAN POTASSIUM 100 MG: 50 TABLET, FILM COATED ORAL at 17:29

## 2022-01-21 RX ADMIN — INSULIN HUMAN 3 UNITS: 100 INJECTION, SOLUTION PARENTERAL at 13:04

## 2022-01-21 RX ADMIN — POTASSIUM CHLORIDE 40 MEQ: 1500 TABLET, EXTENDED RELEASE ORAL at 14:57

## 2022-01-21 RX ADMIN — INSULIN HUMAN 3 UNITS: 100 INJECTION, SOLUTION PARENTERAL at 17:32

## 2022-01-21 RX ADMIN — CARVEDILOL 12.5 MG: 12.5 TABLET, FILM COATED ORAL at 05:49

## 2022-01-21 RX ADMIN — ISOSORBIDE MONONITRATE 120 MG: 30 TABLET, EXTENDED RELEASE ORAL at 07:29

## 2022-01-21 RX ADMIN — IPRATROPIUM BROMIDE AND ALBUTEROL SULFATE 3 ML: .5; 2.5 SOLUTION RESPIRATORY (INHALATION) at 11:39

## 2022-01-21 RX ADMIN — CEFTRIAXONE SODIUM 2 G: 10 INJECTION, POWDER, FOR SOLUTION INTRAVENOUS at 02:53

## 2022-01-21 RX ADMIN — ATORVASTATIN CALCIUM 40 MG: 40 TABLET, FILM COATED ORAL at 21:18

## 2022-01-21 RX ADMIN — AZITHROMYCIN MONOHYDRATE 500 MG: 500 INJECTION, POWDER, LYOPHILIZED, FOR SOLUTION INTRAVENOUS at 03:24

## 2022-01-21 RX ADMIN — POTASSIUM CHLORIDE 20 MEQ: 1500 TABLET, EXTENDED RELEASE ORAL at 17:29

## 2022-01-21 RX ADMIN — FUROSEMIDE 40 MG: 10 INJECTION, SOLUTION INTRAMUSCULAR; INTRAVENOUS at 09:37

## 2022-01-21 RX ADMIN — APIXABAN 5 MG: 5 TABLET, FILM COATED ORAL at 01:47

## 2022-01-21 RX ADMIN — ASPIRIN 81 MG: 81 TABLET, COATED ORAL at 05:48

## 2022-01-21 RX ADMIN — ATORVASTATIN CALCIUM 40 MG: 40 TABLET, FILM COATED ORAL at 01:30

## 2022-01-21 ASSESSMENT — COGNITIVE AND FUNCTIONAL STATUS - GENERAL
TURNING FROM BACK TO SIDE WHILE IN FLAT BAD: A LITTLE
DAILY ACTIVITIY SCORE: 18
MOVING TO AND FROM BED TO CHAIR: A LITTLE
DRESSING REGULAR LOWER BODY CLOTHING: A LITTLE
CLIMB 3 TO 5 STEPS WITH RAILING: A LITTLE
MOBILITY SCORE: 18
EATING MEALS: A LITTLE
HELP NEEDED FOR BATHING: A LITTLE
STANDING UP FROM CHAIR USING ARMS: A LITTLE
SUGGESTED CMS G CODE MODIFIER DAILY ACTIVITY: CK
SUGGESTED CMS G CODE MODIFIER MOBILITY: CK
MOVING FROM LYING ON BACK TO SITTING ON SIDE OF FLAT BED: A LITTLE
WALKING IN HOSPITAL ROOM: A LITTLE
TOILETING: A LITTLE
DRESSING REGULAR UPPER BODY CLOTHING: A LITTLE
PERSONAL GROOMING: A LITTLE

## 2022-01-21 ASSESSMENT — LIFESTYLE VARIABLES
DOES PATIENT WANT TO STOP DRINKING: CANNOT ASSESS
HAVE YOU EVER FELT YOU SHOULD CUT DOWN ON YOUR DRINKING: NO
TOTAL SCORE: 0
TOTAL SCORE: 0
AVERAGE NUMBER OF DAYS PER WEEK YOU HAVE A DRINK CONTAINING ALCOHOL: 0
TOTAL SCORE: 0
ALCOHOL_USE: NO
ON A TYPICAL DAY WHEN YOU DRINK ALCOHOL HOW MANY DRINKS DO YOU HAVE: 0
EVER FELT BAD OR GUILTY ABOUT YOUR DRINKING: NO
EVER HAD A DRINK FIRST THING IN THE MORNING TO STEADY YOUR NERVES TO GET RID OF A HANGOVER: NO
HOW MANY TIMES IN THE PAST YEAR HAVE YOU HAD 5 OR MORE DRINKS IN A DAY: 0
CONSUMPTION TOTAL: NEGATIVE
HAVE PEOPLE ANNOYED YOU BY CRITICIZING YOUR DRINKING: NO

## 2022-01-21 ASSESSMENT — ENCOUNTER SYMPTOMS
DOUBLE VISION: 0
DIZZINESS: 0
NECK PAIN: 0
SHORTNESS OF BREATH: 1
HEMOPTYSIS: 0
PALPITATIONS: 0
BRUISES/BLEEDS EASILY: 0
BLURRED VISION: 0
COUGH: 0
MYALGIAS: 0
HEARTBURN: 0
DEPRESSION: 0
WHEEZING: 1
HEADACHES: 0
FEVER: 0
NAUSEA: 0
CHILLS: 0

## 2022-01-21 ASSESSMENT — COPD QUESTIONNAIRES
HAVE YOU SMOKED AT LEAST 100 CIGARETTES IN YOUR ENTIRE LIFE: YES
DURING THE PAST 4 WEEKS HOW MUCH DID YOU FEEL SHORT OF BREATH: NONE/LITTLE OF THE TIME
COPD SCREENING SCORE: 4
DO YOU EVER COUGH UP ANY MUCUS OR PHLEGM?: NO/ONLY WITH OCCASIONAL COLDS OR INFECTIONS

## 2022-01-21 ASSESSMENT — PATIENT HEALTH QUESTIONNAIRE - PHQ9
2. FEELING DOWN, DEPRESSED, IRRITABLE, OR HOPELESS: NOT AT ALL
1. LITTLE INTEREST OR PLEASURE IN DOING THINGS: NOT AT ALL
SUM OF ALL RESPONSES TO PHQ9 QUESTIONS 1 AND 2: 0

## 2022-01-21 ASSESSMENT — CHA2DS2 SCORE
HYPERTENSION: YES
SEX: MALE
CHF OR LEFT VENTRICULAR DYSFUNCTION: NO
DIABETES: YES
VASCULAR DISEASE: NO
AGE 65 TO 74: NO
PRIOR STROKE OR TIA OR THROMBOEMBOLISM: NO
CHA2DS2 VASC SCORE: 4
AGE 75 OR GREATER: YES

## 2022-01-21 ASSESSMENT — FIBROSIS 4 INDEX
FIB4 SCORE: 0.98
FIB4 SCORE: 1.08

## 2022-01-21 NOTE — ED NOTES
Pt transported to the floor via gurney by ACLS RN on 6L oxymask. Pt alert and oriented at this time. Pt belongings and paper work sent with patient.

## 2022-01-21 NOTE — ASSESSMENT & PLAN NOTE
Telemetry monitoring  Diuresis with IV Lasix  Coreg 12.5 mg twice daily in a.m.  Losartan 100 mg nightly  Imdur 120 mg daily  Echocardiogram transthoracic to evaluate for LVF and WMA

## 2022-01-21 NOTE — DISCHARGE PLANNING
Care Transition Team Discharge Planning    Anticipated Discharge Disposition: TBD    Action: Lsw completed chart review, and pt was admitted yesterday. Pt is not medically stable.     Barriers to Discharge: Medical clearance    Plan: HCM will continue to follow, and assist w/ d/c planning.

## 2022-01-21 NOTE — CONSULTS
Consults   81-year-old male with a history of atrial fibrillation, prior pulmonary embolism, hyperlipidemia, hypertension, aortic regurgitation, chronic hypoxic respiratory failure possibly related to COPD, history of right pleural effusion who presented with hypoxic respiratory failure and hypertension.    Initially he was placed on nitroglycerin and BiPAP and given a dose of Lasix however he quickly improved-by the time I evaluated the patient he was breathing easily, speaking in sentences with SPO2 in the 90s on nasal cannula oxygen with a normal blood pressure off nitroglycerin.  He states that his oxygen typically is in the high 80s at rest and will dip to the 60s with movement at home; he reports using 4 L nasal cannula at night.  He denies any infectious signs or symptoms or exposures, he states the last 4 times this is happened he suddenly becomes acutely short of breath and comes to the emergency department.    My evaluation demonstrates an ill-appearing male in no acute distress, regular rate and rhythm, normal distal pulses, no pitting edema, bilateral crackles, abdomen soft nontender.    Acute on chronic hypoxic respiratory failure  Likely multifactorial: Aortic regurgitation in the setting of hypertension, COPD, volume overload  Consider empiric CAP coverage  Recommend COVID-19 test  Formal echo  DuoNebs every 4 hours  Consider steroids given improvement on prior admissions  Diuresis    As he is no longer requiring a nitroglycerin infusion or BiPAP he is stable for telemetry/stepdown.    Critical care consult will be available upon request.    Sd Andino M.D.

## 2022-01-21 NOTE — ASSESSMENT & PLAN NOTE
Resume home medications  -Amlodipine 10 mg daily  -Coreg 12.5 mg twice daily  -Losartan 100 mg nightly

## 2022-01-21 NOTE — ED TRIAGE NOTES
"Chief Complaint   Patient presents with   • Shortness of Breath     BIB [facility] on CPAP. Pt was SOB at home with o2 saturation of 58% which improved to 87% on EMS CPAP.  ERP, RT at bedside. Pt placed on BIPAP by RT. Nitro drip started at 200mcg/min.     BP (!) 193/91   Pulse (!) 102   Temp 36.9 °C (98.5 °F) (Temporal)   Resp (!) 28   Ht 1.778 m (5' 10\")   Wt 87.1 kg (192 lb)   SpO2 97%   BMI 27.55 kg/m²     "

## 2022-01-21 NOTE — ASSESSMENT & PLAN NOTE
Has history of COPD on home oxygen he uses 4 L at home.  Continue oxygen supplementation obtain SPO2 above 90%  Prednisone 40 mg for 5 days  Diuresis with IV Lasix 40 mg twice daily  Bronchodilators as needed  Empiric Rocephin/Zithromax  Echocardiogram transthoracic to evaluate for LVF and wall motion abnormalities

## 2022-01-21 NOTE — ASSESSMENT & PLAN NOTE
Patient presents with cute onset of shortness of breath.  Chest x-ray showing findings of pulmonary edema.  Initially placed on BiPAP and nitroglycerin, gtt. with rapid improvement.  Was also diuresed with IV Lasix 40 mg.  His dyspnea may be exacerbated with COPD.  We will start on prednisone 40 mg daily for total of 5 days critical care was consulted and given the fact that patient was able to be weaned off BiPAP and nitroglycerin, gtt. patient will be admitted to stepdown.  Continue with telemetry monitoring  Diuresis with IV Lasix  Afterload reduction with losartan 100 mg nightly  Continue with Imdur 120 mg daily  May start Coreg 12.5 mg in a.m.  Cardiac, carb consistent 1 L fluid restricted diet  Monitor intake/output  Echocardiogram transthoracic to evaluate for LVF and wall motion abnormalities  Given his COPD, we will start him on prednisone 40 mg daily for total of 5 doses.  Empiric Rocephin/Zithromax for community-acquired pneumonia given elevated white count.  Bronchodilators as needed  Continue with oxygen supplementation, uses 4 L of oxygen at home

## 2022-01-21 NOTE — ED PROVIDER NOTES
ER Provider Note     Scribed for Richard Luciano M.D. by Mynor Calderon. 2022, 8:39 PM.    Primary Care Provider: No primary care provider noted.  Means of Arrival: EMS   History obtained from: Patient  History limited by: Acuity    CHIEF COMPLAINT  Chief Complaint   Patient presents with    Shortness of Breath     HPI  Oleg Cook is a 81 y.o. male with a history of CHF who presents to the Emergency Department via EMS for . The patient was at home and was picked up by EMS and placed on CPAP due to severe respiratory distress. He reports associated cough. The CPAP raised patient O2 saturation and somewhat alleviated his symptoms. No exacerbating factors were identified. The patient was put on BIPAP on arrival.  HPI limited secondary to acuity    REVIEW OF SYSTEMS  See HPI for further details.  ROS limited by acuity    PAST MEDICAL HISTORY   has a past medical history of DVT (deep venous thrombosis), Essential hypertension, malignant, Mixed hyperlipidemia, Nephritis and nephropathy, not specified as acute or chronic, with other specified pathological lesion in kidney, in diseases classified elsewhere, PE (pulmonary embolism), Type II or unspecified type diabetes mellitus with renal manifestations, not stated as uncontrolled, Type II or unspecified type diabetes mellitus without mention of complication, not stated as uncontrolled, Unspecified asthma, and Unspecified vitamin D deficiency (2012).    SURGICAL HISTORY  patient denies any surgical history    SOCIAL HISTORY  Social History     Tobacco Use    Smoking status: Former Smoker     Packs/day: 1.00     Years: 20.00     Pack years: 20.00     Types: Cigarettes     Quit date: 1974     Years since quittin.2    Smokeless tobacco: Never Used   Substance Use Topics    Alcohol use: Yes     Alcohol/week: 0.5 oz     Types: 1 drink(s) per week    Drug use: No      Social History     Substance and Sexual Activity   Drug Use No     FAMILY HISTORY  Family  "History   Problem Relation Age of Onset    Stroke Mother         brain anuerysm hemorrhage    Heart Disease Father         MI    Hypertension Father     Cancer Brother         Prostate CA     CURRENT MEDICATIONS  Home Medications       Reviewed by Darline Sandhu (Pharmacy Tech) on 01/20/22 at 2310  Med List Status: Complete     Medication Last Dose Status   Alogliptin Benzoate 12.5 MG Tab 1/19/2022 Active   amLODIPine (NORVASC) 10 MG Tab 1/20/2022 Active   apixaban (ELIQUIS) 5mg Tab 1/20/2022 Active   aspirin EC (ECOTRIN) 81 MG Tablet Delayed Response 1/20/2022 Active   atorvastatin (LIPITOR) 40 MG Tab 1/19/2022 Active   carvedilol (COREG) 12.5 MG Tab 1/20/2022 Active   Empagliflozin 25 MG Tab 1/20/2022 Active   finasteride (PROSCAR) 5 MG Tab 1/20/2022 Active   fluorouracil (EFUDEX) 5 % cream 1/18/2022 Active   furosemide (LASIX) 20 MG Tab 1/20/2022 Active   isosorbide mononitrate SR (IMDUR) 60 MG TABLET SR 24 HR 1/20/2022 Active   losartan (COZAAR) 100 MG Tab 1/19/2022 Active   metformin (GLUCOPHAGE) 1000 MG tablet 1/20/2022 Active   omeprazole (PRILOSEC) 40 MG delayed-release capsule 1/20/2022 Active                  ALLERGIES  No Known Allergies    PHYSICAL EXAM  VITAL SIGNS: BP (!) 193/91   Pulse 93   Temp 36.9 °C (98.5 °F) (Temporal)   Resp (!) 38   Ht 1.778 m (5' 10\")   Wt 87.1 kg (192 lb)   SpO2 93%   BMI 27.55 kg/m²    Constitutional: Alert. Severe respiratory distress.  HENT: No signs of trauma, Bilateral external ears normal, Nose normal.   Eyes: Pupils are equal and reactive, Conjunctiva normal, Non-icteric.   Neck: Normal range of motion, No tenderness, Supple, No stridor.   Lymphatic: No lymphadenopathy noted.   Cardiovascular: Regular rate and rhythm, no palpable thrill  Thorax & Lungs: Severe respiratory distress, Diminished lung sounds in bases and mid lung, Rales, No chest tenderness.  CTAB  Abdomen: Bowel sounds normal, Soft, No tenderness, No masses, No pulsatile masses. No peritoneal " signs.  Skin: Warm, Dry, No erythema, No rash.   Back: No bony tenderness, No CVA tenderness.   Extremities: Intact distal pulses, 1+ edema in bilateral legs, No tenderness, No cyanosis.  Musculoskeletal: Good range of motion in all major joints. No tenderness to palpation or major deformities noted.   Neurologic: Alert , Normal motor function, Normal sensory function, No focal deficits noted.  Psychiatric: Affect normal, Judgment normal, Mood normal.    DIAGNOSTIC STUDIES / PROCEDURES    EKG Interpretation:  Interpreted by me  12 Lead EKG interpreted by me to show:  Normal sinus rhythm  Rate 107  Axis: Normal  Intervals: Normal  Atrial fibrillation  LBBB seen on prior  No st elevation or depression  No pathologic T-wave  My impression of this EKG: Does not indicate ischemia or arrhythmia at this time.     LABS  Labs Reviewed   CBC WITH DIFFERENTIAL - Abnormal; Notable for the following components:       Result Value    WBC 18.1 (*)     Hemoglobin 13.8 (*)     MCH 25.7 (*)     MCHC 30.0 (*)     RDW 56.1 (*)     Neutrophils (Absolute) 11.44 (*)     Monos (Absolute) 1.74 (*)     Baso (Absolute) 0.16 (*)     All other components within normal limits   BASIC METABOLIC PANEL - Abnormal; Notable for the following components:    Co2 15 (*)     Glucose 331 (*)     Calcium 8.4 (*)     Anion Gap 21.0 (*)     All other components within normal limits   TROPONIN - Abnormal; Notable for the following components:    Troponin T 21 (*)     All other components within normal limits   PROBRAIN NATRIURETIC PEPTIDE, NT - Abnormal; Notable for the following components:    NT-proBNP 2178 (*)     All other components within normal limits   ESTIMATED GFR - Abnormal; Notable for the following components:    GFR If Non  59 (*)     All other components within normal limits   DIFFERENTIAL MANUAL   PERIPHERAL SMEAR REVIEW   PLATELET ESTIMATE   MORPHOLOGY   BLOOD CULTURE,HOLD   PROCALCITONIN   LACTIC ACID   LACTIC ACID     All  labs reviewed by me.    RADIOLOGY  DX-CHEST-PORTABLE (1 VIEW)   Final Result      Cardiomegaly and findings suggesting CHF.      EC-ECHOCARDIOGRAM COMPLETE W/O CONT    (Results Pending)      The radiologist's interpretation of all radiological studies have been reviewed by me.    COURSE & MEDICAL DECISION MAKING  Pertinent Labs & Imaging studies reviewed. (See chart for details)    This is a 81 y.o. male that presents with severe respiratory distress.  I am concerned about significant pulmonary edema related to CHF versus pneumonia and myocardial ischemia.  We will treat aggressively with a nitro drip and then obtain the below labs and then reassess..     8:39 PM - Patient seen and examined at bedside. Ordered DX-chest, CBC w/ diff, BMP, Troponin, and proBNP NT, and EKG.    8:50 PM - Patient will be treated with nitroglycerin 50mg D5W.    9:39 PM - Paged intensivist. Patient will be treated with Lasix 40mg.    9:50 PM - I discussed the patient's case and the above findings with Dr. Andino (Hospitalist) who agreed to evaluate the patient for admission.    10:05 PM - I informed the patient of my plan to admit today given the patient's current presentation and diagnostic study results. Patient verbalizes understanding and support with my plan for admission.     The patient has a slightly elevated troponin and a significant elevated proBNP.  The patient is feeling significantly improved with the nitro.  After period of time the nitro was to be able to be removed.  There is a leukocytosis of eighteen.  There is cardiomegaly and findings suggestive of CHF on the chest x-ray.  The intensivist the patient feels he is safe for the floor.  We will admit the patient to the floor.    CRITICAL CARE  The very real possibilty of a deterioration of this patient's condition required the highest level of my preparedness for sudden, emergent intervention.  I provided critical care services, which included medication orders, frequent  reevaluations of the patient's condition and response to treatment, ordering and reviewing test results, and discussing the case with various consultants.  The critical care time associated with the care of the patient was 40 minutes. Review chart for interventions. This time is exclusive of any other billable procedures.     DISPOSITION:  Patient will be hospitalized by Dr. Andino in critical condition.    FINAL IMPRESSION  1. Acute on chronic congestive heart failure, unspecified heart failure type (HCC)    2. Hypoxia    3. Critical care 40 minutes    Mynor COLLINS (Norbertibana), am scribing for, and in the presence of, Richard Luciaon M.D..    Electronically signed by: Mynor Calderon (Norbertibana), 1/20/2022    Richard COLLINS M.D. personally performed the services described in this documentation, as scribed by Mynor Calderon in my presence, and it is both accurate and complete. C.    The note accurately reflects work and decisions made by me.  Richard Luciano M.D.  1/21/2022  1:02 AM

## 2022-01-21 NOTE — H&P
Hospital Medicine History & Physical Note    Date of Service  1/21/2022    Primary Care Physician  No primary care provider on file.    Consultants  none    Specialist Names: none     Code Status  Full Code    Chief Complaint  Chief Complaint   Patient presents with   • Shortness of Breath       History of Presenting Illness  Oleg Cook is a 81 y.o. male past medical history of diabetes mellitus, A. fib on Eliquis, hypertension, COPD on home oxygen 4 L who presented 1/20/2022 with chief complaint of shortness of breath.  Patient reports he is only able to walk 10 steps, shortness of breath exacerbated with laying flat and relieved with sitting up.  He denies any chest pain, nausea, vomiting.  In the ER, he was placed on BiPAP upon arrival and started on nitroglycerin drip for acute CHF.  Critical care was consulted however given patient's rapid improvement with IV Lasix and nitroglycerin, gtt. patient was able to be weaned off BiPAP. Patient will be admitted to [facility].     I discussed the plan of care with patient.    Review of Systems  Review of Systems   Constitutional: Negative for chills and fever.   HENT: Negative for hearing loss and tinnitus.    Eyes: Negative for blurred vision and double vision.   Respiratory: Positive for shortness of breath and wheezing. Negative for cough and hemoptysis.    Cardiovascular: Positive for leg swelling. Negative for chest pain and palpitations.   Gastrointestinal: Negative for heartburn and nausea.   Genitourinary: Negative for dysuria and urgency.   Musculoskeletal: Negative for myalgias and neck pain.   Skin: Negative for itching and rash.   Neurological: Negative for dizziness and headaches.   Endo/Heme/Allergies: Does not bruise/bleed easily.   Psychiatric/Behavioral: Negative for depression and suicidal ideas.       Past Medical History   has a past medical history of DVT (deep venous thrombosis), Essential hypertension, malignant, Mixed hyperlipidemia, Nephritis and  nephropathy, not specified as acute or chronic, with other specified pathological lesion in kidney, in diseases classified elsewhere, PE (pulmonary embolism), Type II or unspecified type diabetes mellitus with renal manifestations, not stated as uncontrolled, Type II or unspecified type diabetes mellitus without mention of complication, not stated as uncontrolled, Unspecified asthma, and Unspecified vitamin D deficiency (4/26/2012).    Surgical History  Reviewed and not pertinent     Family History  family history includes Cancer in his brother; Heart Disease in his father; Hypertension in his father; Stroke in his mother.   Family history reviewed with patient. There is no family history that is pertinent to the chief complaint.     Social History   reports that he quit smoking about 47 years ago. His smoking use included cigarettes. He has a 20.00 pack-year smoking history. He has never used smokeless tobacco. He reports current alcohol use of about 0.5 oz of alcohol per week. He reports that he does not use drugs.    Allergies  No Known Allergies    Medications  Prior to Admission Medications   Prescriptions Last Dose Informant Patient Reported? Taking?   Alogliptin Benzoate 12.5 MG Tab 1/19/2022 at 2300 Rx Bottle (For Med Information) Yes Yes   Sig: Take 12.5 mg by mouth every evening.   Empagliflozin 25 MG Tab 1/20/2022 at 1100 Rx Bottle (For Med Information) Yes No   Sig: Take 12.5 mg by mouth every morning. 1/2 tablet = 12.5 mg.   amLODIPine (NORVASC) 10 MG Tab 1/20/2022 at 1100 Rx Bottle (For Med Information) Yes Yes   Sig: Take 10 mg by mouth every day.   apixaban (ELIQUIS) 5mg Tab 1/20/2022 at 1100 Rx Bottle (For Med Information) Yes No   Sig: Take 5 mg by mouth 2 times a day.   aspirin EC (ECOTRIN) 81 MG Tablet Delayed Response 1/20/2022 at 1100 Patient Yes Yes   Sig: Take 81 mg by mouth every morning.   atorvastatin (LIPITOR) 40 MG Tab 1/19/2022 at 2300 Rx Bottle (For Med Information) Yes No   Sig: Take  40 mg by mouth every evening.   carvedilol (COREG) 12.5 MG Tab 1/20/2022 at 1100 Rx Bottle (For Med Information) Yes No   Sig: Take 12.5 mg by mouth 2 times a day.   finasteride (PROSCAR) 5 MG Tab 1/20/2022 at 1100 Rx Bottle (For Med Information) Yes No   Sig: Take 5 mg by mouth every day.   fluorouracil (EFUDEX) 5 % cream 1/18/2022 at PRN Patient Yes No   Sig: Apply 1 Application topically at bedtime as needed. Apply at bedtime to actinic keratoses. Wash off in the morning. Do not use for more than 2 weeks at a time.   furosemide (LASIX) 20 MG Tab 1/20/2022 at 1100 Rx Bottle (For Med Information) No No   Sig: Take 1 tablet by mouth every day.   isosorbide mononitrate SR (IMDUR) 60 MG TABLET SR 24 HR 1/20/2022 at 1100 Rx Bottle (For Med Information) Yes Yes   Sig: Take 120 mg by mouth every morning. 2 tablets = 120 mg.   losartan (COZAAR) 100 MG Tab 1/19/2022 at 2300 Rx Bottle (For Med Information) Yes Yes   Sig: Take 100 mg by mouth every evening.   metformin (GLUCOPHAGE) 1000 MG tablet 1/20/2022 at 1100 Rx Bottle (For Med Information) Yes No   Sig: Take 1,000 mg by mouth 2 times a day.   omeprazole (PRILOSEC) 40 MG delayed-release 1 capsule 1/20/2022 at 1100 Rx Bottle (For Med Information) Yes No   Sig: Take 40 mg by mouth 2 times daily, before breakfast and dinner.      Facility-Administered Medications: None       Physical Exam  Temp:  [36.9 °C (98.5 °F)] 36.9 °C (98.5 °F)  Pulse:  [] 88  Resp:  [20-38] 20  BP: (104-193)/(62-92) 140/87  SpO2:  [90 %-98 %] 94 %  Blood Pressure : 140/87   Temperature: 36.9 °C (98.5 °F)   Pulse: 88   Respiration: 20   Pulse Oximetry: 94 %       Physical Exam  Vitals and nursing note reviewed.   Constitutional:       General: He is not in acute distress.     Appearance: Normal appearance. He is not ill-appearing.   HENT:      Head: Normocephalic and atraumatic.      Right Ear: Tympanic membrane normal.      Left Ear: Tympanic membrane normal.      Nose: Nose normal.       Mouth/Throat:      Mouth: Mucous membranes are moist.      Pharynx: Oropharynx is clear.   Eyes:      Extraocular Movements: Extraocular movements intact.      Pupils: Pupils are equal, round, and reactive to light.   Cardiovascular:      Pulses: Normal pulses.      Heart sounds: Normal heart sounds.      Comments: irregular rhythm   Pulmonary:      Effort: Pulmonary effort is normal.      Breath sounds: No stridor. Wheezing and rales present.   Abdominal:      General: Bowel sounds are normal. There is no distension.      Palpations: Abdomen is soft. There is no mass.      Tenderness: There is no abdominal tenderness.      Hernia: No hernia is present.   Musculoskeletal:         General: No swelling, tenderness, deformity or signs of injury. Normal range of motion.      Cervical back: Neck supple.      Right lower leg: Edema (trace) present.      Left lower leg: Edema (trace) present.   Skin:     General: Skin is warm.      Capillary Refill: Capillary refill takes less than 2 seconds.      Coloration: Skin is not jaundiced or pale.      Findings: No bruising or erythema.   Neurological:      General: No focal deficit present.      Mental Status: He is alert and oriented to person, place, and time. Mental status is at baseline.      Cranial Nerves: No cranial nerve deficit.      Sensory: No sensory deficit.      Motor: No weakness.   Psychiatric:         Mood and Affect: Mood normal.         Behavior: Behavior normal.         Laboratory:  Recent Labs     01/20/22 2024   WBC 18.1*   RBC 5.38   HEMOGLOBIN 13.8*   HEMATOCRIT 46.0   MCV 85.5   MCH 25.7*   MCHC 30.0*   RDW 56.1*   PLATELETCT 310   MPV 10.8     Recent Labs     01/20/22 2024   SODIUM 139   POTASSIUM 3.7   CHLORIDE 103   CO2 15*   GLUCOSE 331*   BUN 15   CREATININE 1.19   CALCIUM 8.4*     Recent Labs     01/20/22 2024   GLUCOSE 331*         Recent Labs     01/20/22 2024   NTPROBNP 2178*         Recent Labs     01/20/22 2024   TROPONINT 21*        Imaging:  DX-CHEST-PORTABLE (1 VIEW)   Final Result      Cardiomegaly and findings suggesting CHF.      EC-ECHOCARDIOGRAM COMPLETE W/O CONT    (Results Pending)       X-Ray:  I have personally reviewed the images and compared with prior images.    Assessment/Plan:  I anticipate this patient will require at least two midnights for appropriate medical management, necessitating inpatient admission.    * Acute on chronic respiratory failure with hypoxia (HCC)- (present on admission)  Assessment & Plan  Patient presents with cute onset of shortness of breath.  Chest x-ray showing findings of pulmonary edema.  Initially placed on BiPAP and nitroglycerin, gtt. with rapid improvement.  Was also diuresed with IV Lasix 40 mg.  His dyspnea may be exacerbated with COPD.  We will start on prednisone 40 mg daily for total of 5 days critical care was consulted and given the fact that patient was able to be weaned off BiPAP and nitroglycerin, gtt. patient will be admitted to stepdown.  Continue with telemetry monitoring  Diuresis with IV Lasix  Afterload reduction with losartan 100 mg nightly  Continue with Imdur 120 mg daily  May start Coreg 12.5 mg in a.m.  Cardiac, carb consistent 1 L fluid restricted diet  Monitor intake/output  Echocardiogram transthoracic to evaluate for LVF and wall motion abnormalities  Given his COPD, we will start him on prednisone 40 mg daily for total of 5 doses.  Empiric Rocephin/Zithromax for community-acquired pneumonia given elevated white count.  Bronchodilators as needed  Continue with oxygen supplementation, uses 4 L of oxygen at home      Acute congestive heart failure (HCC)  Assessment & Plan  Telemetry monitoring  Diuresis with IV Lasix  Coreg 12.5 mg twice daily in a.m.  Losartan 100 mg nightly  Imdur 120 mg daily  Echocardiogram transthoracic to evaluate for LVF and WMA    COPD (chronic obstructive pulmonary disease) (HCC)  Assessment & Plan  Has history of COPD on home oxygen he uses  4 L at home.  Continue oxygen supplementation obtain SPO2 above 90%  Prednisone 40 mg for 5 days  Diuresis with IV Lasix 40 mg twice daily  Bronchodilators as needed  Empiric Rocephin/Zithromax  Echocardiogram transthoracic to evaluate for LVF and wall motion abnormalities      BPH (benign prostatic hyperplasia)- (present on admission)  Assessment & Plan  Continue with Proscar 5 mg daily    CKD (chronic kidney disease) stage 3, GFR 30-59 ml/min ([facility])  Assessment & Plan  Renally adjust all medications  Avoid nephrotoxic agents  Monitor creatinine      A-fib ([facility])- (present on admission)  Assessment & Plan  Telemetry monitoring  Continue with Coreg 12.5 mg twice daily and Eliquis 5 mg twice daily    Mixed hyperlipidemia- (present on admission)  Assessment & Plan  Resume atorvastatin 40 mg nightly    Type 2 diabetes mellitus ([facility])- (present on admission)  Assessment & Plan  Insulin sliding scale with frequent Accu-Cheks and hypoglycemia protocol    Essential hypertension- (present on admission)  Assessment & Plan  Resume home medications  -Amlodipine 10 mg daily  -Coreg 12.5 mg twice daily  -Losartan 100 mg nightly      VTE prophylaxis: therapeutic anticoagulation with eliquis

## 2022-01-21 NOTE — PROGRESS NOTES
Received report and assumed pt care. Pt is alert and oriented x4. Pt on 8L oxymask, tolerating well. Pt has dyspnea with exertion.  VSS. No needs at this time.

## 2022-01-21 NOTE — CARE PLAN
The patient is Watcher - Medium risk of patient condition declining or worsening         Progress made toward(s) clinical / shift goals:    Problem: Care Map:  Admission Optimal Outcome for the Heart Failure Patient  Goal: Admission:  Optimal Care of the heart failure patient  Outcome: Progressing     Problem: Care Map:  Day 1 Optimal Outcome for the Heart Failure Patient  Goal: Day 1:  Optimal Care of the heart failure patient  Outcome: Progressing     Problem: Care Map:  Day 2 Optimal Outcome for the Heart Failure Patient  Goal: Day 2:  Optimal Care of the heart failure patient  Outcome: Progressing     Problem: Care Map:  Day 3 Optimal Outcome for the Heart Failure Patient  Goal: Day 3:  Optimal Care of the heart failure patient  Outcome: Progressing     Problem: Care Map:  Day Before Discharge Optimal Outcome for the Heart Failure Patient  Goal: Day Before Discharge:  Optimal Care of the heart failure patient  Outcome: Progressing     Problem: Care Map:  Day of Discharge Optimal Outcome for the Heart Failure Patient  Goal: Day of Discharge:  Optimal Care of the heart failure patient  Outcome: Progressing     Problem: Knowledge Deficit - Standard  Goal: Patient and family/care givers will demonstrate understanding of plan of care, disease process/condition, diagnostic tests and medications  Outcome: Progressing       Patient is not progressing towards the following goals:

## 2022-01-21 NOTE — PROGRESS NOTES
Patient seen and examined today.    Patient tolerating treatment and therapies.  All Data, Medication data reviewed.  Case discussed with nursing as available.  Plan of Care reviewed with patient and notified of changes.  1/21 the patient brought up from the ER earlier this morning, he feels overall improved, he is on 6 to 8 L nasal cannula oxygen,  No significant pulmonary wheezing currently  Mild rales, no lower extremity edema  Additional labs ordered, potassium replacement, additional Lasix    Full further follow-up note in a.m.  See orders

## 2022-01-21 NOTE — ED NOTES
Med rec completed per patient at bedside with medication bottles provided by patient; medication bottles reviewed and returned.  Allergies reviewed with patient. No known drug allergies.  No oral antibiotics in last 30 days.  Patient is on ELIQUIS, and also takes a low-dose aspirin daily.  Patient's pharmacy: [facility].

## 2022-01-21 NOTE — PROGRESS NOTES
Pt has a walk in appt for the [facility] on 1/28/22 at 9 am to seek follow up care for heart failure dx.

## 2022-01-21 NOTE — PROGRESS NOTES
4 Eyes Skin Assessment Completed by Nicola RN and LARON Pham.    Head Scab  Ears WDL  Nose WDL  Mouth WDL  Neck WDL  Breast/Chest WDL  Shoulder Blades WDL  Spine WDL  (R) Arm/Elbow/Hand WDL  (L) Arm/Elbow/Hand WDL  Abdomen WDL  Groin WDL  Scrotum/Coccyx/Buttocks WDL  (R) Leg WDL  (L) Leg WDL  (R) Heel/Foot/Toe WDL  (L) Heel/Foot/Toe WDL          Devices In Places ECG, Blood Pressure Cuff and Pulse Ox      Interventions In Place Sacral Mepilex and Pillows    Possible Skin Injury No    Pictures Uploaded Into Epic N/A  Wound Consult Placed N/A  RN Wound Prevention Protocol Ordered No  ey

## 2022-01-22 ENCOUNTER — PHARMACY VISIT (OUTPATIENT)
Dept: PHARMACY | Facility: MEDICAL CENTER | Age: 82
End: 2022-01-22
Payer: COMMERCIAL

## 2022-01-22 VITALS
BODY MASS INDEX: 27.62 KG/M2 | SYSTOLIC BLOOD PRESSURE: 130 MMHG | HEIGHT: 70 IN | HEART RATE: 64 BPM | WEIGHT: 192.9 LBS | TEMPERATURE: 98.7 F | RESPIRATION RATE: 16 BRPM | OXYGEN SATURATION: 97 % | DIASTOLIC BLOOD PRESSURE: 73 MMHG

## 2022-01-22 LAB
ANION GAP SERPL CALC-SCNC: 12 MMOL/L (ref 7–16)
BASOPHILS # BLD AUTO: 1 % (ref 0–1.8)
BASOPHILS # BLD: 0.1 K/UL (ref 0–0.12)
BUN SERPL-MCNC: 25 MG/DL (ref 8–22)
CALCIUM SERPL-MCNC: 8.8 MG/DL (ref 8.5–10.5)
CHLORIDE SERPL-SCNC: 105 MMOL/L (ref 96–112)
CO2 SERPL-SCNC: 23 MMOL/L (ref 20–33)
CREAT SERPL-MCNC: 1.18 MG/DL (ref 0.5–1.4)
EOSINOPHIL # BLD AUTO: 0.15 K/UL (ref 0–0.51)
EOSINOPHIL NFR BLD: 1.5 % (ref 0–6.9)
ERYTHROCYTE [DISTWIDTH] IN BLOOD BY AUTOMATED COUNT: 53.2 FL (ref 35.9–50)
GLUCOSE BLD-MCNC: 128 MG/DL (ref 65–99)
GLUCOSE BLD-MCNC: 136 MG/DL (ref 65–99)
GLUCOSE BLD-MCNC: 366 MG/DL (ref 65–99)
GLUCOSE SERPL-MCNC: 147 MG/DL (ref 65–99)
HCT VFR BLD AUTO: 37.1 % (ref 42–52)
HGB BLD-MCNC: 11.8 G/DL (ref 14–18)
IMM GRANULOCYTES # BLD AUTO: 0.06 K/UL (ref 0–0.11)
IMM GRANULOCYTES NFR BLD AUTO: 0.6 % (ref 0–0.9)
LYMPHOCYTES # BLD AUTO: 2.04 K/UL (ref 1–4.8)
LYMPHOCYTES NFR BLD: 20.8 % (ref 22–41)
MCH RBC QN AUTO: 26.5 PG (ref 27–33)
MCHC RBC AUTO-ENTMCNC: 31.8 G/DL (ref 33.7–35.3)
MCV RBC AUTO: 83.2 FL (ref 81.4–97.8)
MONOCYTES # BLD AUTO: 1.01 K/UL (ref 0–0.85)
MONOCYTES NFR BLD AUTO: 10.3 % (ref 0–13.4)
NEUTROPHILS # BLD AUTO: 6.44 K/UL (ref 1.82–7.42)
NEUTROPHILS NFR BLD: 65.8 % (ref 44–72)
NRBC # BLD AUTO: 0 K/UL
NRBC BLD-RTO: 0 /100 WBC
NT-PROBNP SERPL IA-MCNC: 4827 PG/ML (ref 0–125)
PLATELET # BLD AUTO: 197 K/UL (ref 164–446)
PMV BLD AUTO: 10.6 FL (ref 9–12.9)
POTASSIUM SERPL-SCNC: 3.3 MMOL/L (ref 3.6–5.5)
PROCALCITONIN SERPL-MCNC: 0.14 NG/ML
RBC # BLD AUTO: 4.46 M/UL (ref 4.7–6.1)
SODIUM SERPL-SCNC: 140 MMOL/L (ref 135–145)
WBC # BLD AUTO: 9.8 K/UL (ref 4.8–10.8)

## 2022-01-22 PROCEDURE — 700101 HCHG RX REV CODE 250: Performed by: HOSPITALIST

## 2022-01-22 PROCEDURE — RXMED WILLOW AMBULATORY MEDICATION CHARGE: Performed by: HOSPITALIST

## 2022-01-22 PROCEDURE — 36415 COLL VENOUS BLD VENIPUNCTURE: CPT

## 2022-01-22 PROCEDURE — A9270 NON-COVERED ITEM OR SERVICE: HCPCS | Performed by: STUDENT IN AN ORGANIZED HEALTH CARE EDUCATION/TRAINING PROGRAM

## 2022-01-22 PROCEDURE — 82962 GLUCOSE BLOOD TEST: CPT

## 2022-01-22 PROCEDURE — 80048 BASIC METABOLIC PNL TOTAL CA: CPT

## 2022-01-22 PROCEDURE — 94640 AIRWAY INHALATION TREATMENT: CPT

## 2022-01-22 PROCEDURE — 700111 HCHG RX REV CODE 636 W/ 250 OVERRIDE (IP): Performed by: STUDENT IN AN ORGANIZED HEALTH CARE EDUCATION/TRAINING PROGRAM

## 2022-01-22 PROCEDURE — 700102 HCHG RX REV CODE 250 W/ 637 OVERRIDE(OP): Performed by: STUDENT IN AN ORGANIZED HEALTH CARE EDUCATION/TRAINING PROGRAM

## 2022-01-22 PROCEDURE — 99239 HOSP IP/OBS DSCHRG MGMT >30: CPT | Performed by: HOSPITALIST

## 2022-01-22 PROCEDURE — 85025 COMPLETE CBC W/AUTO DIFF WBC: CPT

## 2022-01-22 PROCEDURE — A9270 NON-COVERED ITEM OR SERVICE: HCPCS | Performed by: HOSPITALIST

## 2022-01-22 PROCEDURE — 700102 HCHG RX REV CODE 250 W/ 637 OVERRIDE(OP): Performed by: HOSPITALIST

## 2022-01-22 PROCEDURE — 84145 PROCALCITONIN (PCT): CPT

## 2022-01-22 PROCEDURE — 83880 ASSAY OF NATRIURETIC PEPTIDE: CPT

## 2022-01-22 PROCEDURE — 94760 N-INVAS EAR/PLS OXIMETRY 1: CPT

## 2022-01-22 PROCEDURE — 99232 SBSQ HOSP IP/OBS MODERATE 35: CPT | Mod: GC | Performed by: INTERNAL MEDICINE

## 2022-01-22 RX ORDER — FUROSEMIDE 20 MG/1
40 TABLET ORAL DAILY
Qty: 60 TABLET | Refills: 3 | Status: SHIPPED | OUTPATIENT
Start: 2022-01-22 | End: 2022-01-22 | Stop reason: SDUPTHER

## 2022-01-22 RX ORDER — PREDNISONE 20 MG/1
40 TABLET ORAL DAILY
Qty: 10 TABLET | Refills: 0 | Status: SHIPPED | OUTPATIENT
Start: 2022-01-22 | End: 2022-01-27

## 2022-01-22 RX ORDER — AMOXICILLIN AND CLAVULANATE POTASSIUM 875; 125 MG/1; MG/1
1 TABLET, FILM COATED ORAL EVERY 12 HOURS
Status: DISCONTINUED | OUTPATIENT
Start: 2022-01-22 | End: 2022-01-22 | Stop reason: HOSPADM

## 2022-01-22 RX ORDER — POTASSIUM CHLORIDE 750 MG/1
40 TABLET, EXTENDED RELEASE ORAL DAILY
Qty: 360 EACH | Refills: 0 | Status: SHIPPED | OUTPATIENT
Start: 2022-01-22 | End: 2022-04-22

## 2022-01-22 RX ORDER — FUROSEMIDE 20 MG/1
40 TABLET ORAL DAILY
Qty: 60 TABLET | Refills: 3 | Status: SHIPPED | OUTPATIENT
Start: 2022-01-22

## 2022-01-22 RX ORDER — POTASSIUM CHLORIDE 20 MEQ/1
40 TABLET, EXTENDED RELEASE ORAL ONCE
Status: COMPLETED | OUTPATIENT
Start: 2022-01-22 | End: 2022-01-22

## 2022-01-22 RX ORDER — AMOXICILLIN AND CLAVULANATE POTASSIUM 875; 125 MG/1; MG/1
1 TABLET, FILM COATED ORAL EVERY 12 HOURS
Qty: 14 TABLET | Refills: 0 | Status: SHIPPED | OUTPATIENT
Start: 2022-01-22 | End: 2022-01-22 | Stop reason: SDUPTHER

## 2022-01-22 RX ORDER — IPRATROPIUM BROMIDE AND ALBUTEROL SULFATE 2.5; .5 MG/3ML; MG/3ML
3 SOLUTION RESPIRATORY (INHALATION)
Status: DISCONTINUED | OUTPATIENT
Start: 2022-01-22 | End: 2022-01-22 | Stop reason: HOSPADM

## 2022-01-22 RX ORDER — AMOXICILLIN AND CLAVULANATE POTASSIUM 875; 125 MG/1; MG/1
1 TABLET, FILM COATED ORAL EVERY 12 HOURS
Qty: 14 TABLET | Refills: 0 | Status: SHIPPED | OUTPATIENT
Start: 2022-01-22 | End: 2022-01-29

## 2022-01-22 RX ORDER — PREDNISONE 20 MG/1
40 TABLET ORAL DAILY
Qty: 10 TABLET | Refills: 0 | Status: SHIPPED | OUTPATIENT
Start: 2022-01-22 | End: 2022-01-22 | Stop reason: SDUPTHER

## 2022-01-22 RX ORDER — ALBUTEROL SULFATE 2.5 MG/3ML
2.5 SOLUTION RESPIRATORY (INHALATION) EVERY 4 HOURS PRN
Qty: 75 ML | Refills: 0 | Status: SHIPPED | OUTPATIENT
Start: 2022-01-22 | End: 2022-01-24

## 2022-01-22 RX ORDER — POTASSIUM CHLORIDE 750 MG/1
40 TABLET, EXTENDED RELEASE ORAL DAILY
Qty: 360 EACH | Refills: 0 | Status: SHIPPED | OUTPATIENT
Start: 2022-01-22 | End: 2022-01-22 | Stop reason: SDUPTHER

## 2022-01-22 RX ADMIN — IPRATROPIUM BROMIDE AND ALBUTEROL SULFATE 3 ML: .5; 2.5 SOLUTION RESPIRATORY (INHALATION) at 10:50

## 2022-01-22 RX ADMIN — ISOSORBIDE MONONITRATE 120 MG: 30 TABLET, EXTENDED RELEASE ORAL at 05:22

## 2022-01-22 RX ADMIN — APIXABAN 5 MG: 5 TABLET, FILM COATED ORAL at 02:00

## 2022-01-22 RX ADMIN — IPRATROPIUM BROMIDE AND ALBUTEROL SULFATE 3 ML: .5; 2.5 SOLUTION RESPIRATORY (INHALATION) at 04:20

## 2022-01-22 RX ADMIN — AMLODIPINE BESYLATE 10 MG: 10 TABLET ORAL at 05:22

## 2022-01-22 RX ADMIN — POTASSIUM CHLORIDE 40 MEQ: 1500 TABLET, EXTENDED RELEASE ORAL at 11:41

## 2022-01-22 RX ADMIN — FUROSEMIDE 40 MG: 10 INJECTION, SOLUTION INTRAMUSCULAR; INTRAVENOUS at 14:05

## 2022-01-22 RX ADMIN — AMOXICILLIN AND CLAVULANATE POTASSIUM 1 TABLET: 875; 125 TABLET, FILM COATED ORAL at 11:41

## 2022-01-22 RX ADMIN — FUROSEMIDE 40 MG: 10 INJECTION, SOLUTION INTRAMUSCULAR; INTRAVENOUS at 05:21

## 2022-01-22 RX ADMIN — FINASTERIDE 5 MG: 5 TABLET, FILM COATED ORAL at 05:21

## 2022-01-22 RX ADMIN — ASPIRIN 81 MG: 81 TABLET, COATED ORAL at 05:22

## 2022-01-22 RX ADMIN — CARVEDILOL 12.5 MG: 12.5 TABLET, FILM COATED ORAL at 05:21

## 2022-01-22 RX ADMIN — INSULIN HUMAN 15 UNITS: 100 INJECTION, SOLUTION PARENTERAL at 11:47

## 2022-01-22 RX ADMIN — APIXABAN 5 MG: 5 TABLET, FILM COATED ORAL at 14:05

## 2022-01-22 RX ADMIN — POTASSIUM CHLORIDE 40 MEQ: 1500 TABLET, EXTENDED RELEASE ORAL at 05:22

## 2022-01-22 RX ADMIN — PREDNISONE 40 MG: 20 TABLET ORAL at 05:21

## 2022-01-22 NOTE — CARE PLAN
The patient is Watcher - Medium risk of patient condition declining or worsening    Shift Goals  Clinical Goals: Monitor oxygenation status, monitor for changes to mentation  Patient Goals: rest/sleep comfortably, breathe easier  Family Goals: none present    Progress made toward(s) clinical / shift goals:  Progressing     Problem: Knowledge Deficit - Standard  Goal: Patient and family/care givers will demonstrate understanding of plan of care, disease process/condition, diagnostic tests and medications  Outcome: Progressing  Note: Pt updated on POC, no questions at this time      Problem: Pain - Standard  Goal: Alleviation of pain or a reduction in pain to the patient’s comfort goal  Outcome: Progressing  Note: Pt encouraged to voice feelings of pain, pt medicated per MAR.

## 2022-01-22 NOTE — CARE PLAN
The patient is Stable - Low risk of patient condition declining or worsening    Shift Goals  Clinical Goals: Monitor oxygenation status, monitor for changes to mentation  Patient Goals: rest/sleep comfortably, breathe easier  Family Goals: none present    Progress made toward(s) clinical / shift goals:  Pt whiteboard updated on plan of care. Pt encouraged to call for assistance. Pt encouraged to voice any concerns or questions regarding care plan. Pt updated on plan of care as it develops and changes. Fall precautions in place. Bed in lowest position. Non-skid socks in place. Personal possessions within reach. Mobility sign on door. Bed-alarm on. Call light within reach. Pt educated regarding fall prevention and states understanding. Pt will verbalize pain using 0-10 pain scale, when to ask for pain medications, and medication information.       Problem: Knowledge Deficit - Standard  Goal: Patient and family/care givers will demonstrate understanding of plan of care, disease process/condition, diagnostic tests and medications  Outcome: Progressing     Problem: Self Care  Goal: Patient will have the ability to perform ADLs independently or with assistance (bathe, groom, dress, toilet and feed)  Outcome: Progressing     Problem: Pain - Standard  Goal: Alleviation of pain or a reduction in pain to the patient’s comfort goal  Outcome: Progressing

## 2022-01-22 NOTE — FACE TO FACE
"Face to Face Note  -  Durable Medical Equipment    Maylin Wick M.D. - NPI: [id number]  I certify that this patient is under my care and that they had a durable medical equipment(DME)face to face encounter by myself that meets the physician DME face-to-face encounter requirements with this patient on:    Date of encounter:   Patient:                    MRN:                       YOB: 2020  Oleg Cook  [id number]  1940     The encounter with the patient was in whole, or in part, for the following medical condition, which is the primary reason for durable medical equipment:  COPD    I certify that, based on my findings, the following durable medical equipment is medically necessary:  Oxygen.    HOME O2 Saturation Measurements:(Values must be present for Home Oxygen orders)  Room air sat at rest: 91  Room air sat with amb: 88  With liters of O2: 3, O2 sat at rest with O2: 93  With Liters of O2: 3, O2 sat with amb with O2 : 94  Is the patient mobile?: Yes    My Clinical findings support the need for the above equipment due to:  Hypoxia    Supporting Symptoms: The patient requires supplemental oxygen, as the following interventions have been tried with limited or no improvement: \"Bronchodilators and/or steroid inhalers, \"Oral and/or IV steroids and \"Ambulation with oximetry    If patient feels more short of breath, they can go up to 6 liters per minute and contact healthcare provider.  "
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
## 2022-01-22 NOTE — PROGRESS NOTES
Pt transferred to [id number]. Report given to Leann LARRY. Belongings moved with patient, including phone, wallet, and medications. Daughter to come and  medications to take home shortly.

## 2022-01-22 NOTE — CARE PLAN
Problem: Self Care  Goal: Patient will have the ability to perform ADLs independently or with assistance (bathe, groom, dress, toilet and feed)  Outcome: Progressing  Pt stands, sits edge of bed, and eats without assistance      Problem: Pain - Standard  Goal: Alleviation of pain or a reduction in pain to the patient’s comfort goal  Outcome: Progressing  Pt declines having pain     The patient is Watcher - Medium risk of patient condition declining or worsening    Shift Goals  Clinical Goals: Monitor oxygenation status, monitor for changes to mentation  Patient Goals: rest/sleep comfortably, breathe easier  Family Goals: none present    Progress made toward(s) clinical / shift goals:  O2 6L    Patient is not progressing towards the following goals: Continued dyspnea

## 2022-01-22 NOTE — DISCHARGE INSTRUCTIONS
Discharge Instructions    Discharged to home by car with relative. Discharged via wheelchair, hospital escort: Yes.  Special equipment needed: Not Applicable    Be sure to schedule a follow-up appointment with your primary care doctor or any specialists as instructed.     Discharge Plan:   Diet Plan: Discussed  Activity Level: Discussed  Confirmed Follow up Appointment: Appointment Scheduled  Confirmed Symptoms Management: Discussed  Medication Reconciliation Updated: Yes  Influenza Vaccine Indication: Patient Refuses    I understand that a diet low in cholesterol, fat, and sodium is recommended for good health. Unless I have been given specific instructions below for another diet, I accept this instruction as my diet prescription.   Other diet: Diabetic, 2 gram sodium     Special Instructions: None    · Is patient discharged on Warfarin / Coumadin?   No     Depression / Suicide Risk    As you are discharged from this [facility] facility, it is important to learn how to keep safe from harming yourself.    Recognize the warning signs:  · Abrupt changes in personality, positive or negative- including increase in energy   · Giving away possessions  · Change in eating patterns- significant weight changes-  positive or negative  · Change in sleeping patterns- unable to sleep or sleeping all the time   · Unwillingness or inability to communicate  · Depression  · Unusual sadness, discouragement and loneliness  · Talk of wanting to die  · Neglect of personal appearance   · Rebelliousness- reckless behavior  · Withdrawal from people/activities they love  · Confusion- inability to concentrate     If you or a loved one observes any of these behaviors or has concerns about self-harm, here's what you can do:  · Talk about it- your feelings and reasons for harming yourself  · Remove any means that you might use to hurt yourself (examples: pills, rope, extension cords, firearm)  · Get professional help from the community  (Mental Health, Substance Abuse, psychological counseling)  · Do not be alone:Call your Safe Contact- someone whom you trust who will be there for you.  · Call your local CRISIS HOTLINE 313-3958 or 726-725-5782  · Call your local Children's Mobile Crisis Response Team [place] (905) 108-7338 or www.Simplilearn  · Call the toll free National Suicide Prevention Hotlines   · National Suicide Prevention Lifeline 212-751-BFYH (2736)  · National Hope Line Network 800-SUICIDE (971-9119)    Amoxicillin capsules or tablets  What is this medicine?  AMOXICILLIN (a mox i BRIDGET in) is a penicillin antibiotic. It is used to treat certain kinds of bacterial infections. It will not work for colds, flu, or other viral infections.  This medicine may be used for other purposes; ask your health care provider or pharmacist if you have questions.  COMMON BRAND NAME(S): Amoxil, Moxilin, Sumox, Trimox  What should I tell my health care provider before I take this medicine?  They need to know if you have any of these conditions:  kidney disease  an unusual or allergic reaction to amoxicillin, other penicillins, cephalosporin antibiotics, other medicines, foods, dyes, or preservatives  pregnant or trying to get pregnant  breast-feeding  How should I use this medicine?  Take this medicine by mouth with a glass of water. Follow the directions on your prescription label. You can take it with or without food. If it upsets your stomach, take it with food. Take your medicine at regular intervals. Do not take your medicine more often than directed. Take all of your medicine as directed even if you think you are better. Do not skip doses or stop your medicine early.  Talk to your pediatrician regarding the use of this medicine in children. While this drug may be prescribed for selected conditions, precautions do apply.  Overdosage: If you think you have taken too much of this medicine contact a poison control center or emergency room at  once.  NOTE: This medicine is only for you. Do not share this medicine with others.  What if I miss a dose?  If you miss a dose, take it as soon as you can. If it is almost time for your next dose, take only that dose. Do not take double or extra doses.  What may interact with this medicine?  allopurinol  birth control pills  certain antibiotics like chloramphenicol, erythromycin, sulfamethoxazole, tetracycline  certain medicines that treat or prevent blood clots like warfarin  This list may not describe all possible interactions. Give your health care provider a list of all the medicines, herbs, non-prescription drugs, or dietary supplements you use. Also tell them if you smoke, drink alcohol, or use illegal drugs. Some items may interact with your medicine.  What should I watch for while using this medicine?  Tell your health care professional if your symptoms do not start to get better or if they get worse.  Do not treat diarrhea with over the counter products. Contact your health care professional if you have diarrhea that lasts more than 2 days or if it is severe and watery.  If you have diabetes, you may get a false-positive result for sugar in your urine. Check with your health care professional.  Birth control may not work properly while you are taking this medicine. Talk to your health care professional about using an extra method of birth control.  This medicine may cause serious skin reactions. They can happen weeks to months after starting the medicine. Contact your health care provider right away if you notice fevers or flu-like symptoms with a rash. The rash may be red or purple and then turn into blisters or peeling of the skin. Or, you might notice a red rash with swelling of the face, lips or lymph nodes in your neck or under your arms.  What side effects may I notice from receiving this medicine?  Side effects that you should report to your doctor or health care professional as soon as  possible:  allergic reactions like skin rash, itching or hives, swelling of the face, lips, or tongue  bloody or watery diarrhea  breathing problems  feeling faint; lightheaded, falls  fever  redness, blistering, peeling or loosening of the skin, including inside the mouth  seizures  signs and symptoms of kidney injury like trouble passing urine or change in the amount of urine  signs and symptoms of liver injury like dark yellow or brown urine; general ill feeling or flu-like symptoms; light-colored stools; loss of appetite; nausea; right upper belly pain; unusually weak or tired; yellowing of the eyes or skin  unusual bleeding or bruising  unusually weak or tired  Side effects that usually do not require medical attention (report to your doctor or health care professional if they continue or are bothersome):  anxious  confusion  diarrhea  dizziness  headache  nausea, vomiting  stomach upset  trouble sleeping  This list may not describe all possible side effects. Call your doctor for medical advice about side effects. You may report side effects to FDA at 8-390-RDT-5911.  Where should I keep my medicine?  Keep out of the reach of children.  Store at room temperature between 20 and 25 degrees C (68 and 77 degrees F). Throw away any unused medicine after the expiration date.  NOTE: This sheet is a summary. It may not cover all possible information. If you have questions about this medicine, talk to your doctor, pharmacist, or health care provider.  © 2020 [employer] (2020-02-28 14:32:29)    Acute Respiratory Failure, Adult    Acute respiratory failure occurs when there is not enough oxygen passing from your lungs to your body. When this happens, your lungs have trouble removing carbon dioxide from the blood. This causes your blood oxygen level to drop too low as carbon dioxide builds up.  Acute respiratory failure is a medical emergency. It can develop quickly, but it is temporary if treated promptly.  Your lung capacity, or how much air your lungs can hold, may improve with time, exercise, and treatment.  What are the causes?  There are many possible causes of acute respiratory failure, including:  · Lung injury.  · Chest injury or damage to the ribs or tissues near the lungs.  · Lung conditions that affect the flow of air and blood into and out of the lungs, such as pneumonia, acute respiratory distress syndrome, and cystic fibrosis.  · Medical conditions, such as strokes or spinal cord injuries, that affect the muscles and nerves that control breathing.  · Blood infection (sepsis).  · Inflammation of the pancreas (pancreatitis).  · A blood clot in the lungs (pulmonary embolism).  · A large-volume blood transfusion.  · Burns.  · Near-drowning.  · Seizure.  · Smoke inhalation.  · Reaction to medicines.  · Alcohol or drug overdose.  What increases the risk?  This condition is more likely to develop in people who have:  · A blocked airway.  · Asthma.  · A condition or disease that damages or weakens the muscles, nerves, bones, or tissues that are involved in breathing.  · A serious infection.  · A health problem that blocks the unconscious reflex that is involved in breathing, such as hypothyroidism or sleep apnea.  · A lung injury or trauma.  What are the signs or symptoms?  Trouble breathing is the main symptom of acute respiratory failure. Symptoms may also include:  · Rapid breathing.  · Restlessness or anxiety.  · Skin, lips, or fingernails that appear blue (cyanosis).  · Rapid heart rate.  · Abnormal heart rhythms (arrhythmias).  · Confusion or changes in behavior.  · Tiredness or loss of energy.  · Feeling sleepy or having a loss of consciousness.  How is this diagnosed?  Your health care provider can diagnose acute respiratory failure with a medical history and physical exam. During the exam, your health care provider will listen to your heart and check for crackling or wheezing sounds in your lungs. Your  may also have tests to confirm the diagnosis and determine what is causing respiratory failure. These tests may include:  · Measuring the amount of oxygen in your blood (pulse oximetry). The measurement comes from a small device that is placed on your finger, earlobe, or toe.  · Other blood tests to measure blood gases and to look for signs of infection.  · Sampling your cerebral spinal fluid or tracheal fluid to check for infections.  · Chest X-ray to look for fluid in spaces that should be filled with air.  · Electrocardiogram (ECG) to look at the heart's electrical activity.  How is this treated?  Treatment for this condition usually takes places in a hospital intensive care unit (ICU). Treatment depends on what is causing the condition. It may include one or more treatments until your symptoms improve. Treatment may include:  · Supplemental oxygen. Extra oxygen is given through a tube in the nose, a face mask, or a oshea.  · A device such as a continuous positive airway pressure (CPAP) or bi-level positive airway pressure (BiPAP or BPAP) machine. This treatment uses mild air pressure to keep the airways open. A mask or other device will be placed over your nose or mouth. A tube that is connected to a motor will deliver oxygen through the mask.  · Ventilator. This treatment helps move air into and out of the lungs. This may be done with a bag and mask or a machine. For this treatment, a tube is placed in your windpipe (trachea) so air and oxygen can flow to the lungs.  · Extracorporeal membrane oxygenation (ECMO). This treatment temporarily takes over the function of the heart and lungs, supplying oxygen and removing carbon dioxide. ECMO gives the lungs a chance to recover. It may be used if a ventilator is not effective.  · Tracheostomy. This is a procedure that creates a hole in the neck to insert a breathing tube.  · Receiving fluids and medicines.  · Rocking the bed to help breathing.  Follow these  instructions at home:  · Take over-the-counter and prescription medicines only as told by your health care provider.  · Return to normal activities as told by your health care provider. Ask your health care provider what activities are safe for you.  · Keep all follow-up visits as told by your health care provider. This is important.  How is this prevented?  Treating infections and medical conditions that may lead to acute respiratory failure can help prevent the condition from developing.  Contact a health care provider if:  · You have a fever.  · Your symptoms do not improve or they get worse.  Get help right away if:  · You are having trouble breathing.  · You lose consciousness.  · Your have cyanosis or turn blue.  · You develop a rapid heart rate.  · You are confused.  These symptoms may represent a serious problem that is an emergency. Do not wait to see if the symptoms will go away. Get medical help right away. Call your local emergency services (911 in the [place] Do not drive yourself to the hospital.  This information is not intended to replace advice given to you by your health care provider. Make sure you discuss any questions you have with your health care provider.  Document Released: 12/23/2014 Document Revised: 11/30/2018 Document Reviewed: 07/05/2017  [employer] Patient Education © 2020 [employer]    Prednisone tablets  What is this medicine?  PREDNISONE (PRED ni sone) is a corticosteroid. It is commonly used to treat inflammation of the skin, joints, lungs, and other organs. Common conditions treated include asthma, allergies, and arthritis. It is also used for other conditions, such as blood disorders and diseases of the adrenal glands.  This medicine may be used for other purposes; ask your health care provider or pharmacist if you have questions.  COMMON BRAND NAME(S): Deltasone, Predone, Sterapred, Sterapred DS  What should I tell my health care provider before I take this medicine?  They need  to know if you have any of these conditions:  · Cushing's syndrome  · diabetes  · glaucoma  · heart disease  · high blood pressure  · infection (especially a virus infection such as chickenpox, cold sores, or herpes)  · kidney disease  · liver disease  · mental illness  · myasthenia gravis  · osteoporosis  · seizures  · stomach or intestine problems  · thyroid disease  · an unusual or allergic reaction to lactose, prednisone, other medicines, foods, dyes, or preservatives  · pregnant or trying to get pregnant  · breast-feeding  How should I use this medicine?  Take this medicine by mouth with a glass of water. Follow the directions on the prescription label. Take this medicine with food. If you are taking this medicine once a day, take it in the morning. Do not take more medicine than you are told to take. Do not suddenly stop taking your medicine because you may develop a severe reaction. Your doctor will tell you how much medicine to take. If your doctor wants you to stop the medicine, the dose may be slowly lowered over time to avoid any side effects.  Talk to your pediatrician regarding the use of this medicine in children. Special care may be needed.  Overdosage: If you think you have taken too much of this medicine contact a poison control center or emergency room at once.  NOTE: This medicine is only for you. Do not share this medicine with others.  What if I miss a dose?  If you miss a dose, take it as soon as you can. If it is almost time for your next dose, talk to your doctor or health care professional. You may need to miss a dose or take an extra dose. Do not take double or extra doses without advice.  What may interact with this medicine?  Do not take this medicine with any of the following medications:  · metyrapone  · mifepristone  This medicine may also interact with the following medications:  · aminoglutethimide  · amphotericin B  · aspirin and aspirin-like medicines  · barbiturates  · certain  medicines for diabetes, like glipizide or glyburide  · cholestyramine  · cholinesterase inhibitors  · cyclosporine  · digoxin  · diuretics  · ephedrine  · female hormones, like estrogens and birth control pills  · isoniazid  · ketoconazole  · NSAIDS, medicines for pain and inflammation, like ibuprofen or naproxen  · phenytoin  · rifampin  · toxoids  · vaccines  · warfarin  This list may not describe all possible interactions. Give your health care provider a list of all the medicines, herbs, non-prescription drugs, or dietary supplements you use. Also tell them if you smoke, drink alcohol, or use illegal drugs. Some items may interact with your medicine.  What should I watch for while using this medicine?  Visit your doctor or health care professional for regular checks on your progress. If you are taking this medicine over a prolonged period, carry an identification card with your name and address, the type and dose of your medicine, and your doctor's name and address.  This medicine may increase your risk of getting an infection. Tell your doctor or health care professional if you are around anyone with measles or chickenpox, or if you develop sores or blisters that do not heal properly.  If you are going to have surgery, tell your doctor or health care professional that you have taken this medicine within the last twelve months.  Ask your doctor or health care professional about your diet. You may need to lower the amount of salt you eat.  This medicine may increase blood sugar. Ask your healthcare provider if changes in diet or medicines are needed if you have diabetes.  What side effects may I notice from receiving this medicine?  Side effects that you should report to your doctor or health care professional as soon as possible:  · allergic reactions like skin rash, itching or hives, swelling of the face, lips, or tongue  · changes in emotions or moods  · changes in vision  · depressed mood  · eye pain  · fever  or chills, cough, sore throat, pain or difficulty passing urine  · signs and symptoms of high blood sugar such as being more thirsty or hungry or having to urinate more than normal. You may also feel very tired or have blurry vision.  · swelling of ankles, feet  Side effects that usually do not require medical attention (report to your doctor or health care professional if they continue or are bothersome):  · confusion, excitement, restlessness  · headache  · nausea, vomiting  · skin problems, acne, thin and shiny skin  · trouble sleeping  · weight gain  This list may not describe all possible side effects. Call your doctor for medical advice about side effects. You may report side effects to FDA at 1-574-XOO-9246.  Where should I keep my medicine?  Keep out of the reach of children.  Store at room temperature between 15 and 30 degrees C (59 and 86 degrees F). Protect from light. Keep container tightly closed. Throw away any unused medicine after the expiration date.  NOTE: This sheet is a summary. It may not cover all possible information. If you have questions about this medicine, talk to your doctor, pharmacist, or health care provider.  © 2020 [employer] (2019-09-17 10:54:22)

## 2022-01-22 NOTE — DISCHARGE SUMMARY
Discharge Summary    CHIEF COMPLAINT ON ADMISSION  Chief Complaint   Patient presents with   • Shortness of Breath       Reason for Admission  ems     Admission Date  1/20/2022    CODE STATUS  Prior    HPI & HOSPITAL COURSE  This is a 81 y.o. male here with past medical history of diabetes, A. fib, hypertension, COPD on 4 L of oxygen who presents to the hospital for shortness of breath.  It was associated with orthopnea.  The patient denies any chest pain.  In the ER he was placed on nitroglycerin drip and was sent to IM for BiPAP.  Patient symptoms rapidly improved with IV Lasix.  Chest x-ray found right lower lobe infiltrate.  He was started on ceftriaxone and azithromycin.  He was transitioned to Augmentin.  Cardiac echo found a EF of 35% and RVSP of 51.  The patient was already on OMT medications including Coreg, losartan, Aldactone.  The patient was back to his baseline oxygen.  In addition I decreased his oral Lasix at home.  He was asked to follow-up with cardiology as an outpatient.      Therefore, he is discharged in good and stable condition to home with close outpatient follow-up.    The patient met 2-midnight criteria for an inpatient stay at the time of discharge.    Discharge Date  1/22/2022    FOLLOW UP ITEMS POST DISCHARGE  Follow up with cardiology as outpatient     DISCHARGE DIAGNOSES  Principal Problem:    Acute on chronic respiratory failure with hypoxia (HCC) POA: Yes  Active Problems:    Essential hypertension POA: Yes    Type 2 diabetes mellitus (HCC) POA: Yes      Overview: 3/16/11: Microalbumin/Cr ratio: 13    Mixed hyperlipidemia POA: Yes      Overview: 3/16/11: 126/91/42/66    A-fib (HCC) POA: Yes    CKD (chronic kidney disease) stage 3, GFR 30-59 ml/min (HCC) POA: Unknown    BPH (benign prostatic hyperplasia) POA: Yes    COPD (chronic obstructive pulmonary disease) (HCC) POA: Unknown    Acute congestive heart failure (HCC) POA: Unknown  Resolved Problems:    Respiratory failure (HCC)  POA: Yes      FOLLOW UP  No future appointments.  [facility]  [address] [id number] [phone]  Go on 1/28/2022  [facility] a [facility] at 9 am to receive follow up care on your heart failure diagnosis. Thank you.      MEDICATIONS ON DISCHARGE     Medication List      START taking these medications      Instructions   amoxicillin-clavulanate 875-125 MG Tabs  Commonly known as: AUGMENTIN   Take 1 Tablet by mouth every 12 hours for 7 days.  Dose: 1 Tablet     potassium chloride SA 10 MEQ Tbcr  Commonly known as: K-DUR   Take 4 Tablets by mouth every day for 90 days.  Dose: 40 mEq     predniSONE 20 MG Tabs  Commonly known as: DELTASONE   Take 2 Tablets by mouth every day for 5 days.  Dose: 40 mg        CHANGE how you take these medications      Instructions   * albuterol 108 (90 Base) MCG/ACT Aers inhalation aerosol  What changed: Another medication with the same name was added. Make sure you understand how and when to take each.   Inhale 2 Puffs every four hours as needed for Shortness of Breath.  Dose: 2 Puff     * albuterol 2.5mg/3ml Nebu solution for nebulization  What changed: You were already taking a medication with the same name, and this prescription was added. Make sure you understand how and when to take each.  Commonly known as: PROVENTIL   Take 3 mL by nebulization every four hours as needed for Shortness of Breath for up to 30 days.  Dose: 2.5 mg     furosemide 20 MG Tabs  What changed: how much to take  Commonly known as: LASIX   Take 2 Tablets by mouth every day.  Dose: 40 mg         * This list has 2 medication(s) that are the same as other medications prescribed for you. Read the directions carefully, and ask your doctor or other care provider to review them with you.            CONTINUE taking these medications      Instructions   Alogliptin Benzoate 12.5 MG Tabs   Take 12.5 mg by mouth every  evening.  Dose: 12.5 mg     amLODIPine 10 MG Tabs  Commonly known as: NORVASC   Take 10 mg by mouth every day.  Dose: 10 mg     apixaban 5mg Tabs  Commonly known as: ELIQUIS   Take 5 mg by mouth 2 times a day.  Dose: 5 mg     aspirin EC 81 MG Tbec  Commonly known as: ECOTRIN   Take 81 mg by mouth every morning.  Dose: 81 mg     atorvastatin 40 MG Tabs  Commonly known as: LIPITOR   Take 40 mg by mouth every evening.  Dose: 40 mg     carvedilol 12.5 MG Tabs  Commonly known as: COREG   Take 12.5 mg by mouth 2 times a day.  Dose: 12.5 mg     cyanocobalamin 500 MCG Tabs  Commonly known as: VITAMIN B-12   Take 1,000 mcg by mouth every day.  Dose: 1,000 mcg     Empagliflozin 25 MG Tabs   Take 12.5 mg by mouth every morning. 1/2 tablet = 12.5 mg.  Dose: 12.5 mg     finasteride 5 MG Tabs  Commonly known as: PROSCAR   Take 5 mg by mouth every day.  Dose: 5 mg     fluorouracil 5 % cream  Commonly known as: EFUDEX   Apply 1 Application topically at bedtime as needed. Apply at bedtime to actinic keratoses. Wash off in the morning. Do not use for more than 2 weeks at a time.  Dose: 1 Application     isosorbide mononitrate SR 60 MG Tb24  Commonly known as: IMDUR   Take 120 mg by mouth every morning. 2 tablets = 120 mg.  Dose: 120 mg     losartan 100 MG Tabs  Commonly known as: COZAAR   Take 100 mg by mouth every evening.  Dose: 100 mg     metformin 1000 MG tablet  Commonly known as: GLUCOPHAGE   Take 1,000 mg by mouth 2 times a day.  Dose: 1,000 mg     omeprazole 40 MG delayed-release capsule  Commonly known as: PRILOSEC   Take 40 mg by mouth 2 times daily, before breakfast and dinner.  Dose: 40 mg     tamsulosin 0.4 MG capsule  Commonly known as: FLOMAX   Take 0.4 mg by mouth 1/2 hour after breakfast. Indications: Benign Enlargement of Prostate  Dose: 0.4 mg            Allergies  No Known Allergies    DIET  No orders of the defined types were placed in this encounter.      ACTIVITY  As tolerated.  Weight bearing as  tolerated    CONSULTATIONS  Pulmonary      PROCEDURES  none    LABORATORY  Lab Results   Component Value Date    SODIUM 140 01/22/2022    POTASSIUM 3.3 (L) 01/22/2022    CHLORIDE 105 01/22/2022    CO2 23 01/22/2022    GLUCOSE 147 (H) 01/22/2022    BUN 25 (H) 01/22/2022    CREATININE 1.18 01/22/2022    CREATININE 1.0 05/18/2009        Lab Results   Component Value Date    WBC 9.8 01/22/2022    HEMOGLOBIN 11.8 (L) 01/22/2022    HEMATOCRIT 37.1 (L) 01/22/2022    PLATELETCT 197 01/22/2022        Total time of the discharge process exceeds 50 minutes.

## 2022-01-22 NOTE — PROGRESS NOTES
Bedside report received from night RN, pt care assumed, assessment completed. Pt is A&O4, pain 0. Updated on POC, questions answered. Bed in lowest, locked position, treaded socks on, call light and belongings within reach.

## 2022-01-22 NOTE — PROGRESS NOTES
Pt received from [facility]. Tele monitor in place. VSS. Pt oriented to room. Educated on use of the call light. Pt demonstrated use of the call light. Discussed POC. All questions answered.

## 2022-01-22 NOTE — CARE PLAN
The patient is Watcher - Medium risk of patient condition declining or worsening    Shift Goals  Clinical Goals: Monitor oxygenation status, monitor for changes to mentation  Patient Goals: rest/sleep comfortably, breathe easier  Family Goals: none present    Progress made toward(s) clinical / shift goals:  Progressing       Problem: Knowledge Deficit - Standard  Goal: Patient and family/care givers will demonstrate understanding of plan of care, disease process/condition, diagnostic tests and medications  Outcome: Progressing  Note: Pt updated on POC, no questions at this time      Problem: Knowledge Deficit - COPD  Goal: Patient/significant other demonstrates understanding of disease process, utilization of the Action Plan, medications and discharge instruction  Outcome: Progressing  Note: Pt updated on POC, no questions at this time.      Problem: Ineffective Airway Clearance  Goal: Patient will maintain patent airway with clear/clearing breath sounds  Outcome: Progressing  Flowsheets (Taken 1/22/2022 1058)  Ineffective Airway Clearance:   Positioned head midline with flexion   Assisted with patient positioning   Assessed breath sounds   Encouraged pursed-lip breathing exercises   Encouraged abdominal breathing exercises   Assisted with measures to improve effectiveness of cough effort   Assisted patient turn every 2 hours   Ambulated patient   Promoted systemic fluid hydration within cardiac tolerance   Collaborated with RT to administer medications/treatments as needed   Provided warm or tepid liquids  Note: Pt understands different positioning as well as breathing exercises to help with their COPD.      Problem: Pain - Standard  Goal: Alleviation of pain or a reduction in pain to the patient’s comfort goal  Outcome: Progressing  Note: Pt encouraged to voice feelings of pain, pt medicated per MAR.

## 2022-01-22 NOTE — PROGRESS NOTES
Home O2 evaluation completed with patient and determined patient needs to be sent home with oxygen on discharge. Patient refused to have oxygen delivered. MD notified

## 2022-01-23 NOTE — PROGRESS NOTES
Pt is discharging, has been educated on discharge, heart failure, diabetes management, COPD, and new medications. Pt is alert and oriented x4 and will be discharging to home by car with daughter. Discussed follow up appointments, and worsening signs and symptoms. Patient has no questions at this time. Pt has all of their belongings and was wheeled downstairs by staff.

## 2022-01-23 NOTE — CONSULTS
Pulmonology Consult Note:     Date of Service: 1/22/2022  Attending: Dr.Lovely CUETO  Resident: Dr. Garcia     Reason for Consult:   COPD exacerbation     Subjective:  Oleg Cook is a 81 Year old male with unclear Hx of COPD,w/ no PFT on file, Former smoker quit 50 years ago, HFrEF , Chronic Hypoxia using 4L baseline,  Afib on eliquis, HTN, who was admitted 1/20/2022 w/ CHF exacerbation.   Pt recently hospitalized 7/2021 w/ AHRF secondary to CHF exacerbation improved with Diuresis.     Interval Hx:  1/22-  No acute events overnight, pt laying in bed comfortably on evaluation. Pt describes general improvement of symptoms, with improvement of dyspnea w/ IV lasix administration. Denies fever, chills, wheezing, cough . Pt euvolemic on exam, no lower leg edema, mild Rales on exam, no wheezing.       Review of Systems:    ROS    Objective Data:   Physical Exam:   Vitals:   Temp:  [36.6 °C (97.9 °F)-37.1 °C (98.7 °F)] 37.1 °C (98.7 °F)  Pulse:  [63-73] 64  Resp:  [16-18] 16  BP: ()/(61-73) 130/73  SpO2:  [94 %-97 %] 97 %     Physical Exam  Constitutional:       General: He is not in acute distress.     Appearance: He is not ill-appearing.   HENT:      Mouth/Throat:      Mouth: Mucous membranes are moist.      Pharynx: Oropharynx is clear.   Cardiovascular:      Rate and Rhythm: Normal rate and regular rhythm.      Pulses: Normal pulses.      Heart sounds: No murmur heard.      Pulmonary:      Effort: Pulmonary effort is normal. No respiratory distress.      Breath sounds: Rales present. No wheezing.   Abdominal:      General: Abdomen is flat. There is no distension.      Palpations: Abdomen is soft.   Musculoskeletal:         General: No swelling.      Right lower leg: No edema.      Left lower leg: No edema.   Skin:     Capillary Refill: Capillary refill takes less than 2 seconds.   Neurological:      General: No focal deficit present.      Mental Status: He is alert and oriented to person, place, and time.    Psychiatric:         Mood and Affect: Mood normal.           Labs:   Lab Results   Component Value Date/Time    SODIUM 140 01/22/2022 03:03 AM    POTASSIUM 3.3 (L) 01/22/2022 03:03 AM    CHLORIDE 105 01/22/2022 03:03 AM    CO2 23 01/22/2022 03:03 AM    GLUCOSE 147 (H) 01/22/2022 03:03 AM    BUN 25 (H) 01/22/2022 03:03 AM    CREATININE 1.18 01/22/2022 03:03 AM    CREATININE 1.0 05/18/2009 12:13 PM      Imaging:   EC-ECHOCARDIOGRAM COMPLETE W/O CONT   Final Result      DX-CHEST-PORTABLE (1 VIEW)   Final Result      Cardiomegaly and findings suggesting CHF.          Problem Representation:     No new Assessment & Plan notes have been filed under this hospital service since the last note was generated.  Service: Pulmonary      # AHRF secondary to CHF exacerbation  Pt Orthopnic at baseline presents w/ severe respiratory distress, improved w/ IV lasix.   Elevated BNP on admission, Echo shows EF 35%, decline from previous echo 7/2021 with EF of 55%.   CXR shows enlarged heart with right sided pleural edema.   Pt not treated for COPD, states no previous treatment for COPD, No wheezing on exam.

## 2022-01-24 RX ORDER — ALBUTEROL SULFATE 2.5 MG/3ML
2.5 SOLUTION RESPIRATORY (INHALATION) EVERY 4 HOURS PRN
Qty: 75 ML | Refills: 0 | Status: SHIPPED | OUTPATIENT
Start: 2022-01-24 | End: 2022-02-23

## 2022-11-10 ENCOUNTER — PATIENT MESSAGE (OUTPATIENT)
Dept: HEALTH INFORMATION MANAGEMENT | Facility: OTHER | Age: 82
End: 2022-11-10

## 2024-02-21 ENCOUNTER — APPOINTMENT (OUTPATIENT)
Dept: RADIOLOGY | Facility: MEDICAL CENTER | Age: 84
DRG: 242 | End: 2024-02-21
Attending: EMERGENCY MEDICINE
Payer: MEDICARE

## 2024-02-21 ENCOUNTER — APPOINTMENT (OUTPATIENT)
Dept: CARDIOLOGY | Facility: MEDICAL CENTER | Age: 84
DRG: 242 | End: 2024-02-21
Attending: INTERNAL MEDICINE
Payer: MEDICARE

## 2024-02-21 ENCOUNTER — APPOINTMENT (OUTPATIENT)
Dept: CARDIOLOGY | Facility: MEDICAL CENTER | Age: 84
DRG: 242 | End: 2024-02-21
Attending: NURSE PRACTITIONER
Payer: MEDICARE

## 2024-02-21 ENCOUNTER — HOSPITAL ENCOUNTER (INPATIENT)
Facility: MEDICAL CENTER | Age: 84
LOS: 2 days | DRG: 242 | End: 2024-02-23
Attending: EMERGENCY MEDICINE | Admitting: INTERNAL MEDICINE
Payer: MEDICARE

## 2024-02-21 PROBLEM — R79.89 ELEVATED TROPONIN: Status: ACTIVE | Noted: 2024-02-21

## 2024-02-21 PROBLEM — R00.1 BRADYCARDIA: Status: ACTIVE | Noted: 2024-02-21

## 2024-02-21 PROBLEM — E87.29 HIGH ANION GAP METABOLIC ACIDOSIS: Status: ACTIVE | Noted: 2024-02-21

## 2024-02-21 PROBLEM — J96.11 CHRONIC RESPIRATORY FAILURE WITH HYPOXIA (HCC): Status: ACTIVE | Noted: 2021-07-27

## 2024-02-21 PROBLEM — I27.20 PULMONARY HYPERTENSION (HCC): Status: ACTIVE | Noted: 2024-02-21

## 2024-02-21 PROBLEM — K21.9 GERD (GASTROESOPHAGEAL REFLUX DISEASE): Status: ACTIVE | Noted: 2024-02-21

## 2024-02-21 PROBLEM — I50.22 CHRONIC SYSTOLIC CONGESTIVE HEART FAILURE (HCC): Status: ACTIVE | Noted: 2022-01-21

## 2024-02-21 PROBLEM — I35.1 AORTIC INSUFFICIENCY: Status: ACTIVE | Noted: 2024-02-21

## 2024-02-21 LAB
ALBUMIN SERPL BCP-MCNC: 3.8 G/DL (ref 3.2–4.9)
ALBUMIN/GLOB SERPL: 1.4 G/DL
ALP SERPL-CCNC: 104 U/L (ref 30–99)
ALT SERPL-CCNC: 23 U/L (ref 2–50)
ANION GAP SERPL CALC-SCNC: 18 MMOL/L (ref 7–16)
APPEARANCE UR: CLEAR
APTT PPP: 29.4 SEC (ref 24.7–36)
AST SERPL-CCNC: 16 U/L (ref 12–45)
BASOPHILS # BLD AUTO: 0.5 % (ref 0–1.8)
BASOPHILS # BLD: 0.07 K/UL (ref 0–0.12)
BILIRUB SERPL-MCNC: 0.7 MG/DL (ref 0.1–1.5)
BILIRUB UR QL STRIP.AUTO: NEGATIVE
BUN SERPL-MCNC: 22 MG/DL (ref 8–22)
CALCIUM ALBUM COR SERPL-MCNC: 8.7 MG/DL (ref 8.5–10.5)
CALCIUM SERPL-MCNC: 8.5 MG/DL (ref 8.5–10.5)
CHLORIDE SERPL-SCNC: 103 MMOL/L (ref 96–112)
CO2 SERPL-SCNC: 17 MMOL/L (ref 20–33)
COLOR UR: YELLOW
CREAT SERPL-MCNC: 1.41 MG/DL (ref 0.5–1.4)
EKG IMPRESSION: NORMAL
EKG IMPRESSION: NORMAL
EOSINOPHIL # BLD AUTO: 0.03 K/UL (ref 0–0.51)
EOSINOPHIL NFR BLD: 0.2 % (ref 0–6.9)
ERYTHROCYTE [DISTWIDTH] IN BLOOD BY AUTOMATED COUNT: 48.3 FL (ref 35.9–50)
ETHANOL BLD-MCNC: <10.1 MG/DL
GFR SERPLBLD CREATININE-BSD FMLA CKD-EPI: 50 ML/MIN/1.73 M 2
GLOBULIN SER CALC-MCNC: 2.7 G/DL (ref 1.9–3.5)
GLUCOSE BLD STRIP.AUTO-MCNC: 170 MG/DL (ref 65–99)
GLUCOSE BLD STRIP.AUTO-MCNC: 203 MG/DL (ref 65–99)
GLUCOSE BLD STRIP.AUTO-MCNC: 210 MG/DL (ref 65–99)
GLUCOSE SERPL-MCNC: 245 MG/DL (ref 65–99)
GLUCOSE UR STRIP.AUTO-MCNC: >=1000 MG/DL
HCT VFR BLD AUTO: 50.1 % (ref 42–52)
HGB BLD-MCNC: 16.5 G/DL (ref 14–18)
IMM GRANULOCYTES # BLD AUTO: 0.11 K/UL (ref 0–0.11)
IMM GRANULOCYTES NFR BLD AUTO: 0.8 % (ref 0–0.9)
INR PPP: 1.54 (ref 0.87–1.13)
KETONES UR STRIP.AUTO-MCNC: 15 MG/DL
LACTATE SERPL-SCNC: 1.8 MMOL/L (ref 0.5–2)
LEUKOCYTE ESTERASE UR QL STRIP.AUTO: NEGATIVE
LIPASE SERPL-CCNC: 27 U/L (ref 11–82)
LV EJECT FRACT MOD 2C 99903: 66.89
LV EJECT FRACT MOD 4C 99902: 56.75
LV EJECT FRACT MOD BP 99901: 60
LYMPHOCYTES # BLD AUTO: 2.56 K/UL (ref 1–4.8)
LYMPHOCYTES NFR BLD: 18.7 % (ref 22–41)
MAGNESIUM SERPL-MCNC: 1.6 MG/DL (ref 1.5–2.5)
MCH RBC QN AUTO: 29.2 PG (ref 27–33)
MCHC RBC AUTO-ENTMCNC: 32.9 G/DL (ref 32.3–36.5)
MCV RBC AUTO: 88.5 FL (ref 81.4–97.8)
MICRO URNS: ABNORMAL
MONOCYTES # BLD AUTO: 0.74 K/UL (ref 0–0.85)
MONOCYTES NFR BLD AUTO: 5.4 % (ref 0–13.4)
NEUTROPHILS # BLD AUTO: 10.18 K/UL (ref 1.82–7.42)
NEUTROPHILS NFR BLD: 74.4 % (ref 44–72)
NITRITE UR QL STRIP.AUTO: NEGATIVE
NRBC # BLD AUTO: 0 K/UL
NRBC BLD-RTO: 0 /100 WBC (ref 0–0.2)
NT-PROBNP SERPL IA-MCNC: 1257 PG/ML (ref 0–125)
PH UR STRIP.AUTO: 5 [PH] (ref 5–8)
PHOSPHATE SERPL-MCNC: 3.6 MG/DL (ref 2.5–4.5)
PLATELET # BLD AUTO: 234 K/UL (ref 164–446)
PMV BLD AUTO: 10.6 FL (ref 9–12.9)
POTASSIUM SERPL-SCNC: 3.8 MMOL/L (ref 3.6–5.5)
PROT SERPL-MCNC: 6.5 G/DL (ref 6–8.2)
PROT UR QL STRIP: NEGATIVE MG/DL
PROTHROMBIN TIME: 18.7 SEC (ref 12–14.6)
RBC # BLD AUTO: 5.66 M/UL (ref 4.7–6.1)
RBC UR QL AUTO: NEGATIVE
SODIUM SERPL-SCNC: 138 MMOL/L (ref 135–145)
SP GR UR STRIP.AUTO: 1.03
TROPONIN T SERPL-MCNC: 84 NG/L (ref 6–19)
TROPONIN T SERPL-MCNC: 89 NG/L (ref 6–19)
UROBILINOGEN UR STRIP.AUTO-MCNC: 0.2 MG/DL
WBC # BLD AUTO: 13.7 K/UL (ref 4.8–10.8)

## 2024-02-21 PROCEDURE — 82077 ASSAY SPEC XCP UR&BREATH IA: CPT

## 2024-02-21 PROCEDURE — 700102 HCHG RX REV CODE 250 W/ 637 OVERRIDE(OP)

## 2024-02-21 PROCEDURE — 72125 CT NECK SPINE W/O DYE: CPT

## 2024-02-21 PROCEDURE — 770022 HCHG ROOM/CARE - ICU (200)

## 2024-02-21 PROCEDURE — 96365 THER/PROPH/DIAG IV INF INIT: CPT

## 2024-02-21 PROCEDURE — 99291 CRITICAL CARE FIRST HOUR: CPT

## 2024-02-21 PROCEDURE — 85730 THROMBOPLASTIN TIME PARTIAL: CPT

## 2024-02-21 PROCEDURE — A9270 NON-COVERED ITEM OR SERVICE: HCPCS | Performed by: STUDENT IN AN ORGANIZED HEALTH CARE EDUCATION/TRAINING PROGRAM

## 2024-02-21 PROCEDURE — C1894 INTRO/SHEATH, NON-LASER: HCPCS

## 2024-02-21 PROCEDURE — 700102 HCHG RX REV CODE 250 W/ 637 OVERRIDE(OP): Performed by: STUDENT IN AN ORGANIZED HEALTH CARE EDUCATION/TRAINING PROGRAM

## 2024-02-21 PROCEDURE — 70450 CT HEAD/BRAIN W/O DYE: CPT

## 2024-02-21 PROCEDURE — 83735 ASSAY OF MAGNESIUM: CPT

## 2024-02-21 PROCEDURE — 83880 ASSAY OF NATRIURETIC PEPTIDE: CPT

## 2024-02-21 PROCEDURE — 93306 TTE W/DOPPLER COMPLETE: CPT | Mod: 26 | Performed by: STUDENT IN AN ORGANIZED HEALTH CARE EDUCATION/TRAINING PROGRAM

## 2024-02-21 PROCEDURE — 33210 INSERT ELECTRD/PM CATH SNGL: CPT | Performed by: INTERNAL MEDICINE

## 2024-02-21 PROCEDURE — 93306 TTE W/DOPPLER COMPLETE: CPT

## 2024-02-21 PROCEDURE — 84484 ASSAY OF TROPONIN QUANT: CPT

## 2024-02-21 PROCEDURE — 84100 ASSAY OF PHOSPHORUS: CPT

## 2024-02-21 PROCEDURE — 700111 HCHG RX REV CODE 636 W/ 250 OVERRIDE (IP): Mod: JZ

## 2024-02-21 PROCEDURE — A9270 NON-COVERED ITEM OR SERVICE: HCPCS | Performed by: INTERNAL MEDICINE

## 2024-02-21 PROCEDURE — 700101 HCHG RX REV CODE 250

## 2024-02-21 PROCEDURE — 83690 ASSAY OF LIPASE: CPT

## 2024-02-21 PROCEDURE — 93005 ELECTROCARDIOGRAM TRACING: CPT | Performed by: EMERGENCY MEDICINE

## 2024-02-21 PROCEDURE — 85610 PROTHROMBIN TIME: CPT

## 2024-02-21 PROCEDURE — 81003 URINALYSIS AUTO W/O SCOPE: CPT

## 2024-02-21 PROCEDURE — 80053 COMPREHEN METABOLIC PANEL: CPT

## 2024-02-21 PROCEDURE — A9270 NON-COVERED ITEM OR SERVICE: HCPCS

## 2024-02-21 PROCEDURE — 700102 HCHG RX REV CODE 250 W/ 637 OVERRIDE(OP): Performed by: INTERNAL MEDICINE

## 2024-02-21 PROCEDURE — 82962 GLUCOSE BLOOD TEST: CPT | Mod: 91

## 2024-02-21 PROCEDURE — 99152 MOD SED SAME PHYS/QHP 5/>YRS: CPT | Performed by: INTERNAL MEDICINE

## 2024-02-21 PROCEDURE — 83605 ASSAY OF LACTIC ACID: CPT

## 2024-02-21 PROCEDURE — 5A1223Z PERFORMANCE OF CARDIAC PACING, CONTINUOUS: ICD-10-PCS | Performed by: INTERNAL MEDICINE

## 2024-02-21 PROCEDURE — 99291 CRITICAL CARE FIRST HOUR: CPT | Performed by: INTERNAL MEDICINE

## 2024-02-21 PROCEDURE — 700111 HCHG RX REV CODE 636 W/ 250 OVERRIDE (IP): Performed by: EMERGENCY MEDICINE

## 2024-02-21 PROCEDURE — 700111 HCHG RX REV CODE 636 W/ 250 OVERRIDE (IP): Performed by: INTERNAL MEDICINE

## 2024-02-21 PROCEDURE — 02HL3JZ INSERTION OF PACEMAKER LEAD INTO LEFT VENTRICLE, PERCUTANEOUS APPROACH: ICD-10-PCS | Performed by: INTERNAL MEDICINE

## 2024-02-21 PROCEDURE — 85025 COMPLETE CBC W/AUTO DIFF WBC: CPT

## 2024-02-21 PROCEDURE — 36415 COLL VENOUS BLD VENIPUNCTURE: CPT

## 2024-02-21 RX ORDER — ASPIRIN 81 MG/1
81 TABLET ORAL DAILY
Status: DISCONTINUED | OUTPATIENT
Start: 2024-02-22 | End: 2024-02-23 | Stop reason: HOSPADM

## 2024-02-21 RX ORDER — MAGNESIUM SULFATE HEPTAHYDRATE 40 MG/ML
4 INJECTION, SOLUTION INTRAVENOUS ONCE
Status: COMPLETED | OUTPATIENT
Start: 2024-02-21 | End: 2024-02-21

## 2024-02-21 RX ORDER — DOPAMINE HYDROCHLORIDE 160 MG/100ML
0-20 INJECTION, SOLUTION INTRAVENOUS CONTINUOUS
Status: DISCONTINUED | OUTPATIENT
Start: 2024-02-21 | End: 2024-02-21

## 2024-02-21 RX ORDER — TAMSULOSIN HYDROCHLORIDE 0.4 MG/1
0.4 CAPSULE ORAL
Status: DISCONTINUED | OUTPATIENT
Start: 2024-02-21 | End: 2024-02-21

## 2024-02-21 RX ORDER — MIDAZOLAM HYDROCHLORIDE 1 MG/ML
INJECTION INTRAMUSCULAR; INTRAVENOUS
Status: DISPENSED
Start: 2024-02-21 | End: 2024-02-21

## 2024-02-21 RX ORDER — LOSARTAN POTASSIUM 50 MG/1
100 TABLET ORAL EVERY EVENING
Status: DISCONTINUED | OUTPATIENT
Start: 2024-02-21 | End: 2024-02-23 | Stop reason: HOSPADM

## 2024-02-21 RX ORDER — POTASSIUM CHLORIDE 20 MEQ/1
40 TABLET, EXTENDED RELEASE ORAL ONCE
Status: COMPLETED | OUTPATIENT
Start: 2024-02-21 | End: 2024-02-21

## 2024-02-21 RX ORDER — ASPIRIN 300 MG/1
SUPPOSITORY RECTAL
Status: COMPLETED
Start: 2024-02-21 | End: 2024-02-21

## 2024-02-21 RX ORDER — DOPAMINE HYDROCHLORIDE 160 MG/100ML
INJECTION, SOLUTION INTRAVENOUS
Status: DISPENSED
Start: 2024-02-21 | End: 2024-02-21

## 2024-02-21 RX ORDER — OMEPRAZOLE 20 MG/1
40 CAPSULE, DELAYED RELEASE ORAL
Status: DISCONTINUED | OUTPATIENT
Start: 2024-02-21 | End: 2024-02-23 | Stop reason: HOSPADM

## 2024-02-21 RX ORDER — MIDAZOLAM HYDROCHLORIDE 1 MG/ML
INJECTION INTRAMUSCULAR; INTRAVENOUS
Status: COMPLETED
Start: 2024-02-21 | End: 2024-02-21

## 2024-02-21 RX ORDER — DEXTROSE MONOHYDRATE 25 G/50ML
25 INJECTION, SOLUTION INTRAVENOUS
Status: DISCONTINUED | OUTPATIENT
Start: 2024-02-21 | End: 2024-02-23 | Stop reason: HOSPADM

## 2024-02-21 RX ORDER — ISOSORBIDE MONONITRATE 30 MG/1
120 TABLET, EXTENDED RELEASE ORAL EVERY MORNING
Status: DISCONTINUED | OUTPATIENT
Start: 2024-02-21 | End: 2024-02-23 | Stop reason: HOSPADM

## 2024-02-21 RX ORDER — FUROSEMIDE 20 MG/1
20 TABLET ORAL DAILY
Status: DISCONTINUED | OUTPATIENT
Start: 2024-02-22 | End: 2024-02-23 | Stop reason: HOSPADM

## 2024-02-21 RX ORDER — ONDANSETRON 2 MG/ML
4 INJECTION INTRAMUSCULAR; INTRAVENOUS EVERY 4 HOURS PRN
Status: DISCONTINUED | OUTPATIENT
Start: 2024-02-21 | End: 2024-02-23 | Stop reason: HOSPADM

## 2024-02-21 RX ORDER — POTASSIUM CHLORIDE 20 MEQ/1
40 TABLET, EXTENDED RELEASE ORAL ONCE
Status: DISCONTINUED | OUTPATIENT
Start: 2024-02-21 | End: 2024-02-21

## 2024-02-21 RX ORDER — FINASTERIDE 5 MG/1
5 TABLET, FILM COATED ORAL DAILY
Status: DISCONTINUED | OUTPATIENT
Start: 2024-02-21 | End: 2024-02-23 | Stop reason: HOSPADM

## 2024-02-21 RX ORDER — LIDOCAINE HYDROCHLORIDE 20 MG/ML
INJECTION, SOLUTION INFILTRATION; PERINEURAL
Status: COMPLETED
Start: 2024-02-21 | End: 2024-02-21

## 2024-02-21 RX ORDER — POTASSIUM CHLORIDE 1500 MG/1
20 TABLET, EXTENDED RELEASE ORAL 2 TIMES DAILY
COMMUNITY

## 2024-02-21 RX ORDER — FUROSEMIDE 20 MG/1
40 TABLET ORAL DAILY
Status: DISCONTINUED | OUTPATIENT
Start: 2024-02-21 | End: 2024-02-21

## 2024-02-21 RX ORDER — ONDANSETRON 2 MG/ML
4 INJECTION INTRAMUSCULAR; INTRAVENOUS ONCE
Status: COMPLETED | OUTPATIENT
Start: 2024-02-21 | End: 2024-02-21

## 2024-02-21 RX ORDER — ATORVASTATIN CALCIUM 40 MG/1
40 TABLET, FILM COATED ORAL NIGHTLY
Status: DISCONTINUED | OUTPATIENT
Start: 2024-02-21 | End: 2024-02-23 | Stop reason: HOSPADM

## 2024-02-21 RX ORDER — ONDANSETRON 4 MG/1
4 TABLET, ORALLY DISINTEGRATING ORAL EVERY 4 HOURS PRN
Status: DISCONTINUED | OUTPATIENT
Start: 2024-02-21 | End: 2024-02-23 | Stop reason: HOSPADM

## 2024-02-21 RX ORDER — AMLODIPINE BESYLATE 10 MG/1
10 TABLET ORAL DAILY
Status: DISCONTINUED | OUTPATIENT
Start: 2024-02-21 | End: 2024-02-23 | Stop reason: HOSPADM

## 2024-02-21 RX ADMIN — ISOSORBIDE MONONITRATE 120 MG: 30 TABLET, EXTENDED RELEASE ORAL at 10:58

## 2024-02-21 RX ADMIN — MAGNESIUM SULFATE HEPTAHYDRATE 4 G: 4 INJECTION, SOLUTION INTRAVENOUS at 10:44

## 2024-02-21 RX ADMIN — ASPIRIN: 300 SUPPOSITORY RECTAL at 07:30

## 2024-02-21 RX ADMIN — MIDAZOLAM HYDROCHLORIDE 1 MG: 1 INJECTION, SOLUTION INTRAMUSCULAR; INTRAVENOUS at 07:57

## 2024-02-21 RX ADMIN — INSULIN HUMAN 3 UNITS: 100 INJECTION, SOLUTION PARENTERAL at 20:40

## 2024-02-21 RX ADMIN — ATORVASTATIN CALCIUM 40 MG: 40 TABLET, FILM COATED ORAL at 20:42

## 2024-02-21 RX ADMIN — FINASTERIDE 5 MG: 5 TABLET, FILM COATED ORAL at 10:46

## 2024-02-21 RX ADMIN — POTASSIUM CHLORIDE 40 MEQ: 1500 TABLET, EXTENDED RELEASE ORAL at 10:47

## 2024-02-21 RX ADMIN — INSULIN HUMAN 3 UNITS: 100 INJECTION, SOLUTION PARENTERAL at 10:54

## 2024-02-21 RX ADMIN — AMLODIPINE BESYLATE 10 MG: 10 TABLET ORAL at 10:46

## 2024-02-21 RX ADMIN — Medication 1 LOZENGE: at 21:05

## 2024-02-21 RX ADMIN — ONDANSETRON 4 MG: 2 INJECTION INTRAMUSCULAR; INTRAVENOUS at 07:15

## 2024-02-21 RX ADMIN — DOPAMINE HYDROCHLORIDE 5 MCG/KG/MIN: 160 INJECTION, SOLUTION INTRAVENOUS at 07:05

## 2024-02-21 RX ADMIN — OMEPRAZOLE 40 MG: 20 CAPSULE, DELAYED RELEASE ORAL at 10:46

## 2024-02-21 RX ADMIN — LOSARTAN POTASSIUM 100 MG: 50 TABLET, FILM COATED ORAL at 18:02

## 2024-02-21 RX ADMIN — INSULIN HUMAN 2 UNITS: 100 INJECTION, SOLUTION PARENTERAL at 18:04

## 2024-02-21 RX ADMIN — LIDOCAINE HYDROCHLORIDE: 20 INJECTION, SOLUTION INFILTRATION; PERINEURAL at 07:39

## 2024-02-21 RX ADMIN — FENTANYL CITRATE 50 MCG: 50 INJECTION, SOLUTION INTRAMUSCULAR; INTRAVENOUS at 07:58

## 2024-02-21 ASSESSMENT — PATIENT HEALTH QUESTIONNAIRE - PHQ9
SUM OF ALL RESPONSES TO PHQ9 QUESTIONS 1 AND 2: 0
2. FEELING DOWN, DEPRESSED, IRRITABLE, OR HOPELESS: NOT AT ALL
1. LITTLE INTEREST OR PLEASURE IN DOING THINGS: NOT AT ALL

## 2024-02-21 ASSESSMENT — COGNITIVE AND FUNCTIONAL STATUS - GENERAL
SUGGESTED CMS G CODE MODIFIER DAILY ACTIVITY: CH
MOBILITY SCORE: 24
SUGGESTED CMS G CODE MODIFIER MOBILITY: CH
DAILY ACTIVITIY SCORE: 24

## 2024-02-21 ASSESSMENT — ENCOUNTER SYMPTOMS
SHORTNESS OF BREATH: 0
STRIDOR: 0
BRUISES/BLEEDS EASILY: 0
DIZZINESS: 0
NECK PAIN: 1
BACK PAIN: 0
ABDOMINAL PAIN: 0
FEVER: 0
NERVOUS/ANXIOUS: 0
DEPRESSION: 0
SINUS PAIN: 0
VOMITING: 1
NAUSEA: 1
LOSS OF CONSCIOUSNESS: 1
PALPITATIONS: 0
HALLUCINATIONS: 0
SORE THROAT: 0
CONSTIPATION: 0
DIAPHORESIS: 0
CHILLS: 0
SPUTUM PRODUCTION: 0
EYE PAIN: 0
DIARRHEA: 0
EYE REDNESS: 0
MYALGIAS: 0
COUGH: 0
BLOOD IN STOOL: 0
EYE DISCHARGE: 0
VOMITING: 0
HEADACHES: 0
HEMOPTYSIS: 0

## 2024-02-21 ASSESSMENT — LIFESTYLE VARIABLES
AVERAGE NUMBER OF DAYS PER WEEK YOU HAVE A DRINK CONTAINING ALCOHOL: 1
TOTAL SCORE: 0
HOW MANY TIMES IN THE PAST YEAR HAVE YOU HAD 5 OR MORE DRINKS IN A DAY: 0
EVER HAD A DRINK FIRST THING IN THE MORNING TO STEADY YOUR NERVES TO GET RID OF A HANGOVER: NO
CONSUMPTION TOTAL: NEGATIVE
TOTAL SCORE: 0
ON A TYPICAL DAY WHEN YOU DRINK ALCOHOL HOW MANY DRINKS DO YOU HAVE: 2
HAVE YOU EVER FELT YOU SHOULD CUT DOWN ON YOUR DRINKING: NO
DOES PATIENT WANT TO STOP DRINKING: NO
TOTAL SCORE: 0
HAVE PEOPLE ANNOYED YOU BY CRITICIZING YOUR DRINKING: NO
ALCOHOL_USE: NO
EVER FELT BAD OR GUILTY ABOUT YOUR DRINKING: NO

## 2024-02-21 ASSESSMENT — COPD QUESTIONNAIRES
DO YOU EVER COUGH UP ANY MUCUS OR PHLEGM?: NO/ONLY WITH OCCASIONAL COLDS OR INFECTIONS
HAVE YOU SMOKED AT LEAST 100 CIGARETTES IN YOUR ENTIRE LIFE: YES
DURING THE PAST 4 WEEKS HOW MUCH DID YOU FEEL SHORT OF BREATH: SOME OF THE TIME
COPD SCREENING SCORE: 6

## 2024-02-21 ASSESSMENT — FIBROSIS 4 INDEX: FIB4 SCORE: 1.154849062469952533

## 2024-02-21 NOTE — ASSESSMENT & PLAN NOTE
A1c 7.3.   Hold home Alogliptin, empagliflozin, and metformin  Correctional insulin avaliable  Hypoglycemia protocol

## 2024-02-21 NOTE — ASSESSMENT & PLAN NOTE
Previous echocardiogram with RVSP of 51%  WHO group 2 (systolic heart failure) and group 3 (COPD/chronic hypoxemic respiratory failure)  Continue furosemide, 40 mg daily  Repeat echocardiogram

## 2024-02-21 NOTE — H&P
[facility] History & Physical Note    Date of Service  2/21/2024    [facility] Team: [facility]   Attending: Richard Still M.d.  Senior Resident: Dr. Francois Bernard    Primary Care Physician  No primary care provider on file.    Consultants  cardiology and critical care    Specialist Names: Dr. Aguirre (cardiology), Dr. Olson (cardiology), Dr. Still (critical care)    Code Status  Full Code    Chief Complaint  Chief Complaint   Patient presents with    Irregular Heart Beat     When EMS arrived, pt was placed on the monitor and had long pauses noticed on his EKG where pt would go unresponsive for 5 seconds before answering     Fall     Pt attempted to get up and use the restroom this morning when he became nauseous and had a MGLF, + headstrike, + LOC, + blood thinners       History of Presenting Illness (HPI):  Patient is an 84yo male with PMH of Afib on apixaban, DVT/PE 2011 on apixaban, CAD s/p EBENEZER x1 to LCx 09/2018, HFrEF 35-40% 01/2022, HTN, HLD, DM2, CKD3a, BPH, and COPD (no PFTs), and JULIA on 3L nocturnal that presented 02/21/24 after multiple syncopal episodes at home. Per EMS, long pauses were noticed and patient would become unresponsive for up to 5 seconds at a time. Patient went urgently to cath lab with cardiology for placement of R IJ temporary pacer wire.    Patient states that they did not have any issues until this morning. States that they had passed out several times at home, with one syncopal episode on the bathroom where they fell between the toilet and the sink. Unsure if they hit their head, but states that the left side of their head was hurting afterward. States that they were having nausea and vomited 2-3 times prior to presenting to the ED; describes it as dark brown, without blood.    In the ED, BP 170s-190s/90s, HR 70s-90s, RR 21, afebrile, saturating 97% on 2L NC.  Started on dopamine gtt in the ED. Received rectal ASA 300mg x1 in cath lab. WBC 13.7, Hgb  16.5, , Na 138, K 3.8, CO2 17, AG 18, glucose 245, Cr 1.41, AlkPhos 104, Mg 1.6, troponin 89, BNP 1257, alcohol <10, INR 1.54. EKG Afib HR 91, RBBB, V2-V3 MAURICIO. CT head w/o and CT C-spine w/o without acute abnormality. Pending TTE.    TTE 01/21/22 showing EF 35-40%, mod AI, mod dil LA, RVSP 51mmHg.    I discussed the plan of care with patient.    Review of Systems  Review of Systems   Constitutional:  Positive for malaise/fatigue. Negative for chills and fever.   Respiratory:  Negative for cough and shortness of breath.    Cardiovascular:  Negative for chest pain, palpitations and leg swelling.   Gastrointestinal:  Positive for nausea and vomiting. Negative for abdominal pain, blood in stool, constipation, diarrhea and melena.   Genitourinary:  Negative for dysuria and hematuria.   Musculoskeletal:  Positive for joint pain (L knee) and neck pain (chronic). Negative for back pain.   Neurological:  Positive for loss of consciousness. Negative for dizziness and headaches.   Psychiatric/Behavioral:  Negative for depression.        Past Medical History   has a past medical history of DVT (deep venous thrombosis) (HCC), Essential hypertension, malignant, Mixed hyperlipidemia, Nephritis and nephropathy, not specified as acute or chronic, with other specified pathological lesion in kidney, in diseases classified elsewhere, PE (pulmonary embolism), Type II or unspecified type diabetes mellitus with renal manifestations, not stated as uncontrolled(250.40), Type II or unspecified type diabetes mellitus without mention of complication, not stated as uncontrolled, Unspecified asthma(493.90), and Unspecified vitamin D deficiency (4/26/2012).    Surgical History   has no past surgical history on file.     Family History  family history includes Cancer in his brother; Heart Disease in his father; Hypertension in his father; Stroke in his mother.   Family history reviewed with patient.     Social History  Tobacco: 15py smoker,  quit 50yrs ago  Alcohol: Denies  Recreational drugs (illegal or prescription): Denies  Employment: Retired  Living Situation: Lives with wife, daughter, and grand daughter in [place]  Recent Travel: Denies  Primary Care Provider: Reviewed N/A  Other (stressors, spirituality, exposures): N/A    Allergies  No Known Allergies    Medications  Prior to Admission Medications   Prescriptions Last Dose Informant Patient Reported? Taking?   Alogliptin Benzoate 12.5 MG Tab  Rx Bottle (For Med Information) Yes No   Sig: Take 12.5 mg by mouth every evening.   Empagliflozin 25 MG Tab  Rx Bottle (For Med Information) Yes No   Sig: Take 12.5 mg by mouth every morning. 1/2 tablet = 12.5 mg.   albuterol 108 (90 Base) MCG/ACT Aero Soln inhalation aerosol   Yes No   Sig: Inhale 2 Puffs every four hours as needed for Shortness of Breath.   amLODIPine (NORVASC) 10 MG Tab  Rx Bottle (For Med Information) Yes No   Sig: Take 10 mg by mouth every day.   apixaban (ELIQUIS) 5mg Tab  Rx Bottle (For Med Information) Yes No   Sig: Take 5 mg by mouth 2 times a day.   aspirin EC (ECOTRIN) 81 MG Tablet Delayed Response  Patient Yes No   Sig: Take 81 mg by mouth every morning.   atorvastatin (LIPITOR) 40 MG Tab  Rx Bottle (For Med Information) Yes No   Sig: Take 40 mg by mouth every evening.   carvedilol (COREG) 12.5 MG Tab  Rx Bottle (For Med Information) Yes No   Sig: Take 12.5 mg by mouth 2 times a day.   cyanocobalamin (VITAMIN B-12) 500 MCG Tab   Yes No   Sig: Take 1,000 mcg by mouth every day.   finasteride (PROSCAR) 5 MG Tab  Rx Bottle (For Med Information) Yes No   Sig: Take 5 mg by mouth every day.   fluorouracil (EFUDEX) 5 % cream  Patient Yes No   Sig: Apply 1 Application topically at bedtime as needed. Apply at bedtime to actinic keratoses. Wash off in the morning. Do not use for more than 2 weeks at a time.   furosemide (LASIX) 20 MG Tab   No No   Sig: Take 2 Tablets by mouth every day.   isosorbide mononitrate SR (IMDUR) 60 MG TABLET SR  24 HR  Rx Bottle (For Med Information) Yes No   Sig: Take 120 mg by mouth every morning. 2 tablets = 120 mg.   losartan (COZAAR) 100 MG Tab  Rx Bottle (For Med Information) Yes No   Sig: Take 100 mg by mouth every evening.   metformin (GLUCOPHAGE) 1000 MG tablet  Rx Bottle (For Med Information) Yes No   Sig: Take 1,000 mg by mouth 2 times a day.   omeprazole (PRILOSEC) 40 MG delayed-release capsule  Rx Bottle (For Med Information) Yes No   Sig: Take 40 mg by mouth 2 times daily, before breakfast and dinner.   tamsulosin (FLOMAX) 0.4 MG capsule   Yes No   Sig: Take 0.4 mg by mouth 1/2 hour after breakfast. Indications: Benign Enlargement of Prostate      Facility-Administered Medications: None       Physical Exam  Temp:  [35.8 °C (96.5 °F)-36.1 °C (96.9 °F)] 36.1 °C (96.9 °F)  Pulse:  [59-80] 60  Resp:  [8-23] 16  BP: (127-198)/(60-91) 151/72  SpO2:  [88 %-98 %] 98 %  Blood Pressure : (!) 171/90   Temperature: 35.8 °C (96.5 °F)   Pulse: 80   Respiration: (!) 21   Pulse Oximetry: 97 %       Physical Exam  Constitutional:       General: He is not in acute distress.     Appearance: Normal appearance. He is not diaphoretic.   HENT:      Head: Normocephalic and atraumatic.      Mouth/Throat:      Mouth: Mucous membranes are moist.      Pharynx: No oropharyngeal exudate or posterior oropharyngeal erythema.   Eyes:      General: No scleral icterus.     Extraocular Movements: Extraocular movements intact.      Conjunctiva/sclera: Conjunctivae normal.   Neck:      Comments: R IJ pacer wire  Cardiovascular:      Rate and Rhythm: Normal rate and regular rhythm.      Heart sounds: Normal heart sounds. No murmur heard.     No friction rub. No gallop.   Pulmonary:      Effort: No respiratory distress.      Breath sounds: Normal breath sounds. No stridor. No wheezing, rhonchi or rales.   Abdominal:      General: Abdomen is flat. There is no distension.      Palpations: Abdomen is soft. There is no mass.      Tenderness: There is no  abdominal tenderness. There is no guarding.      Hernia: No hernia is present.   Musculoskeletal:         General: No swelling or tenderness. Normal range of motion.      Cervical back: Neck supple. No rigidity or tenderness.      Right lower leg: No edema.      Left lower leg: No edema.   Lymphadenopathy:      Cervical: No cervical adenopathy.   Skin:     General: Skin is warm and dry.      Coloration: Skin is not jaundiced.   Neurological:      Mental Status: He is alert and oriented to person, place, and time. Mental status is at baseline.      Cranial Nerves: No cranial nerve deficit.   Psychiatric:         Mood and Affect: Mood normal.         Behavior: Behavior normal.         Thought Content: Thought content normal.         Judgment: Judgment normal.         Laboratory:  Recent Labs     02/21/24  0652   WBC 13.7*   RBC 5.66   HEMOGLOBIN 16.5   HEMATOCRIT 50.1   MCV 88.5   MCH 29.2   MCHC 32.9   RDW 48.3   PLATELETCT 234   MPV 10.6     Recent Labs     02/21/24  0652   SODIUM 138   POTASSIUM 3.8   CHLORIDE 103   CO2 17*   GLUCOSE 245*   BUN 22   CREATININE 1.41*   CALCIUM 8.5     Recent Labs     02/21/24  0652   ALTSGPT 23   ASTSGOT 16   ALKPHOSPHAT 104*   TBILIRUBIN 0.7   LIPASE 27   GLUCOSE 245*     Recent Labs     02/21/24  0652   APTT 29.4   INR 1.54*     Recent Labs     02/21/24  0652   NTPROBNP 1257*         Recent Labs     02/21/24  0652   TROPONINT 89*       Imaging:  EC-ECHOCARDIOGRAM COMPLETE W/O CONT   Final Result      CT-CSPINE WITHOUT PLUS RECONS   Final Result      Degenerative changes of the cervical spine without acute fracture or malalignment.      CT-HEAD W/O   Final Result      1.  No evidence of acute territorial infarct, intracranial hemorrhage or mass lesion.   2.  Mild diffuse cerebral substance loss.   3.  Mild microangiopathic ischemic change versus demyelination or gliosis.   4.  Partially calcified inspissated material within the right sphenoid sinus which may related to chronic  and/or fungal infection.         CL-TEMPORARY PACEMAKER INSERT    (Results Pending)       EKG:  I have personally reviewed the images and compared with prior images.    Assessment/Plan:  Problem Representation:   Patient 84yo male with bradycardia, likely requiring PPM.    I anticipate this patient will require at least two midnights for appropriate medical management, necessitating inpatient admission because bradycardia likely requiring PPM.    Patient will need a ICU (Adult and Pediatrics) bed on MEDICAL service .  The need is secondary to bradycardia likely requiring PPM.    * Bradycardia- (present on admission)  Assessment & Plan  Presented with bradycardia and significant pauses with resulting syncopal episodes.    -Hold home carvedilol 12.5mg bid  -Cardiology consult  -s/p R IJ pacer wire 02/21/24  -Will likely need PPM    High anion gap metabolic acidosis- (present on admission)  Assessment & Plan  Likely 2/2 hypoperfusion in the setting of bradycardia.    -Order LA    Elevated troponin- (present on admission)  Assessment & Plan  Likely 2/2 hypoperfusion in the setting of bradycardia. History of CAD s/p EBENEZER x1 to Lcx. Trop 89.    -Denies any chest pain  -Trend trop    GERD (gastroesophageal reflux disease)- (present on admission)  Assessment & Plan  -Continue home omeprazole    Aortic insufficiency- (present on admission)  Assessment & Plan  TTE 01/21/22 showing mod AI    Pulmonary hypertension (HCC)- (present on admission)  Assessment & Plan  Likely 2/2 JULIA with CPAP noncompliance and HFrEF. TTE 01/21/22 showing RVSP 51mmHg.    -Pending TTE  -Continue home Lasix 40mg daily    Chronic systolic congestive heart failure (HCC)- (present on admission)  Assessment & Plan  TTE 01/21/22 showing EF 35-40%, mod dil LA, RVSP 51mmHg. BNP 1257    -Hold home carvedilol 12.5mg bid  -Continue home losartan 100mg daily, isosorbide mononitrate 120mg daily, and Lasix 20mg daily  -Pending TTE    Coronary artery disease  involving native coronary artery of native heart- (present on admission)  Assessment & Plan  CAD s/p EBENEZER x1 to LCx 09/2018.    -Continue ASA/statin    JULIA (obstructive sleep apnea)- (present on admission)  Assessment & Plan  CPAP noncompliance, uses 3L nocturnal.    -3L NC when sleeping    BPH (benign prostatic hyperplasia)- (present on admission)  Assessment & Plan  -Continue home finasteride    CKD (chronic kidney disease) stage 3, GFR 30-59 ml/min ([facility])- (present on admission)  Assessment & Plan  Cr 1.41    -Avoid nephrotoxins  -Renally dose meds    Hx of pulmonary embolus- (present on admission)  Assessment & Plan  History of DVT/PE 2011    -Hold apixaban for possible PPM placement    Chronic respiratory failure with hypoxia (HCC)- (present on admission)  Assessment & Plan  Uses 3L nocturnal, states that seldomly uses with activity if feeling shortness of breath.    -Oxygen supplementation  -RT protocol    Atrial fibrillation (HCC)- (present on admission)  Assessment & Plan  -Hold apixaban as may need PPM placement  -Optimize electrolytes    Dyslipidemia- (present on admission)  Assessment & Plan  -Continue home atorvastatin 40mg daily    Type 2 diabetes mellitus (HCC)- (present on admission)  Assessment & Plan  No recent HbA1c.    -Hold home alogliptin 12.5mg daily, empagliflozin 12.5mg daily, metformin 1000mg bid  -SSI  -Hypoglycemia protocol  -Order HbA1c    Primary hypertension- (present on admission)  Assessment & Plan  BP 170s-190s/90s.    -Goal SBP<160  -Continue home losartan, amlodipine, and isosorbide mononitrate        VTE prophylaxis: SCDs/TEDs and pharmacologic prophylaxis contraindicated due to possible procedure

## 2024-02-21 NOTE — PROGRESS NOTES
Brief EP Note:    Patient admitted with syncope, dizziness and fatigue. Found to be in AF with SVR and prolonged pauses requiring dopamine and TVP placement. Per EMS patient had ~ 10 second pause.     TTE showed low normal LVEF of 50%. H/O LBBB since 07/2021.In AF, frequently V paced on tele.     Plan is for CRT-P tomorrow with Dr. Solomon or Dr. Iqbal. Please keep patient NPO at midnight.     The risks, benefits, and alternatives to permanent pacemaker placement were discussed in great detail.    Specific risks mentioned to the patient including bleeding, cardiac perforation with possible tamponade possibly requiring pericardiocentesis or open heart surgery.  In addition the possibility of lead dislodgment (2-3%), pneumothorax (3%), hemothorax, infection were discussed.    Also, mentioned were the risk of death, stroke, and myocardial infarction.  The patient verbalized understanding of the potential complications and wishes to proceed with the procedure.    Please let me know if you have any questions or concerns.       RENÉ Ordoñez.R.N.  [phone]

## 2024-02-21 NOTE — PROGRESS NOTES
JORDAN Ulloa, at bedside. Plan is now for cath lab for permanent pacemaker placement tomorrow. Orders received for a cardiac/diabetic diet and NPO after midnight. Orders verified and implemented. Sensitivity on pacemaker changed to .8 per direction of JORDAN.

## 2024-02-21 NOTE — DISCHARGE PLANNING
Medical Social Work    Referral: Acute Medical Patient    Intervention: Pt is an 83 year old male brought in by LINDSAY from home after a fall.  Pt is Oleg Cook (: 1940).  Per medics pt's family is on their way.  Emergency contacts are: daughter, Marzena ([phone]) and spouse, Sarah ([phone]).  Report given to JAYME Heller for follow up.    Plan: [facility]ONA will follow.

## 2024-02-21 NOTE — PROGRESS NOTES
Med rec complete per [facility].        Allergies reviewed.    Has patient had any outside antibiotics in the last 30 days? N    Any Anticoagulants (rivaroxaban, apixaban, edoxaban, dabigatran, warfarin, enoxaparin) taken in the last 14 days? Y  Anticoagulant name: Eliquis, Last dose: unknown.        Pharmacy/Pharmacies Pt utilizes : [facility]

## 2024-02-21 NOTE — PROGRESS NOTES
Patient down to CT and back, monitored per hospital policy. Patient's vital signs remained with established limits.     This RN discussed with Lauren, cath lab RN, that there are no post-cath orders. Per Lauren, since patient did not get a left heart cath, there is no need for post-cath orders.

## 2024-02-21 NOTE — H&P
PULMONARY AND CRITICAL CARE MEDICINE HISTORY AND PHYSICAL EXAMINATION    Date of Admission:  2/21/2024    Admitting Physician:  Richard Still MD    Reason for Admission: Profound bradycardia with associated syncope    Chief Complaint: Profound bradycardia with associated syncope    History of Present Illness:    I was kindly asked to see and evaluate Oleg Cook, a 83 y.o. male for evaluation and management of the above problem.    This charming gentleman and  has a history of chronic systolic heart failure with an LVEF of 35%, moderate aortic insufficiency, pulmonary hypertension, atrial fibrillation on chronic anticoagulation with apixaban, COPD, chronic hypoxemic respiratory failure on domiciliary oxygen at night, primary hypertension, DM type II, dyslipidemia, deep venous thrombosis and pulmonary embolism.  He began feeling weak yesterday.  This morning he was standing at his sink in the bathroom when he experienced nausea and then lost consciousness and woke up on the floor.  He admits that he struck his head.  He denies any headache or visual changes.  He denies any new pain in his neck.  He presented to the ED and had profound bradycardia with associated syncope.  He was emergently taken to the cardiac catheterization laboratory and had a temporary transvenous pacemaker placed.  He is now in the CICU.  He is going urgently for a CT scan of his head as well as cervical spine to exclude acute traumatic injuries.    Medications Prior to Admission:    No current facility-administered medications on file prior to encounter.     Current Outpatient Medications on File Prior to Encounter   Medication Sig Dispense Refill    potassium Chloride ER (K-TAB) 20 MEQ Tab CR tablet Take 20 mEq by mouth 2 times a day.      furosemide (LASIX) 20 MG Tab Take 2 Tablets by mouth every day. 60 Tablet 3    metformin (GLUCOPHAGE) 1000 MG tablet Take 1,000 mg by mouth 2 times a day.      amLODIPine (NORVASC) 10 MG  Tab Take 10 mg by mouth every day.      isosorbide mononitrate SR (IMDUR) 60 MG TABLET SR 24 HR Take 120 mg by mouth every morning. 2 tablets = 120 mg.      Alogliptin Benzoate 12.5 MG Tab Take 12.5 mg by mouth every evening.      losartan (COZAAR) 100 MG Tab Take 100 mg by mouth every evening.      carvedilol (COREG) 12.5 MG Tab Take 12.5 mg by mouth 2 times a day.      atorvastatin (LIPITOR) 40 MG Tab Take 40 mg by mouth every evening.      apixaban (ELIQUIS) 5mg Tab Take 5 mg by mouth 2 times a day.      omeprazole (PRILOSEC) 40 MG delayed-release capsule Take 40 mg by mouth 2 times daily, before breakfast and dinner.      finasteride (PROSCAR) 5 MG Tab Take 5 mg by mouth every day.      Empagliflozin 25 MG Tab Take 12.5 mg by mouth every morning. 1/2 tablet = 12.5 mg.         Current Medications:      Current Facility-Administered Medications:     DOPAMINE IN D5W 1.6-5 MG/ML-% IV SOLN, , , ,     MIDAZOLAM HCL 1 MG/ML INJ SOLN (WRAPPER), , , ,     ASPIRIN 300 MG VA SUPP, , , ,     Respiratory Therapy Consult, , Nebulization, Continuous RT, Richard Still M.D.    ondansetron (Zofran) syringe/vial injection 4 mg, 4 mg, Intravenous, Q4HRS PRN, Richard Still M.D.    ondansetron (Zofran ODT) dispertab 4 mg, 4 mg, Oral, Q4HRS PRN, Richard Still M.D. MD Alert...ICU Electrolyte Replacement per Pharmacy, , Other, PHARMACY TO DOSE, Richard Still M.D.    insulin regular (HumuLIN R,NovoLIN R) injection, 2-9 Units, Subcutaneous, 4X/DAY ACHS **AND** POC blood glucose manual result, , , Q AC AND BEDTIME(S) **AND** NOTIFY MD and PharmD, , , Once **AND** Administer 20 grams of glucose (approximately 8 ounces of fruit juice) every 15 minutes PRN FSBG less than 70 mg/dL, , , PRN **AND** dextrose 50% (D50W) injection 25 g, 25 g, Intravenous, Q15 MIN PRN, Richard Still M.D.    magnesium sulfate IVPB premix 4 g, 4 g, Intravenous, Once, Richard Still M.D.    potassium chloride SA  (Kdur) tablet 40 mEq, 40 mEq, Oral, Once, Richard Still M.D.    amLODIPine (Norvasc) tablet 10 mg, 10 mg, Oral, DAILY, Richard Still M.D.    atorvastatin (Lipitor) tablet 40 mg, 40 mg, Oral, Nightly, Richard Still M.D.    finasteride (Proscar) tablet 5 mg, 5 mg, Oral, DAILY, Richard Still M.D.    furosemide (Lasix) tablet 40 mg, 40 mg, Oral, DAILY, Richard Still M.D.    isosorbide mononitrate SR (Imdur) tablet 120 mg, 120 mg, Oral, QAM, Richard Still M.D.    losartan (Cozaar) tablet 100 mg, 100 mg, Oral, Q EVENING, Richard Still M.D.    omeprazole (PriLOSEC) delayed-release capsule CPDR 40 mg, 40 mg, Oral, Daily-0600, Richard Still M.D.    tamsulosin (Flomax) capsule 0.4 mg, 0.4 mg, Oral, AFTER BREAKFAST, Richard Still M.D.    Allergies:    Patient has no known allergies.    Past Surgical History:    History reviewed. No pertinent surgical history.    Past Medical History:    Past Medical History:   Diagnosis Date    DVT (deep venous thrombosis) (HCC)     Essential hypertension, malignant     Mixed hyperlipidemia     Nephritis and nephropathy, not specified as acute or chronic, with other specified pathological lesion in kidney, in diseases classified elsewhere     PE (pulmonary embolism)     Type II or unspecified type diabetes mellitus with renal manifestations, not stated as uncontrolled(250.40)     Type II or unspecified type diabetes mellitus without mention of complication, not stated as uncontrolled     Unspecified asthma(493.90)     Unspecified vitamin D deficiency 4/26/2012       Social History:    Social History     Socioeconomic History    Marital status:      Spouse name: Not on file    Number of children: Not on file    Years of education: Not on file    Highest education level: Not on file   Occupational History    Not on file   Tobacco Use    Smoking status: Former     Current packs/day: 0.00     Average packs/day:  1 pack/day for 20.0 years (20.0 ttl pk-yrs)     Types: Cigarettes     Start date: 1954     Quit date: 1974     Years since quittin.2    Smokeless tobacco: Never   Substance and Sexual Activity    Alcohol use: Yes     Alcohol/week: 0.5 oz     Types: 1 drink(s) per week    Drug use: No    Sexual activity: Yes     Partners: Female     Comment: ,retired    Other Topics Concern     Service Not Asked    Blood Transfusions Not Asked    Caffeine Concern Not Asked    Occupational Exposure Not Asked    Hobby Hazards Not Asked    Sleep Concern Not Asked    Stress Concern Not Asked    Weight Concern Not Asked    Special Diet Not Asked    Back Care Not Asked    Exercise Yes     Comment: walks daily 40-45 minutes    Bike Helmet Not Asked    Seat Belt Not Asked    Self-Exams Not Asked   Social History Narrative    Not on file     Social Determinants of Health     Financial Resource Strain: Not on file   Food Insecurity: Not on file   Transportation Needs: Not on file   Physical Activity: Not on file   Stress: Not on file   Social Connections: Not on file   Intimate Partner Violence: Not on file   Housing Stability: Not on file       Family History:    Family History   Problem Relation Age of Onset    Stroke Mother         brain anuerysm hemorrhage    Heart Disease Father         MI    Hypertension Father     Cancer Brother         Prostate CA       Review of System:    Review of Systems   Constitutional:  Negative for chills, diaphoresis and fever.   HENT:  Negative for congestion, sinus pain and sore throat.    Eyes:  Negative for pain, discharge and redness.   Respiratory:  Negative for hemoptysis, sputum production and stridor.    Cardiovascular:  Negative for chest pain, palpitations and leg swelling.   Gastrointestinal:  Negative for abdominal pain and vomiting.   Genitourinary:  Negative for dysuria, hematuria and urgency.   Musculoskeletal:  Negative for myalgias.   Skin:  Negative  for rash.   Neurological:  Positive for loss of consciousness. Negative for headaches.   Endo/Heme/Allergies:  Does not bruise/bleed easily.   Psychiatric/Behavioral:  Negative for hallucinations. The patient is not nervous/anxious.        Physical Examination:    BP (!) 171/90   Pulse 80   Temp 35.8 °C (96.5 °F) (Temporal)   Resp (!) 21   Ht 1.829 m (6')   Wt 93.4 kg (206 lb)   SpO2 97%   BMI 27.94 kg/m²   Physical Exam  Constitutional:       Appearance: He is not diaphoretic.   HENT:      Head: Normocephalic.      Mouth/Throat:      Pharynx: Oropharynx is clear.   Eyes:      Pupils: Pupils are equal, round, and reactive to light.   Cardiovascular:      Comments: Paced rhythm  Pulmonary:      Breath sounds: No wheezing or rales.   Abdominal:      General: There is no distension.      Tenderness: There is no abdominal tenderness.   Musculoskeletal:      Right lower leg: No edema.      Left lower leg: No edema.   Skin:     General: Skin is warm.      Capillary Refill: Capillary refill takes less than 2 seconds.   Neurological:      General: No focal deficit present.      Mental Status: He is oriented to person, place, and time.      Cranial Nerves: No cranial nerve deficit.         Laboratory Data:        Recent Labs     02/21/24  0652   WBC 13.7*   RBC 5.66   HEMOGLOBIN 16.5   HEMATOCRIT 50.1   MCV 88.5   MCH 29.2   MCHC 32.9   RDW 48.3   PLATELETCT 234   MPV 10.6     Recent Labs     02/21/24  0652   SODIUM 138   POTASSIUM 3.8   CHLORIDE 103   CO2 17*   GLUCOSE 245*   BUN 22   CREATININE 1.41*   CALCIUM 8.5                   Imaging:    I personally viewed all the available CXR and CT scan images as well as reviewed the radiology interpretation reports.    CT-HEAD W/O    (Results Pending)   CT-CSPINE WITHOUT PLUS RECONS    (Results Pending)   CL-TEMPORARY PACEMAKER INSERT    (Results Pending)   EC-ECHOCARDIOGRAM COMPLETE W/O CONT    (Results Pending)       Assessment and Plan:    * Bradycardia- (present on  admission)  Assessment & Plan  Presented with profound bradycardia with associated syncope  S/P placement of temporary transvenous pacemaker on 2/21    Aortic insufficiency  Assessment & Plan  Previous echocardiogram with moderate aortic insufficiency    Pulmonary hypertension (HCC)  Assessment & Plan  Previous echocardiogram with RVSP of 51%  WHO group 2 (systolic heart failure) and group 3 (COPD/chronic hypoxemic respiratory failure)  Continue furosemide, 40 mg daily  Repeat echocardiogram    Chronic systolic congestive heart failure (HCC)- (present on admission)  Assessment & Plan  Previous echocardiogram with LVEF of 35 to 40%  Continue furosemide, 40 mg daily  Continue losartan, 100 mg daily  Repeat echocardiogram    COPD (chronic obstructive pulmonary disease) ([facility])- (present on admission)  Assessment & Plan  No acute exacerbation  RT protocols    CKD (chronic kidney disease) stage 3, GFR 30-59 ml/min ([facility])- (present on admission)  Assessment & Plan  Monitor renal function and urine output  Avoid nephrotoxins and renal dose medications    Chronic respiratory failure with hypoxia ([facility])- (present on admission)  Assessment & Plan  On 3 L of domiciliary oxygen at night    Atrial fibrillation ([facility])- (present on admission)  Assessment & Plan  On chronic anticoagulation with apixaban  Hold anticoagulation as I anticipate he may need a permanent pacemaker  Optimize potassium and magnesium    Type 2 diabetes mellitus ([facility])- (present on admission)  Assessment & Plan  Check glycohemoglobin  Sliding scale insulin    Primary hypertension- (present on admission)  Assessment & Plan  Goal SBP less than 160  Continue amlodipine, 10 mg daily  Continue losartan, 100 mg daily    BPH (benign prostatic hyperplasia)- (present on admission)  Assessment & Plan  Continue finasteride, 5 mg daily    Dyslipidemia- (present on admission)  Assessment & Plan  Continue atorvastatin, 40 mg daily        High risk of deterioration and worsening  vital organ dysfunction and death without the above critical care interventions.    I have assessed and reassessed his blood pressure, hemodynamics and cardiovascular status.  He is at increased risk for worsening respiratory system dysfunction.    The patient is critically ill.  Critical care time = 40 minutes in directly providing and coordinating critical care and extensive data review.  No time overlap and excludes procedures.    Discussed with ER physician, RN    Richard Still MD  Pulmonary and Critical Care Medicine

## 2024-02-21 NOTE — ED PROVIDER NOTES
ED Provider Note    CHIEF COMPLAINT  Chief Complaint   Patient presents with    Irregular Heart Beat     When EMS arrived, pt was placed on the monitor and had long pauses noticed on his EKG where pt would go unresponsive for 5 seconds before answering     Fall     Pt attempted to get up and use the restroom this morning when he became nauseous and had a MGLF, + headstrike, + LOC, + blood thinners       HPI/ROS    OUTSIDE HISTORIAN(S):  EMS and the patient has syncopal episode, he had atrial fibrillation with RVR within his having significant asystolic episodes.    Oleg Cook is a 83 y.o. male who presents with complaint of irregular heartbeat.  The patient states he woke up earlier this morning, he felt somewhat lightheaded, dizzy, nausea and vomiting starting the restroom and fell down the ground.  Not lose consciousness but did hit his head.  Takes Eliquis secondary atrial fibrillation.  Currently denies any headache, loss of sensation or strength arms legs just feels dizzy intermittently.  Per EMS, the patient had significant 10-second pauses and asystole on EKG but had a normal blood pressure.  The patient would not lose consciousness but states he feels funny.  The patient states is never happened for in the past.  Patient denies recent fever, abdominal pain,    PAST MEDICAL HISTORY   has a past medical history of DVT (deep venous thrombosis) (HCC), Essential hypertension, malignant, Mixed hyperlipidemia, Nephritis and nephropathy, not specified as acute or chronic, with other specified pathological lesion in kidney, in diseases classified elsewhere, PE (pulmonary embolism), Type II or unspecified type diabetes mellitus with renal manifestations, not stated as uncontrolled(250.40), Type II or unspecified type diabetes mellitus without mention of complication, not stated as uncontrolled, Unspecified asthma(493.90), and Unspecified vitamin D deficiency (4/26/2012).    SURGICAL HISTORY  patient denies any  surgical history    FAMILY HISTORY  Family History   Problem Relation Age of Onset    Stroke Mother         brain anuerysm hemorrhage    Heart Disease Father         MI    Hypertension Father     Cancer Brother         Prostate CA       SOCIAL HISTORY  Social History     Tobacco Use    Smoking status: Former     Current packs/day: 0.00     Average packs/day: 1 pack/day for 20.0 years (20.0 ttl pk-yrs)     Types: Cigarettes     Start date: 1954     Quit date: 1974     Years since quittin.2    Smokeless tobacco: Never   Substance and Sexual Activity    Alcohol use: Yes     Alcohol/week: 0.5 oz     Types: 1 drink(s) per week    Drug use: No    Sexual activity: Yes     Partners: Female     Comment: ,retired        CURRENT MEDICATIONS  Home Medications       Reviewed by Darline Johnson (Pharmacy Tech) on 24 at 0843  Med List Status: Complete     Medication Last Dose Status   Alogliptin Benzoate 12.5 MG Tab unk Active   amLODIPine (NORVASC) 10 MG Tab unk Active   apixaban (ELIQUIS) 5mg Tab unk Active   atorvastatin (LIPITOR) 40 MG Tab unk Active   carvedilol (COREG) 12.5 MG Tab unk Active   Empagliflozin 25 MG Tab unk Active   finasteride (PROSCAR) 5 MG Tab unk Active   furosemide (LASIX) 20 MG Tab unk Active   isosorbide mononitrate SR (IMDUR) 60 MG TABLET SR 24 HR unk Active   losartan (COZAAR) 100 MG Tab unk Active   metformin (GLUCOPHAGE) 1000 MG tablet unk Active   omeprazole (PRILOSEC) 40 MG delayed-release capsule unk Active   potassium Chloride ER (K-TAB) 20 MEQ Tab CR tablet unk Active                    ALLERGIES  No Known Allergies    PHYSICAL EXAM  VITAL SIGNS: BP (!) 150/67   Pulse 67   Temp 35.9 °C (96.6 °F) (Temporal)   Resp (!) 36   Ht 1.829 m (6')   Wt 95 kg (209 lb 7 oz)   SpO2 97%   BMI 28.40 kg/m²      Nursing notes and vitals reviewed.  Constitutional: Well developed, Well nourished, No acute distress, Non-toxic appearance.   Eyes: PERRLA, EOMI,  Conjunctiva normal, No discharge.   HENT: Normocephalic, Atraumatic, moist mucous membranes, no facial edema   Cardiovascular: Normal heart rate, Normal rhythm, No murmurs, No rubs, No gallops.   Thorax & Lungs: No respiratory distress, No rales, No rhonchi, No wheezing, No chest tenderness.   Abdomen: Bowel sounds normal, Soft, No tenderness, No guarding, No rebound, No masses, No pulsatile masses.   Skin: Warm, Dry, No erythema, No rash.   Extremities: No deformity, no edema, good range of motion range of motion upper lower extremes bilaterally  Neurologic: Alert & oriented x 3, no focal abnormalities noted, acting appropriately on examination  Psychiatric: Affect normal for clinical presentation.      DIAGNOSTIC STUDIES / PROCEDURES  EKG  I have independently interpreted this EKG  Atrial fibrillation with pauses.    LABS  Results for orders placed or performed during the hospital encounter of 02/21/24   EC-ECHOCARDIOGRAM COMPLETE W/O CONT   Result Value Ref Range    Eject.Frac. MOD BP 60     Eject.Frac. MOD 4C 56.75     Eject.Frac. MOD 2C 66.89    CBC w/ Differential   Result Value Ref Range    WBC 13.7 (H) 4.8 - 10.8 K/uL    RBC 5.66 4.70 - 6.10 M/uL    Hemoglobin 16.5 14.0 - 18.0 g/dL    Hematocrit 50.1 42.0 - 52.0 %    MCV 88.5 81.4 - 97.8 fL    MCH 29.2 27.0 - 33.0 pg    MCHC 32.9 32.3 - 36.5 g/dL    RDW 48.3 35.9 - 50.0 fL    Platelet Count 234 164 - 446 K/uL    MPV 10.6 9.0 - 12.9 fL    Neutrophils-Polys 74.40 (H) 44.00 - 72.00 %    Lymphocytes 18.70 (L) 22.00 - 41.00 %    Monocytes 5.40 0.00 - 13.40 %    Eosinophils 0.20 0.00 - 6.90 %    Basophils 0.50 0.00 - 1.80 %    Immature Granulocytes 0.80 0.00 - 0.90 %    Nucleated RBC 0.00 0.00 - 0.20 /100 WBC    Neutrophils (Absolute) 10.18 (H) 1.82 - 7.42 K/uL    Lymphs (Absolute) 2.56 1.00 - 4.80 K/uL    Monos (Absolute) 0.74 0.00 - 0.85 K/uL    Eos (Absolute) 0.03 0.00 - 0.51 K/uL    Baso (Absolute) 0.07 0.00 - 0.12 K/uL    Immature Granulocytes (abs) 0.11 0.00  - 0.11 K/uL    NRBC (Absolute) 0.00 K/uL   Complete Metabolic Panel (CMP)   Result Value Ref Range    Sodium 138 135 - 145 mmol/L    Potassium 3.8 3.6 - 5.5 mmol/L    Chloride 103 96 - 112 mmol/L    Co2 17 (L) 20 - 33 mmol/L    Anion Gap 18.0 (H) 7.0 - 16.0    Glucose 245 (H) 65 - 99 mg/dL    Bun 22 8 - 22 mg/dL    Creatinine 1.41 (H) 0.50 - 1.40 mg/dL    Calcium 8.5 8.5 - 10.5 mg/dL    Correct Calcium 8.7 8.5 - 10.5 mg/dL    AST(SGOT) 16 12 - 45 U/L    ALT(SGPT) 23 2 - 50 U/L    Alkaline Phosphatase 104 (H) 30 - 99 U/L    Total Bilirubin 0.7 0.1 - 1.5 mg/dL    Albumin 3.8 3.2 - 4.9 g/dL    Total Protein 6.5 6.0 - 8.2 g/dL    Globulin 2.7 1.9 - 3.5 g/dL    A-G Ratio 1.4 g/dL   proBrain Natriuretic Peptide, NT   Result Value Ref Range    NT-proBNP 1257 (H) 0 - 125 pg/mL   Troponin - STAT Once   Result Value Ref Range    Troponin T 89 (H) 6 - 19 ng/L   Lipase   Result Value Ref Range    Lipase 27 11 - 82 U/L   Magnesium   Result Value Ref Range    Magnesium 1.6 1.5 - 2.5 mg/dL   Phosphorus   Result Value Ref Range    Phosphorus 3.6 2.5 - 4.5 mg/dL   Urinalysis    Specimen: Urine   Result Value Ref Range    Color Yellow     Character Clear     Specific Gravity 1.032 <1.035    Ph 5.0 5.0 - 8.0    Glucose >=1000 (A) Negative mg/dL    Ketones 15 (A) Negative mg/dL    Protein Negative Negative mg/dL    Bilirubin Negative Negative    Urobilinogen, Urine 0.2 Negative    Nitrite Negative Negative    Leukocyte Esterase Negative Negative    Occult Blood Negative Negative    Micro Urine Req see below    PT/INR   Result Value Ref Range    PT 18.7 (H) 12.0 - 14.6 sec    INR 1.54 (H) 0.87 - 1.13   PTT   Result Value Ref Range    APTT 29.4 24.7 - 36.0 sec   DIAGNOSTIC ALCOHOL   Result Value Ref Range    Diagnostic Alcohol <10.1 <10.1 mg/dL   ESTIMATED GFR   Result Value Ref Range    GFR (CKD-EPI) 50 (A) >60 mL/min/1.73 m 2   TROPONIN   Result Value Ref Range    Troponin T 84 (H) 6 - 19 ng/L   LACTIC ACID   Result Value Ref Range     Lactic Acid 1.8 0.5 - 2.0 mmol/L   EKG   Result Value Ref Range    Report       [facility]    Test Date:  2024  Pt Name:    JEIMY NAYLOR                   Department: ER  MRN:        [id number]                      Room:       [facility] [place]  Gender:     Male                         Technician:  :        1940                   Requested By:JUNIE GAO  Order #:    [id number]                    Reading MD: JUNIE GAO DO    Measurements  Intervals                                Axis  Rate:       91                           P:          0  IN:         0                            QRS:        114  QRSD:       168                          T:          -16  QT:         289  QTc:        356    Interpretive Statements  Atrial fibrillation  Right bundle branch block  Repol abnrm, global ischemia, diffuse leads  Compared to ECG 2022 20:19:25  Right bundle-branch block now present  Early repolarization now present  Possible ischemia now present  Ventricular premature complex(es) no longer present  Electronically  Signed On 2024 07:31:46 PST by JUNIE GAO DO     EKG   Result Value Ref Range    Report       [facility]    Test Date:  2024  Pt Name:    JEIMY NAYLOR                   Department: ER  MRN:        [id number]                      Room:       [facility]  Gender:     Male                         Technician:  :        [id number]                   Requested By:JUNIE GAO  Order #:    [id number]                    Reading MD:    Measurements  Intervals                                Axis  Rate:       82                           P:          0  IN:         0                            QRS:        111  QRSD:       160                          T:          0  QT:         421  QTc:        492    Interpretive Statements  Atrial fibrillation  Nonspecific intraventricular conduction delay  Anterior infarct,  possibly acute  Lateral leads are also involved  Compared to ECG 02/21/2024 06:50:32  Intraventricular conduction delay now present  Myocardial infarct finding now present  Right bundle-branch block no longer present  Early repolarization no andrzej martell present  Possible ischemia no longer present     POCT glucose device results   Result Value Ref Range    POC Glucose, Blood 203 (H) 65 - 99 mg/dL           COURSE & MEDICAL DECISION MAKING    ED Observation Status? No; Patient does not meet criteria for ED Observation.     INITIAL ASSESSMENT, COURSE AND PLAN  Care Narrative: This is a pleasant 83-year-old gentleman presents with atrial fibrillation RVR, new left bundle branch block.  The patient was having significant asystolic pauses that caused him to become unconscious at times lasting 10 to 15 seconds.  For this reason, emergent cardiology consultation was obtained the patient was deemed possible as ST elevation myocardial infarction.  No tachycardia the patient had persistent cardiac arrest requiring CPR.  The patient was placed on dopamine, had no evidence of significant electrolyte abnormality, no evidence of significant infectious etiology other than slight leukocytosis.  Patient has slight elevated troponin as well 84 unsure etiology of this.  The patient received rectal aspirin, he has been taking his Eliquis and will be hospitalized with ICU physician Dr. Ricci.  The patient will go to the ICU for further evaluation and management after the temporary venous pacer is placed.    Please note the patient will have the temporary pacemaker placed prior to going to head CT as he does have a history of fall and is on Eliquis.      EKG came completed at 701 AM suspicious for ST elevation myocardial infarction therefore STEMI was activated.  Dr. Castro  was bedside at 7:15 AM for evaluation.  Patient is left bundle branch block, will be going to the Cath Lab for temporary pacemaker placement evaluation.  Patient  continue is intermittent pauses of asystole becomes unconscious but short-lived response.  Patient did receive dopamine infusion as well as rectal aspirin.      7:30 AM, discussed with Dr. Ricci for hospitalization to the CICU.  Patient went to the Cath Lab approximately 7:30 AM then to CT scan for evaluation intracranial hemorrhage and cervical spine fracture that up to the unit.  Will be following CT scan results.    CRITICAL CARE  The very real possibilty of a deterioration of this patient's condition required the highest level of my preparedness for sudden, emergent intervention.  I provided critical care services, which included medication orders, frequent reevaluations of the patient's condition and response to treatment, ordering and reviewing test results, and discussing the case with various consultants.  The critical care time associated with the care of the patient was 45 minutes. Review chart for interventions. This time is exclusive of any other billable procedures.       DISPOSITION AND DISCUSSIONS      FINAL DIAGNOSIS  Atrial fibrillation with RVR  Transient asystole  Altered mental status  Close head injury  Critical care time 45 minutes    Electronically signed by: Florentino Pack D.O., 2/21/2024 6:51 AM

## 2024-02-21 NOTE — CONSULTS
Reason for Consult:  Asked by ALYSSA Curry* to see this patient with syncope    CC:   Chief Complaint   Patient presents with    Irregular Heart Beat     When EMS arrived, pt was placed on the monitor and had long pauses noticed on his EKG where pt would go unresponsive for 5 seconds before answering     Fall     Pt attempted to get up and use the restroom this morning when he became nauseous and had a MGLF, + headstrike, + LOC, + blood thinners       HPI:      83 year old man PMH HFrEF, AR, pulm htn, longstanding persistent AF, prior known LBBB, HTN, DM2, HLD presents with syncope and fall. Fall associated with head trauma. Consult for stemi. Patient denies chest pain / pressure. No complaints outside syncope and fatigue. Recurrent syncope with prolonged asystole in the ED. Started on dopamine gtt for bradycardia and pauses.     Medications / Drug list prior to admission:  No current facility-administered medications on file prior to encounter.     Current Outpatient Medications on File Prior to Encounter   Medication Sig Dispense Refill    potassium Chloride ER (K-TAB) 20 MEQ Tab CR tablet Take 20 mEq by mouth 2 times a day.      furosemide (LASIX) 20 MG Tab Take 2 Tablets by mouth every day. 60 Tablet 3    metformin (GLUCOPHAGE) 1000 MG tablet Take 1,000 mg by mouth 2 times a day.      amLODIPine (NORVASC) 10 MG Tab Take 10 mg by mouth every day.      isosorbide mononitrate SR (IMDUR) 60 MG TABLET SR 24 HR Take 120 mg by mouth every morning. 2 tablets = 120 mg.      Alogliptin Benzoate 12.5 MG Tab Take 12.5 mg by mouth every evening.      losartan (COZAAR) 100 MG Tab Take 100 mg by mouth every evening.      carvedilol (COREG) 12.5 MG Tab Take 12.5 mg by mouth 2 times a day.      atorvastatin (LIPITOR) 40 MG Tab Take 40 mg by mouth every evening.      apixaban (ELIQUIS) 5mg Tab Take 5 mg by mouth 2 times a day.      omeprazole (PRILOSEC) 40 MG delayed-release capsule Take 40 mg by mouth 2 times  daily, before breakfast and dinner.      finasteride (PROSCAR) 5 MG Tab Take 5 mg by mouth every day.      Empagliflozin 25 MG Tab Take 12.5 mg by mouth every morning. 1/2 tablet = 12.5 mg.         Current list of administered Medications:    Current Facility-Administered Medications:     DOPAMINE IN D5W 1.6-5 MG/ML-% IV SOLN, , , ,     MIDAZOLAM HCL 1 MG/ML INJ SOLN (WRAPPER), , , ,     Respiratory Therapy Consult, , Nebulization, Continuous RT, Richard Still M.D.    ondansetron (Zofran) syringe/vial injection 4 mg, 4 mg, Intravenous, Q4HRS PRN, Richard Still M.D.    ondansetron (Zofran ODT) dispertab 4 mg, 4 mg, Oral, Q4HRS PRN, Richard Still M.D. MD Alert...ICU Electrolyte Replacement per Pharmacy, , Other, PHARMACY TO DOSE, Richard Still M.D.    insulin regular (HumuLIN R,NovoLIN R) injection, 2-9 Units, Subcutaneous, 4X/DAY ACHS, 3 Units at 02/21/24 1054 **AND** POC blood glucose manual result, , , Q AC AND BEDTIME(S) **AND** NOTIFY MD and PharmD, , , Once **AND** Administer 20 grams of glucose (approximately 8 ounces of fruit juice) every 15 minutes PRN FSBG less than 70 mg/dL, , , PRN **AND** dextrose 50% (D50W) injection 25 g, 25 g, Intravenous, Q15 MIN PRN, Richard Still M.D.    magnesium sulfate IVPB premix 4 g, 4 g, Intravenous, Once, Richard Still M.D., Last Rate: 25 mL/hr at 02/21/24 1044, 4 g at 02/21/24 1044    amLODIPine (Norvasc) tablet 10 mg, 10 mg, Oral, DAILY, Richard Still M.D., 10 mg at 02/21/24 1046    atorvastatin (Lipitor) tablet 40 mg, 40 mg, Oral, Nightly, Richard Still M.D.    finasteride (Proscar) tablet 5 mg, 5 mg, Oral, DAILY, Richard Still M.D., 5 mg at 02/21/24 1046    isosorbide mononitrate SR (Imdur) tablet 120 mg, 120 mg, Oral, QAM, Richard Still M.D., 120 mg at 02/21/24 1058    losartan (Cozaar) tablet 100 mg, 100 mg, Oral, Q EVENING, Richard Still M.D.    omeprazole (PriLOSEC)  capsule 40 mg, 40 mg, Oral, Daily-0600, Richard Still M.D., 40 mg at 24 1046    [START ON 2024] aspirin EC tablet 81 mg, 81 mg, Oral, DAILY, Francois Bernard M.D.    [START ON 2024] furosemide (Lasix) tablet 20 mg, 20 mg, Oral, DAILY, Francois Bernard M.D.    Past Medical History:   Diagnosis Date    DVT (deep venous thrombosis) (HCC)     Essential hypertension, malignant     Mixed hyperlipidemia     Nephritis and nephropathy, not specified as acute or chronic, with other specified pathological lesion in kidney, in diseases classified elsewhere     PE (pulmonary embolism)     Type II or unspecified type diabetes mellitus with renal manifestations, not stated as uncontrolled(250.40)     Type II or unspecified type diabetes mellitus without mention of complication, not stated as uncontrolled     Unspecified asthma(493.90)     Unspecified vitamin D deficiency 2012       History reviewed. No pertinent surgical history.    Family History   Problem Relation Age of Onset    Stroke Mother         brain anuerysm hemorrhage    Heart Disease Father         MI    Hypertension Father     Cancer Brother         Prostate CA     Patient family history was personally reviewed, no pertinent family history to current presentation    Social History     Socioeconomic History    Marital status:      Spouse name: Not on file    Number of children: Not on file    Years of education: Not on file    Highest education level: Not on file   Occupational History    Not on file   Tobacco Use    Smoking status: Former     Current packs/day: 0.00     Average packs/day: 1 pack/day for 20.0 years (20.0 ttl pk-yrs)     Types: Cigarettes     Start date: 1954     Quit date: 1974     Years since quittin.2    Smokeless tobacco: Never   Substance and Sexual Activity    Alcohol use: Yes     Alcohol/week: 0.5 oz     Types: 1 drink(s) per week    Drug use: No    Sexual activity: Yes     Partners:  Female     Comment: ,retired    Other Topics Concern     Service Not Asked    Blood Transfusions Not Asked    Caffeine Concern Not Asked    Occupational Exposure Not Asked    Hobby Hazards Not Asked    Sleep Concern Not Asked    Stress Concern Not Asked    Weight Concern Not Asked    Special Diet Not Asked    Back Care Not Asked    Exercise Yes     Comment: walks daily 40-45 minutes    Bike Helmet Not Asked    Seat Belt Not Asked    Self-Exams Not Asked   Social History Narrative    Not on file     Social Determinants of Health     Financial Resource Strain: Not on file   Food Insecurity: Not on file   Transportation Needs: Not on file   Physical Activity: Not on file   Stress: Not on file   Social Connections: Not on file   Intimate Partner Violence: Not on file   Housing Stability: Not on file       ALLERGIES:  No Known Allergies    Review of systems:  A complete review of symptoms was reviewed with patient. This is reviewed in H&P and PMH. ALL OTHERS reviewed and negative    Physical exam:  Patient Vitals for the past 24 hrs:   BP Temp Temp src Pulse Resp SpO2 Height Weight   02/21/24 0915 (!) 151/72 -- -- 60 16 98 % -- 95 kg (209 lb 7 oz)   02/21/24 0911 (!) 140/67 36.1 °C (96.9 °F) Temporal 60 (!) 8 96 % -- --   02/21/24 0900 (!) 140/67 -- -- (!) 59 (!) 23 96 % -- --   02/21/24 0845 -- -- -- 60 20 96 % -- --   02/21/24 0830 -- -- -- 72 17 97 % -- --   02/21/24 0815 127/60 -- -- 74 17 88 % -- --   02/21/24 0655 (!) 171/90 35.8 °C (96.5 °F) Temporal 80 (!) 21 97 % -- --   02/21/24 0654 (!) 171/90 -- -- 80 -- 97 % -- --   02/21/24 0652 -- -- -- -- -- -- 1.829 m (6') 93.4 kg (206 lb)   02/21/24 0651 (!) 198/91 -- -- 79 (!) 21 98 % -- --     General: No acute distress.   EYES: no jaundice  HEENT: OP clear   Neck: No bruits No JVD.   CVS: irreg. S1 + S2. No M/R/G. No edema.  Resp: CTAB. No wheezing or crackles/rhonchi.  Abdomen: Soft, NT, ND,  Skin: Grossly nothing acute no obvious  rashes  Neurological: Alert, Moves all extremities, no cranial nerve defects on limited exam  Extremities: Pulse 2+ in b/l LE. No cyanosis.     Data:  Laboratory studies personally reviewed by me:  Recent Results (from the past 24 hour(s))   EKG    Collection Time: 24  6:50 AM   Result Value Ref Range    Report       [facility] Emergency Dept.    Test Date:  2024  Pt Name:    JEIMY NAYLOR                   Department: ER  MRN:        [id number]                      Room:       [facility] [place]  Gender:     Male                         Technician:  :        1940                   Requested By:JUNIE GAO  Order #:    [id number]                    Reading MD: JUNIE GAO DO    Measurements  Intervals                                Axis  Rate:       91                           P:          0  IA:         0                            QRS:        114  QRSD:       168                          T:          -16  QT:         289  QTc:        356    Interpretive Statements  Atrial fibrillation  Right bundle branch block  Repol abnrm, global ischemia, diffuse leads  Compared to ECG 2022 20:19:25  Right bundle-branch block now present  Early repolarization now present  Possible ischemia now present  Ventricular premature complex(es) no longer present  Electronically  Signed On 2024 07:31:46 PST by JUNIE GAO DO     CBC w/ Differential    Collection Time: 24  6:52 AM   Result Value Ref Range    WBC 13.7 (H) 4.8 - 10.8 K/uL    RBC 5.66 4.70 - 6.10 M/uL    Hemoglobin 16.5 14.0 - 18.0 g/dL    Hematocrit 50.1 42.0 - 52.0 %    MCV 88.5 81.4 - 97.8 fL    MCH 29.2 27.0 - 33.0 pg    MCHC 32.9 32.3 - 36.5 g/dL    RDW 48.3 35.9 - 50.0 fL    Platelet Count 234 164 - 446 K/uL    MPV 10.6 9.0 - 12.9 fL    Neutrophils-Polys 74.40 (H) 44.00 - 72.00 %    Lymphocytes 18.70 (L) 22.00 - 41.00 %    Monocytes 5.40 0.00 - 13.40 %    Eosinophils 0.20 0.00 - 6.90 %    Basophils  0.50 0.00 - 1.80 %    Immature Granulocytes 0.80 0.00 - 0.90 %    Nucleated RBC 0.00 0.00 - 0.20 /100 WBC    Neutrophils (Absolute) 10.18 (H) 1.82 - 7.42 K/uL    Lymphs (Absolute) 2.56 1.00 - 4.80 K/uL    Monos (Absolute) 0.74 0.00 - 0.85 K/uL    Eos (Absolute) 0.03 0.00 - 0.51 K/uL    Baso (Absolute) 0.07 0.00 - 0.12 K/uL    Immature Granulocytes (abs) 0.11 0.00 - 0.11 K/uL    NRBC (Absolute) 0.00 K/uL   Complete Metabolic Panel (CMP)    Collection Time: 02/21/24  6:52 AM   Result Value Ref Range    Sodium 138 135 - 145 mmol/L    Potassium 3.8 3.6 - 5.5 mmol/L    Chloride 103 96 - 112 mmol/L    Co2 17 (L) 20 - 33 mmol/L    Anion Gap 18.0 (H) 7.0 - 16.0    Glucose 245 (H) 65 - 99 mg/dL    Bun 22 8 - 22 mg/dL    Creatinine 1.41 (H) 0.50 - 1.40 mg/dL    Calcium 8.5 8.5 - 10.5 mg/dL    Correct Calcium 8.7 8.5 - 10.5 mg/dL    AST(SGOT) 16 12 - 45 U/L    ALT(SGPT) 23 2 - 50 U/L    Alkaline Phosphatase 104 (H) 30 - 99 U/L    Total Bilirubin 0.7 0.1 - 1.5 mg/dL    Albumin 3.8 3.2 - 4.9 g/dL    Total Protein 6.5 6.0 - 8.2 g/dL    Globulin 2.7 1.9 - 3.5 g/dL    A-G Ratio 1.4 g/dL   proBrain Natriuretic Peptide, NT    Collection Time: 02/21/24  6:52 AM   Result Value Ref Range    NT-proBNP 1257 (H) 0 - 125 pg/mL   Troponin - STAT Once    Collection Time: 02/21/24  6:52 AM   Result Value Ref Range    Troponin T 89 (H) 6 - 19 ng/L   Lipase    Collection Time: 02/21/24  6:52 AM   Result Value Ref Range    Lipase 27 11 - 82 U/L   Magnesium    Collection Time: 02/21/24  6:52 AM   Result Value Ref Range    Magnesium 1.6 1.5 - 2.5 mg/dL   Phosphorus    Collection Time: 02/21/24  6:52 AM   Result Value Ref Range    Phosphorus 3.6 2.5 - 4.5 mg/dL   PT/INR    Collection Time: 02/21/24  6:52 AM   Result Value Ref Range    PT 18.7 (H) 12.0 - 14.6 sec    INR 1.54 (H) 0.87 - 1.13   PTT    Collection Time: 02/21/24  6:52 AM   Result Value Ref Range    APTT 29.4 24.7 - 36.0 sec   DIAGNOSTIC ALCOHOL    Collection Time: 02/21/24  6:52 AM    Result Value Ref Range    Diagnostic Alcohol <10.1 <10.1 mg/dL   ESTIMATED GFR    Collection Time: 24  6:52 AM   Result Value Ref Range    GFR (CKD-EPI) 50 (A) >60 mL/min/1.73 m 2   EKG    Collection Time: 24  7:01 AM   Result Value Ref Range    Report       [facility] Emergency Dept.    Test Date:  2024  Pt Name:    JEIMY NAYLOR                   Department: ER  MRN:        [id number]                      Room:        10  Gender:     Male                         Technician:  :        1940                   Requested By:JUNIE GAO  Order #:    [id number]                    Reading MD:    Measurements  Intervals                                Axis  Rate:       82                           P:          0  OR:         0                            QRS:        111  QRSD:       160                          T:          0  QT:         421  QTc:        492    Interpretive Statements  Atrial fibrillation  Nonspecific intraventricular conduction delay  Anterior infarct, possibly acute  Lateral leads are also involved  Compared to ECG 2024 06:50:32  Intraventricular conduction delay now present  Myocardial infarct finding now present  Right bundle-branch block no longer present  Early repolarization no andrzej martell present  Possible ischemia no longer present     EC-ECHOCARDIOGRAM COMPLETE W/O CONT    Collection Time: 24 10:12 AM   Result Value Ref Range    Eject.Frac. MOD BP 60     Eject.Frac. MOD 4C 56.75     Eject.Frac. MOD 2C 66.89    POCT glucose device results    Collection Time: 24 10:41 AM   Result Value Ref Range    POC Glucose, Blood 203 (H) 65 - 99 mg/dL   TROPONIN    Collection Time: 24 10:49 AM   Result Value Ref Range    Troponin T 84 (H) 6 - 19 ng/L   LACTIC ACID    Collection Time: 24 10:49 AM   Result Value Ref Range    Lactic Acid 1.8 0.5 - 2.0 mmol/L       EKG 24 AF, prolonged pause, PVCs, LBBB - left bundle known since at  least 7/27/21    All pertinent features of laboratory and imaging reviewed including primary images where applicable    TTE 2/21/24  CONCLUSIONS  Low normal left ventricular systolic function. The ejection fraction is   visually estimated to be 50%.   Normal right ventricular size and systolic function.  Mild aortic insufficiency. Pressure half time is 694 msec.  Mild to moderate mitral regurgitation.   The ascending aorta diameter is 3.6 cm.  Compared to the prior study on 01/21/22, LVEF appears improved. No   other significant changes when compared side by side.      Principal Problem:    Bradycardia (POA: Yes)  Active Problems:    Hx of pulmonary embolus (POA: Yes)    Primary hypertension (POA: Yes)    Type 2 diabetes mellitus (HCC) (POA: Yes)      Overview: 3/16/11: Microalbumin/Cr ratio: 13    Dyslipidemia (POA: Yes)      Overview: 3/16/11: 126/91/42/66    Atrial fibrillation (HCC) (POA: Yes)    Chronic respiratory failure with hypoxia (HCC) (POA: Yes)    CKD (chronic kidney disease) stage 3, GFR 30-59 ml/min (HCC) (POA: Yes)    BPH (benign prostatic hyperplasia) (POA: Yes)    JULIA (obstructive sleep apnea) (POA: Yes)    Coronary artery disease involving native coronary artery of native heart (POA: Yes)    COPD (chronic obstructive pulmonary disease) (HCC) (POA: Yes)    Chronic systolic congestive heart failure (HCC) (POA: Yes)    Pulmonary hypertension (HCC) (POA: Yes)    Aortic insufficiency (POA: Yes)    GERD (gastroesophageal reflux disease) (POA: Yes)      Overview: -Continue home omeprazole    Elevated troponin (POA: Yes)    High anion gap metabolic acidosis (POA: Yes)  Resolved Problems:    * No resolved hospital problems. *      Assessment / Plan:  83 year old man PMH HFrEF, AR, pulm htn, longstanding persistent AF, prior known LBBB, HTN, DM2, HLD presents with syncope and fall found AF with SVR and prolonged pause.    -s/p TVP  -TTE showing HFrecEF. Continue HFOMT.   -EP for PPM    I personally  discussed his case with Dr tSill     It is my pleasure to participate in the care of Mr. Cook.  Please do not hesitate to contact me with questions or concerns.    Paulo Aguirre MD  Cardiologist [facility]    Oleg Cook is critically ill and he is at high risk for clinical deterioration, worsening vital organ dysfunction, and death without the above critical care interventions.  Critical care time 45 minutes. This time was spent at the bedside evaluating the patient's chart, their labs,   imaging, physical exam and discussion with   as well as the bedside nurse, family and formulating an assessment and plan.

## 2024-02-21 NOTE — ED TRIAGE NOTES
Chief Complaint   Patient presents with    Irregular Heart Beat     When EMS arrived, pt was placed on the monitor and had long pauses noticed on his EKG where pt would go unresponsive for 5 seconds before answering     Fall     Pt attempted to get up and use the restroom this morning when he became nauseous and had a MGLF, + headstrike, + LOC, + blood thinners     Pt BIB EMS from home for above complaints, A+O x 4, GCS 15    ERP, RT, pharmacy and assist RNS at bedside

## 2024-02-21 NOTE — PROGRESS NOTES
JORDAN Ulloa, at bedside to talk to patient. Plan is for permanent pacemaker placement this afternoon.

## 2024-02-21 NOTE — ASSESSMENT & PLAN NOTE
Previous echocardiogram with LVEF of 35 to 40%  Continue furosemide, 40 mg daily  Continue losartan, 100 mg daily  Repeat echocardiogram

## 2024-02-21 NOTE — ASSESSMENT & PLAN NOTE
On chronic anticoagulation with apixaban  Hold anticoagulation as I anticipate he may need a permanent pacemaker  Optimize potassium and magnesium

## 2024-02-21 NOTE — ASSESSMENT & PLAN NOTE
Presented with profound bradycardia with associated syncope  S/P placement of temporary transvenous pacemaker on 2/21

## 2024-02-21 NOTE — ASSESSMENT & PLAN NOTE
Syncope with bradycardia and apparently 10-second pause  He had been on Coreg though this was not new for him.  He had a emergent right-sided temporary catheter placed and Eliquis was held.  On 2/22/2024 he had a permanent pacemaker on the left side.  He is currently in a paced rhythm

## 2024-02-21 NOTE — PROCEDURES
Name of the Procedure: Temporary pacer wire placement    Indication: Symptomatic bradyardia    Referring MD: Dr Carla MD      Procedure in Detail:    After informed consent, patient was brought to the cardiac catheterization lab where the right side of the neck was prepped in sterile fashion. Right internal jugular vein was accessed with 6 Fr sheath under ultrasound guidance.  A balloon temporary pacer wire was placed in right ventricular apex.  Threshold was measured less than 1 mA and secured using a suture.      Complications: none    EBL < 10 cc    Specimens: none    I supervised moderate sedation over a trained independent nursing staff.  Sedation start time : 07:40 End time: 07:51    Electronically signed by   Joaquin Olson M.D., MD  2/21/2024  8:02 AM

## 2024-02-21 NOTE — PROGRESS NOTES
4 Eyes Skin Assessment Completed by LARON Jim and LARON Rossi.    Head WDL  Ears WDL  Nose WDL  Mouth WDL  Neck WDL  Breast/Chest WDL  Shoulder Blades WDL  Spine WDL  (R) Arm/Elbow/Hand Bruising  (L) Arm/Elbow/Hand Bruising  Abdomen WDL  Groin Redness, excoriation          Scrotum/Coccyx/Buttocks WDL  (R) Leg WDL  (L) Leg WDL  (R) Heel/Foot/Toe: cracked, dry, calloused  (L) Heel/Foot/Toe: cracked, dry, calloused          Devices In Places ECG, Blood Pressure Cuff, and Pulse Ox      Interventions In Place Pillows and Low Air Loss Mattress    Possible Skin Injury No    Pictures Uploaded Into Epic yes  Wound Consult Placed N/A  RN Wound Prevention Protocol Ordered No

## 2024-02-21 NOTE — ED NOTES
Pt taken to Cath Lab by Cath Lab RN and ER RN Alesha, pt on cardiac monitor and pads, placed on full portable oxygen tank, pt belongings and paperwork at bedside

## 2024-02-21 NOTE — ASSESSMENT & PLAN NOTE
Previous echocardiogram had revealed a low ejection fraction of 35% though repeat echocardiogram today shows a normal ejection fraction of 50%.  Outpatient goal-directed medical therapy will be restarted accordingly

## 2024-02-21 NOTE — PROGRESS NOTES
Patient brought to T616 with Cath lab RNs. Report received from Sadi, Ray. Patient attached to the monitor with a heart rhythm showing afib 60s-80s, no pacing required. Patient arrived with a temporary pacer with settings of 60/5/.2 (VVI setting). Temp pacer is marked at 36cm. Patient is alert and denies any headache or blurry vision. Patient to go to CT shortly after MD has assessed him. Dr. Still at bedside assessing patient. Patient arrives saline lock; dopamine turned off in cath lab per report.

## 2024-02-22 ENCOUNTER — APPOINTMENT (OUTPATIENT)
Dept: RADIOLOGY | Facility: MEDICAL CENTER | Age: 84
DRG: 242 | End: 2024-02-22
Attending: INTERNAL MEDICINE
Payer: MEDICARE

## 2024-02-22 ENCOUNTER — APPOINTMENT (OUTPATIENT)
Dept: CARDIOLOGY | Facility: MEDICAL CENTER | Age: 84
DRG: 242 | End: 2024-02-22
Attending: NURSE PRACTITIONER
Payer: MEDICARE

## 2024-02-22 PROBLEM — I27.20 PULMONARY HYPERTENSION (HCC): Status: RESOLVED | Noted: 2024-02-21 | Resolved: 2024-02-22

## 2024-02-22 PROBLEM — R79.89 ELEVATED TROPONIN: Status: RESOLVED | Noted: 2024-02-21 | Resolved: 2024-02-22

## 2024-02-22 PROBLEM — K21.9 GERD (GASTROESOPHAGEAL REFLUX DISEASE): Status: RESOLVED | Noted: 2024-02-21 | Resolved: 2024-02-22

## 2024-02-22 PROBLEM — J44.9 COPD (CHRONIC OBSTRUCTIVE PULMONARY DISEASE) (HCC): Status: RESOLVED | Noted: 2022-01-21 | Resolved: 2024-02-22

## 2024-02-22 PROBLEM — I35.1 AORTIC INSUFFICIENCY: Status: RESOLVED | Noted: 2024-02-21 | Resolved: 2024-02-22

## 2024-02-22 LAB
ANION GAP SERPL CALC-SCNC: 14 MMOL/L (ref 7–16)
BASOPHILS # BLD AUTO: 0.7 % (ref 0–1.8)
BASOPHILS # BLD: 0.07 K/UL (ref 0–0.12)
BUN SERPL-MCNC: 18 MG/DL (ref 8–22)
CALCIUM SERPL-MCNC: 8.9 MG/DL (ref 8.5–10.5)
CHLORIDE SERPL-SCNC: 105 MMOL/L (ref 96–112)
CO2 SERPL-SCNC: 20 MMOL/L (ref 20–33)
CREAT SERPL-MCNC: 1.18 MG/DL (ref 0.5–1.4)
EKG IMPRESSION: NORMAL
EOSINOPHIL # BLD AUTO: 0.19 K/UL (ref 0–0.51)
EOSINOPHIL NFR BLD: 1.9 % (ref 0–6.9)
ERYTHROCYTE [DISTWIDTH] IN BLOOD BY AUTOMATED COUNT: 50.1 FL (ref 35.9–50)
EST. AVERAGE GLUCOSE BLD GHB EST-MCNC: 163 MG/DL
GFR SERPLBLD CREATININE-BSD FMLA CKD-EPI: 62 ML/MIN/1.73 M 2
GLUCOSE BLD STRIP.AUTO-MCNC: 152 MG/DL (ref 65–99)
GLUCOSE BLD STRIP.AUTO-MCNC: 155 MG/DL (ref 65–99)
GLUCOSE SERPL-MCNC: 148 MG/DL (ref 65–99)
HBA1C MFR BLD: 7.3 % (ref 4–5.6)
HCT VFR BLD AUTO: 43.6 % (ref 42–52)
HGB BLD-MCNC: 14.4 G/DL (ref 14–18)
IMM GRANULOCYTES # BLD AUTO: 0.06 K/UL (ref 0–0.11)
IMM GRANULOCYTES NFR BLD AUTO: 0.6 % (ref 0–0.9)
LYMPHOCYTES # BLD AUTO: 1.89 K/UL (ref 1–4.8)
LYMPHOCYTES NFR BLD: 18.5 % (ref 22–41)
MAGNESIUM SERPL-MCNC: 2.6 MG/DL (ref 1.5–2.5)
MCH RBC QN AUTO: 29.4 PG (ref 27–33)
MCHC RBC AUTO-ENTMCNC: 33 G/DL (ref 32.3–36.5)
MCV RBC AUTO: 89 FL (ref 81.4–97.8)
MONOCYTES # BLD AUTO: 1.09 K/UL (ref 0–0.85)
MONOCYTES NFR BLD AUTO: 10.7 % (ref 0–13.4)
NEUTROPHILS # BLD AUTO: 6.9 K/UL (ref 1.82–7.42)
NEUTROPHILS NFR BLD: 67.6 % (ref 44–72)
NRBC # BLD AUTO: 0 K/UL
NRBC BLD-RTO: 0 /100 WBC (ref 0–0.2)
PHOSPHATE SERPL-MCNC: 2.8 MG/DL (ref 2.5–4.5)
PLATELET # BLD AUTO: 186 K/UL (ref 164–446)
PMV BLD AUTO: 10.2 FL (ref 9–12.9)
POTASSIUM SERPL-SCNC: 4.1 MMOL/L (ref 3.6–5.5)
RBC # BLD AUTO: 4.9 M/UL (ref 4.7–6.1)
SODIUM SERPL-SCNC: 139 MMOL/L (ref 135–145)
WBC # BLD AUTO: 10.2 K/UL (ref 4.8–10.8)

## 2024-02-22 PROCEDURE — 770020 HCHG ROOM/CARE - TELE (206)

## 2024-02-22 PROCEDURE — 84100 ASSAY OF PHOSPHORUS: CPT

## 2024-02-22 PROCEDURE — 02HK3JZ INSERTION OF PACEMAKER LEAD INTO RIGHT VENTRICLE, PERCUTANEOUS APPROACH: ICD-10-PCS | Performed by: INTERNAL MEDICINE

## 2024-02-22 PROCEDURE — 82962 GLUCOSE BLOOD TEST: CPT | Mod: 91

## 2024-02-22 PROCEDURE — 71045 X-RAY EXAM CHEST 1 VIEW: CPT

## 2024-02-22 PROCEDURE — 700111 HCHG RX REV CODE 636 W/ 250 OVERRIDE (IP)

## 2024-02-22 PROCEDURE — 99153 MOD SED SAME PHYS/QHP EA: CPT

## 2024-02-22 PROCEDURE — 700101 HCHG RX REV CODE 250: Performed by: INTERNAL MEDICINE

## 2024-02-22 PROCEDURE — 02H63JZ INSERTION OF PACEMAKER LEAD INTO RIGHT ATRIUM, PERCUTANEOUS APPROACH: ICD-10-PCS | Performed by: INTERNAL MEDICINE

## 2024-02-22 PROCEDURE — 99223 1ST HOSP IP/OBS HIGH 75: CPT | Mod: 57 | Performed by: INTERNAL MEDICINE

## 2024-02-22 PROCEDURE — A9270 NON-COVERED ITEM OR SERVICE: HCPCS | Performed by: STUDENT IN AN ORGANIZED HEALTH CARE EDUCATION/TRAINING PROGRAM

## 2024-02-22 PROCEDURE — 80048 BASIC METABOLIC PNL TOTAL CA: CPT

## 2024-02-22 PROCEDURE — 700111 HCHG RX REV CODE 636 W/ 250 OVERRIDE (IP): Performed by: INTERNAL MEDICINE

## 2024-02-22 PROCEDURE — 99233 SBSQ HOSP IP/OBS HIGH 50: CPT | Mod: FS | Performed by: INTERNAL MEDICINE

## 2024-02-22 PROCEDURE — 85025 COMPLETE CBC W/AUTO DIFF WBC: CPT

## 2024-02-22 PROCEDURE — 33225 L VENTRIC PACING LEAD ADD-ON: CPT | Performed by: INTERNAL MEDICINE

## 2024-02-22 PROCEDURE — A9270 NON-COVERED ITEM OR SERVICE: HCPCS | Performed by: INTERNAL MEDICINE

## 2024-02-22 PROCEDURE — 33208 INSRT HEART PM ATRIAL & VENT: CPT | Mod: KX | Performed by: INTERNAL MEDICINE

## 2024-02-22 PROCEDURE — 700117 HCHG RX CONTRAST REV CODE 255: Performed by: INTERNAL MEDICINE

## 2024-02-22 PROCEDURE — 700102 HCHG RX REV CODE 250 W/ 637 OVERRIDE(OP): Performed by: INTERNAL MEDICINE

## 2024-02-22 PROCEDURE — 93005 ELECTROCARDIOGRAM TRACING: CPT | Performed by: INTERNAL MEDICINE

## 2024-02-22 PROCEDURE — 99152 MOD SED SAME PHYS/QHP 5/>YRS: CPT | Performed by: INTERNAL MEDICINE

## 2024-02-22 PROCEDURE — 83036 HEMOGLOBIN GLYCOSYLATED A1C: CPT

## 2024-02-22 PROCEDURE — 99223 1ST HOSP IP/OBS HIGH 75: CPT | Mod: AI | Performed by: HOSPITALIST

## 2024-02-22 PROCEDURE — 700101 HCHG RX REV CODE 250

## 2024-02-22 PROCEDURE — 99233 SBSQ HOSP IP/OBS HIGH 50: CPT | Mod: GC | Performed by: INTERNAL MEDICINE

## 2024-02-22 PROCEDURE — 700102 HCHG RX REV CODE 250 W/ 637 OVERRIDE(OP): Performed by: STUDENT IN AN ORGANIZED HEALTH CARE EDUCATION/TRAINING PROGRAM

## 2024-02-22 PROCEDURE — 0JH607Z INSERTION OF CARDIAC RESYNCHRONIZATION PACEMAKER PULSE GENERATOR INTO CHEST SUBCUTANEOUS TISSUE AND FASCIA, OPEN APPROACH: ICD-10-PCS | Performed by: INTERNAL MEDICINE

## 2024-02-22 PROCEDURE — 83735 ASSAY OF MAGNESIUM: CPT

## 2024-02-22 PROCEDURE — 93010 ELECTROCARDIOGRAM REPORT: CPT | Performed by: INTERNAL MEDICINE

## 2024-02-22 RX ORDER — BUPIVACAINE HYDROCHLORIDE 5 MG/ML
INJECTION, SOLUTION EPIDURAL; INTRACAUDAL
Status: COMPLETED
Start: 2024-02-22 | End: 2024-02-22

## 2024-02-22 RX ORDER — CEFAZOLIN SODIUM 1 G/3ML
INJECTION, POWDER, FOR SOLUTION INTRAMUSCULAR; INTRAVENOUS
Status: COMPLETED
Start: 2024-02-22 | End: 2024-02-22

## 2024-02-22 RX ORDER — MIDAZOLAM HYDROCHLORIDE 1 MG/ML
INJECTION INTRAMUSCULAR; INTRAVENOUS
Status: COMPLETED
Start: 2024-02-22 | End: 2024-02-22

## 2024-02-22 RX ORDER — LIDOCAINE HYDROCHLORIDE 20 MG/ML
INJECTION, SOLUTION INFILTRATION; PERINEURAL
Status: COMPLETED
Start: 2024-02-22 | End: 2024-02-22

## 2024-02-22 RX ADMIN — MIDAZOLAM HYDROCHLORIDE 2 MG: 1 INJECTION, SOLUTION INTRAMUSCULAR; INTRAVENOUS at 15:05

## 2024-02-22 RX ADMIN — OMEPRAZOLE 40 MG: 20 CAPSULE, DELAYED RELEASE ORAL at 05:29

## 2024-02-22 RX ADMIN — FUROSEMIDE 20 MG: 20 TABLET ORAL at 05:30

## 2024-02-22 RX ADMIN — LOSARTAN POTASSIUM 100 MG: 50 TABLET, FILM COATED ORAL at 17:22

## 2024-02-22 RX ADMIN — CEFAZOLIN 2000 MG: 1 INJECTION, POWDER, FOR SOLUTION INTRAMUSCULAR; INTRAVENOUS at 14:30

## 2024-02-22 RX ADMIN — LIDOCAINE HYDROCHLORIDE: 20 INJECTION, SOLUTION INFILTRATION; PERINEURAL at 14:31

## 2024-02-22 RX ADMIN — ASPIRIN 81 MG: 81 TABLET, COATED ORAL at 05:30

## 2024-02-22 RX ADMIN — ATORVASTATIN CALCIUM 40 MG: 40 TABLET, FILM COATED ORAL at 22:09

## 2024-02-22 RX ADMIN — FINASTERIDE 5 MG: 5 TABLET, FILM COATED ORAL at 05:30

## 2024-02-22 RX ADMIN — BUPIVACAINE HYDROCHLORIDE: 5 INJECTION, SOLUTION EPIDURAL; INTRACAUDAL at 14:31

## 2024-02-22 RX ADMIN — IOHEXOL 15 ML: 350 INJECTION, SOLUTION INTRAVENOUS at 15:06

## 2024-02-22 RX ADMIN — Medication 1 LOZENGE: at 23:00

## 2024-02-22 RX ADMIN — FENTANYL CITRATE 100 MCG: 50 INJECTION, SOLUTION INTRAMUSCULAR; INTRAVENOUS at 15:05

## 2024-02-22 RX ADMIN — ISOSORBIDE MONONITRATE 120 MG: 30 TABLET, EXTENDED RELEASE ORAL at 06:00

## 2024-02-22 RX ADMIN — WATER 2 G: 100 INJECTION, SOLUTION INTRAVENOUS at 23:06

## 2024-02-22 RX ADMIN — INSULIN HUMAN 3 UNITS: 100 INJECTION, SOLUTION PARENTERAL at 22:12

## 2024-02-22 RX ADMIN — AMLODIPINE BESYLATE 10 MG: 10 TABLET ORAL at 05:29

## 2024-02-22 ASSESSMENT — ENCOUNTER SYMPTOMS
DIZZINESS: 0
SHORTNESS OF BREATH: 0
CHOKING: 0
COUGH: 0
APNEA: 0
CHILLS: 0
STRIDOR: 0
WHEEZING: 0
FEVER: 0
CHEST TIGHTNESS: 0

## 2024-02-22 ASSESSMENT — FIBROSIS 4 INDEX
FIB4 SCORE: 1.154849062469952533
FIB4 SCORE: 1.45

## 2024-02-22 ASSESSMENT — PAIN DESCRIPTION - PAIN TYPE
TYPE: ACUTE PAIN
TYPE: ACUTE PAIN

## 2024-02-22 NOTE — PROGRESS NOTES
[facility] Progress Note      Admit Date: 2/21/2024    Resident(s): Alexandra Reveles M.D.   Attending:  TRINO PANIAGUA/ Dr. Patrice Wilder     Patient ID:    Name:  Oleg Cook   YOB: 1940  Age:  83 y.o.  male   MRN:  [id number]    Hospital Course (carried forward and updated):  Oleg Cook is a 83 y.o. male with PMH significant for persistent HFrEF, AF (on eliquis), DVT/PE (2011), pulm HTN, prior known LBBB, HTN, DM2, CKD3a, BPH, JULIA, and HLD admitted on 2/21/24 for multiple syncopal episodes secondary to prolonged pause.    2/21: S/p cath lab for R IJ temp pacemaker placement for symptomatic bradycardia.   2/22: PPM today. Time pending.    Consultants:  Critical Care  Electrophysiology  Cardiology     Interval Events:    NEURO:   Awake, alert, and answering question appropriately.  CARDIOVASC:  Afib with LBBB 70-100bpm, partially paced on telemetry.  RESPIRATORY:  On low flow oxygen.  GI/NUTRITION:  NPO prior to PPM placement.  RENAL/FLUID/LYTES: GFR 62. Cr 1.18. UOP 1450 mL.  HEME/ONC:   HH 14.4/43.6. Plt 186.   INFECTIOUS D:  None  ENDOCRINE:   A1C 7.3    Vitals Range last 24h:  Temp:  [35.9 °C (96.6 °F)-36.7 °C (98 °F)] 36.1 °C (97 °F)  Pulse:  [59-87] 63  Resp:  [8-48] 15  BP: (110-159)/(57-86) 151/74  SpO2:  [91 %-98 %] 95 %      Intake/Output Summary (Last 24 hours) at 2/22/2024 0822  Last data filed at 2/22/2024 0600  Gross per 24 hour   Intake 806.63 ml   Output 1450 ml   Net -643.37 ml        Review of Systems   All other systems reviewed and are negative.      PHYSICAL EXAM:  Vitals:    02/22/24 0300 02/22/24 0400 02/22/24 0500 02/22/24 0600   BP: 126/64 (!) 140/75 135/68 (!) 151/74   Pulse: 65 71 62 63   Resp: 17 18 17 15   Temp:  36.1 °C (97 °F)     TempSrc:  Temporal     SpO2: 92% 95% 94% 95%   Weight:    95 kg (209 lb 7 oz)   Height:        Body mass index is 28.4 kg/m².    O2 therapy: Pulse Oximetry: 95 %, O2 (LPM): 1, O2 Delivery Device: Silicone Nasal Cannula         Physical  Exam  Vitals and nursing note reviewed.   Constitutional:       Appearance: Normal appearance.   Cardiovascular:      Rate and Rhythm: Bradycardia present. Rhythm irregular.   Pulmonary:      Effort: Pulmonary effort is normal. No respiratory distress.      Breath sounds: Normal breath sounds.   Abdominal:      General: Abdomen is flat. Bowel sounds are normal.      Palpations: Abdomen is soft.      Tenderness: There is no abdominal tenderness.   Musculoskeletal:         General: No swelling.   Skin:     General: Skin is warm and dry.      Capillary Refill: Capillary refill takes less than 2 seconds.   Neurological:      General: No focal deficit present.      Mental Status: He is alert and oriented to person, place, and time.   Psychiatric:         Behavior: Behavior normal.           Recent Labs     02/21/24  0652 02/22/24  0407   SODIUM 138 139   POTASSIUM 3.8 4.1   CHLORIDE 103 105   CO2 17* 20   BUN 22 18   CREATININE 1.41* 1.18   MAGNESIUM 1.6 2.6*   PHOSPHORUS 3.6 2.8   CALCIUM 8.5 8.9     Recent Labs     02/21/24  0652 02/22/24  0407   ALTSGPT 23  --    ASTSGOT 16  --    ALKPHOSPHAT 104*  --    TBILIRUBIN 0.7  --    LIPASE 27  --    GLUCOSE 245* 148*     Recent Labs     02/21/24  0652 02/22/24  0407   RBC 5.66 4.90   HEMOGLOBIN 16.5 14.4   HEMATOCRIT 50.1 43.6   PLATELETCT 234 186   PROTHROMBTM 18.7*  --    APTT 29.4  --    INR 1.54*  --      Recent Labs     02/21/24  0652 02/22/24  0407   WBC 13.7* 10.2   NEUTSPOLYS 74.40* 67.60   LYMPHOCYTES 18.70* 18.50*   MONOCYTES 5.40 10.70   EOSINOPHILS 0.20 1.90   BASOPHILS 0.50 0.70   ASTSGOT 16  --    ALTSGPT 23  --    ALKPHOSPHAT 104*  --    TBILIRUBIN 0.7  --        Meds:   Respiratory Therapy Consult        ondansetron  4 mg      ondansetron  4 mg      MD Alert...Adult ICU Electrolyte Replacement per Pharmacy        insulin regular  2-9 Units      And    dextrose bolus  25 g      amLODIPine  10 mg      atorvastatin  40 mg      finasteride  5 mg      isosorbide  mononitrate SR  120 mg      losartan  100 mg      omeprazole  40 mg      aspirin  81 mg      furosemide  20 mg      menthol  1 Lozenge          Procedures:  R IJ temp pacemaker 2/21/24  PPM 2/22/2024    Imaging:  EC-ECHOCARDIOGRAM COMPLETE W/O CONT   Final Result      CT-CSPINE WITHOUT PLUS RECONS   Final Result      Degenerative changes of the cervical spine without acute fracture or malalignment.      CT-HEAD W/O   Final Result      1.  No evidence of acute territorial infarct, intracranial hemorrhage or mass lesion.   2.  Mild diffuse cerebral substance loss.   3.  Mild microangiopathic ischemic change versus demyelination or gliosis.   4.  Partially calcified inspissated material within the right sphenoid sinus which may related to chronic and/or fungal infection.         CL-TEMPORARY PACEMAKER INSERT    (Results Pending)   CL-BIV PERMANENT PACEMAKER GENERATOR CHANGE    (Results Pending)       ASSESSEMENT and PLAN:  Oleg Cook is a 83 y.o. male with PMH significant for persistent HFrEF, AF (on eliquis), DVT/PE (2011), pulm HTN, prior known LBBB, HTN, DM2, CKD3a, BPH, JULIA, and HLD admitted on 2/21/24 for multiple syncopal episodes secondary to prolonged pause.    * Bradycardia- (present on admission)  Assessment & Plan  Presented with bradycardia and significant pauses with resulting syncopal episodes. RIJ pacer wire placed.  Hold home carvedilol   EP consulted and recommends PPM today. Time TBD.  Hold Eliquis  NPO    High anion gap metabolic acidosis- (present on admission)  Assessment & Plan  Resolved  LA WNL    Chronic systolic congestive heart failure (HCC)- (present on admission)  Assessment & Plan  TTE 01/21/22 showing EF 35-40%, mod dil LA, RVSP 51mmHg. BNP 1257  Hold home carvedilol 12.5mg bid  Continue home losartan, isosorbide mononitrate, and Lasix  Pending TTE    Chronic respiratory failure with hypoxia (HCC)- (present on admission)  Assessment & Plan  Uses 3L at night in lieu of CPAP for  JULIA  Oxygen supplementation  RT protocol    Aortic insufficiency- (present on admission)  Assessment & Plan  TTE 01/21/22 showing mod AI    Atrial fibrillation (HCC)- (present on admission)  Assessment & Plan  Hold apixaban for PPM placement  Optimize electrolytes    Elevated troponin- (present on admission)  Assessment & Plan  The patient again denies chest pain today.  Trend    GERD (gastroesophageal reflux disease)- (present on admission)  Assessment & Plan  Continue home omeprazole    Pulmonary hypertension (HCC)- (present on admission)  Assessment & Plan  On home oxygen at night 2/2 CPAP noncompliance.   Pending TTE  Continue home Lasix 40mg daily    Coronary artery disease involving native coronary artery of native heart- (present on admission)  Assessment & Plan  CAD s/p EBENEZER x1 to LCx 09/2018.  Continue ASA/statin    JULIA (obstructive sleep apnea)- (present on admission)  Assessment & Plan  CPAP noncompliance, uses 3L nocturnal.    BPH (benign prostatic hyperplasia)- (present on admission)  Assessment & Plan  Continue finasteride    CKD (chronic kidney disease) stage 3, GFR 30-59 ml/min ([facility])- (present on admission)  Assessment & Plan  GFR 62  Avoid nephrotoxins  Renally dose meds    Hx of pulmonary embolus- (present on admission)  Assessment & Plan  History of DVT/PE 2011  Hold apixaban for PPM placement today    Dyslipidemia- (present on admission)  Assessment & Plan  Continue home atorvastatin    Type 2 diabetes mellitus (HCC)- (present on admission)  Assessment & Plan  A1c 7.3.   Hold home antidiabetic medications.  Correctional insulin avaliable  Hypoglycemia protocol    Primary hypertension- (present on admission)  Assessment & Plan  Continue home losartan, amlodipine, and isosorbide mononitrate      DISPO: ICU pending PPM    CODE STATUS: FULL    Quality Measures:  Feeding: NPO   Analgesia: None  Sedation: None  Thromboprophylaxis: None  Head of bed: >30 degrees  Ulcer prophylaxis: None  Glycemic  control: Available  Bowel care: bowel regimen  Indwelling lines: PIV, pacer wires  Deescalation of antibiotics: None    Alexandra Reveles M.D.

## 2024-02-22 NOTE — CARE PLAN
The patient is Watcher - Medium risk of patient condition declining or worsening    Shift Goals  Clinical Goals: NPO at midnight, maintain hemodynamic stability  Patient Goals: Pacer tomorrow  Family Goals: BELINDA    Progress made toward(s) clinical / shift goals:    Problem: Fall Risk  Goal: Patient will remain free from falls  Outcome: Progressing  Note: Fall precautions in place     Problem: Pain - Standard  Goal: Alleviation of pain or a reduction in pain to the patient’s comfort goal  Outcome: Progressing  Note: Pt reported no pain throughout shift       Patient is not progressing towards the following goals:

## 2024-02-22 NOTE — CARE PLAN
The patient is Stable - Low risk of patient condition declining or worsening    Shift Goals  Clinical Goals: PPM placement, remain pain free, mobilize as desired  Patient Goals: rest until pacemaker placement, eat and rest after  Family Goals: BELINDA    Progress made toward(s) clinical / shift goals:  remains pain free and mobilizing as desired. Plan to transfer out of ICU after PPM    Patient is not progressing towards the following goals:      Problem: Knowledge Deficit - Standard  Goal: Patient and family/care givers will demonstrate understanding of plan of care, disease process/condition, diagnostic tests and medications  Outcome: Progressing     Problem: Fall Risk  Goal: Patient will remain free from falls  Outcome: Progressing     Problem: Respiratory  Goal: Patient will achieve/maintain optimum respiratory ventilation and gas exchange  Outcome: Progressing     Problem: Venous Thromboembolism (VTE) Prevention  Goal: The patient will remain free from venous thromboembolism (VTE)  Outcome: Progressing     Problem: Urinary Elimination  Goal: Establish and maintain regular urinary output  Outcome: Progressing     Problem: Hemodynamics  Goal: Patient's hemodynamics, fluid balance and neurologic status will be stable or improve  Outcome: Progressing     Problem: Pain - Standard  Goal: Alleviation of pain or a reduction in pain to the patient’s comfort goal  Outcome: Progressing

## 2024-02-22 NOTE — CONSULTS
Electrophysiology Initial Consult Note    DOS: 2/22/2024    Referring physician: Dr Aguirre    Chief complaint/Reason for consult: AF with SVR and syncope    HPI: 84 y/o M with longstanding persistent afib. Prior cardiomyopathy, variable EF, 35-50%, remote history of PE. Presented with syncope and noted to have episodes of prolonged asystole in the the ED. Underwent emergent TVP placement. EP consulted for PPM. Pt complains of TVP site irritation otherwise no complaints.    ROS (+ highlighted in bold):  Constitutional: Fevers/chills/fatigue/weightloss  HEENT: Blurry vision/eye pain/sore throat/hearing loss  Respiratory: Shortness of breath/cough  Cardiovascular: Chest pain/palpitations/edema/orthopnea/syncope  GI: Nausea/vomitting/diarrhea  MSK: Arthralgias/myagias/muscle weakness  Skin: Rash/sores  Neurological: Numbness/tremors/vertigo  Endocrine: Excessive thirst/polyuria/cold intolerance/heat intolerance  Psych: Depression/anxiety    Past Medical History:   Diagnosis Date    DVT (deep venous thrombosis) (HCC)     Essential hypertension, malignant     Mixed hyperlipidemia     Nephritis and nephropathy, not specified as acute or chronic, with other specified pathological lesion in kidney, in diseases classified elsewhere     PE (pulmonary embolism)     Type II or unspecified type diabetes mellitus with renal manifestations, not stated as uncontrolled(250.40)     Type II or unspecified type diabetes mellitus without mention of complication, not stated as uncontrolled     Unspecified asthma(493.90)     Unspecified vitamin D deficiency 4/26/2012       History reviewed. No pertinent surgical history.    Social History     Socioeconomic History    Marital status:      Spouse name: Not on file    Number of children: Not on file    Years of education: Not on file    Highest education level: Not on file   Occupational History    Not on file   Tobacco Use    Smoking status: Former     Current packs/day: 0.00      Average packs/day: 1 pack/day for 20.0 years (20.0 ttl pk-yrs)     Types: Cigarettes     Start date: 1954     Quit date: 1974     Years since quittin.2    Smokeless tobacco: Never   Substance and Sexual Activity    Alcohol use: Yes     Alcohol/week: 0.5 oz     Types: 1 drink(s) per week    Drug use: No    Sexual activity: Yes     Partners: Female     Comment: ,retired    Other Topics Concern     Service Not Asked    Blood Transfusions Not Asked    Caffeine Concern Not Asked    Occupational Exposure Not Asked    Hobby Hazards Not Asked    Sleep Concern Not Asked    Stress Concern Not Asked    Weight Concern Not Asked    Special Diet Not Asked    Back Care Not Asked    Exercise Yes     Comment: walks daily 40-45 minutes    Bike Helmet Not Asked    Seat Belt Not Asked    Self-Exams Not Asked   Social History Narrative    Not on file     Social Determinants of Health     Financial Resource Strain: Not on file   Food Insecurity: Not on file   Transportation Needs: Not on file   Physical Activity: Not on file   Stress: Not on file   Social Connections: Not on file   Intimate Partner Violence: Not on file   Housing Stability: Not on file       Family History   Problem Relation Age of Onset    Stroke Mother         brain anuerysm hemorrhage    Heart Disease Father         MI    Hypertension Father     Cancer Brother         Prostate CA       No Known Allergies    Current Facility-Administered Medications   Medication Dose Route Frequency Provider Last Rate Last Admin    BUPIVACAINE HCL (PF) 0.5 % INJ SOLN             LIDOCAINE HCL 2 % INJ SOLN             CEFAZOLIN SODIUM 1 G INJ SOLR             iohexol (OMNIPAQUE) 350 mg/mL  0-100 mL Intravenous Once Cas Iqbal M.D.        FENTANYL CITRATE (PF) 0.05 MG/ML INJ SOLN (WRAPPED)             MIDAZOLAM HCL 1 MG/ML INJ SOLN (WRAPPER)             Respiratory Therapy Consult   Nebulization Continuous RT Richard Still M.D.         ondansetron (Zofran) syringe/vial injection 4 mg  4 mg Intravenous Q4HRS PRN Richard Still M.D.        ondansetron (Zofran ODT) dispertab 4 mg  4 mg Oral Q4HRS PRN Richard Still M.D. MD Alert...ICU Electrolyte Replacement per Pharmacy   Other PHARMACY TO DOSE Richard Still M.D.        insulin regular (HumuLIN R,NovoLIN R) injection  2-9 Units Subcutaneous 4X/DAY ACHS Richard Still M.D.   3 Units at 02/21/24 2040    And    dextrose 50% (D50W) injection 25 g  25 g Intravenous Q15 MIN PRN Richard Still M.D.        amLODIPine (Norvasc) tablet 10 mg  10 mg Oral DAILY Richard Still M.D.   10 mg at 02/22/24 0529    atorvastatin (Lipitor) tablet 40 mg  40 mg Oral Nightly Richard Still M.D.   40 mg at 02/21/24 2042    finasteride (Proscar) tablet 5 mg  5 mg Oral DAILY Richard Still M.D.   5 mg at 02/22/24 0530    isosorbide mononitrate SR (Imdur) tablet 120 mg  120 mg Oral QAM Richard Still M.D.   120 mg at 02/22/24 0600    losartan (Cozaar) tablet 100 mg  100 mg Oral Q EVENING Richard Still M.D.   100 mg at 02/21/24 1802    omeprazole (PriLOSEC) capsule 40 mg  40 mg Oral Daily-0600 Richard Still M.D.   40 mg at 02/22/24 0529    aspirin EC tablet 81 mg  81 mg Oral DAILY Francois Bernard M.D.   81 mg at 02/22/24 0530    furosemide (Lasix) tablet 20 mg  20 mg Oral DAILY Francois Bernard M.D.   20 mg at 02/22/24 0530    menthol ([place]) lozenge 1 Lozenge  1 Lozenge Oral Q EVENING Francois Bernard M.D.   1 Lozenge at 02/21/24 2105       Physical Exam:  Vitals:    02/22/24 1000 02/22/24 1100 02/22/24 1140 02/22/24 1200   BP: 125/71 130/65     Pulse: 70 67 61    Resp: 17 19 15    Temp: 36.1 °C (97 °F)   36.2 °C (97.2 °F)   TempSrc: Temporal   Temporal   SpO2: 95% 94% 95%    Weight:       Height:         General appearance: NAD, conversant   Eyes: anicteric sclerae, moist conjunctivae; no lid-lag; PERRLA  HENT:  "Atraumatic; oropharynx clear with moist mucous membranes and no mucosal ulcerations; normal hard and soft palate  Neck: Trachea midline; FROM, supple, no thyromegaly or lymphadenopathy  Lungs: CTA, with normal respiratory effort and no intercostal retractions  CV: Irregular, no MRGs, no JVD   Abdomen: Soft, non-tender; no masses or HSM  Extremities: No peripheral edema or extremity lymphadenopathy  Skin: Normal temperature, turgor and texture; no rash, ulcers or subcutaneous nodules  Psych: Appropriate affect, alert and oriented to person, place and time    Data:  Lipids:   Lab Results   Component Value Date/Time    CHOLSTRLTOT 123 10/22/2012 04:57 AM    TRIGLYCERIDE 130 10/22/2012 04:57 AM    HDL 39 (A) 10/22/2012 04:57 AM    LDL 58 10/22/2012 04:57 AM        BMP:  Lab Results   Component Value Date/Time    SODIUM 139 02/22/2024 0407    POTASSIUM 4.1 02/22/2024 0407    CHLORIDE 105 02/22/2024 0407    CO2 20 02/22/2024 0407    GLUCOSE 148 (H) 02/22/2024 0407    BUN 18 02/22/2024 0407    CREATININE 1.18 02/22/2024 0407    CALCIUM 8.9 02/22/2024 0407    ANION 14.0 02/22/2024 0407        TSH:   Lab Results   Component Value Date/Time    TSHULTRASEN 3.290 10/22/2012 0457        THYROXINE (T4):   No results found for: \"FREEDIR\"     CBC:   Lab Results   Component Value Date/Time    WBC 10.2 02/22/2024 04:07 AM    RBC 4.90 02/22/2024 04:07 AM    HEMOGLOBIN 14.4 02/22/2024 04:07 AM    HEMATOCRIT 43.6 02/22/2024 04:07 AM    MCV 89.0 02/22/2024 04:07 AM    MCH 29.4 02/22/2024 04:07 AM    MCHC 33.0 02/22/2024 04:07 AM    RDW 50.1 (H) 02/22/2024 04:07 AM    PLATELETCT 186 02/22/2024 04:07 AM    MPV 10.2 02/22/2024 04:07 AM    NEUTSPOLYS 67.60 02/22/2024 04:07 AM    LYMPHOCYTES 18.50 (L) 02/22/2024 04:07 AM    MONOCYTES 10.70 02/22/2024 04:07 AM    EOSINOPHILS 1.90 02/22/2024 04:07 AM    EOSINOPHILS 1 07/30/2021 02:35 PM    BASOPHILS 0.70 02/22/2024 04:07 AM    IMMGRAN 0.60 02/22/2024 04:07 AM    NRBC 0.00 02/22/2024 04:07 AM    " NEUTS 6.90 02/22/2024 04:07 AM    LYMPHS 1.89 02/22/2024 04:07 AM    LYMPHS 38 07/30/2021 02:35 PM    MONOS 1.09 (H) 02/22/2024 04:07 AM    EOS 0.19 02/22/2024 04:07 AM    BASO 0.07 02/22/2024 04:07 AM    IMMGRANAB 0.06 02/22/2024 04:07 AM    NRBCAB 0.00 02/22/2024 04:07 AM        CBC w/o DIFF  Lab Results   Component Value Date/Time    WBC 10.2 02/22/2024 04:07 AM    RBC 4.90 02/22/2024 04:07 AM    HEMOGLOBIN 14.4 02/22/2024 04:07 AM    MCV 89.0 02/22/2024 04:07 AM    MCH 29.4 02/22/2024 04:07 AM    MCHC 33.0 02/22/2024 04:07 AM    RDW 50.1 (H) 02/22/2024 04:07 AM    MPV 10.2 02/22/2024 04:07 AM       Prior echo/stress results reviewed: EF mildly reduced. Prior EF 35%      EKG interpreted by me: AF with LBBB    Impression/Plan:  Complete heart block  Afib longstanding persistent  NICM  Chronic systolic heart failure    - For high grade heart block recommend PPM  - Given heart failure diagnosis recommend CRT-P  - The risk, benefits, and alternatives to pacemaker placement were discussed in great detail, specific risks mentioned including bleeding, infection, cardiac perforation with possible tamponade requiring pericardiocentesis or open heart surgery.  In addition the possibility of lead dislodgment, pneumothorax, hemothorax were discussed. Also mentioned were the possibility of death, stroke, and myocardial infarction. The patient verbalized understanding of these potential complications and wishes to proceed with this procedure.     Plan CRT-P. Will place atrial lead in the event rhythm control of atrial fibrillation is pursued.       Cas Iqbal MD  Cardiac Electrophysiology

## 2024-02-22 NOTE — PROGRESS NOTES
4 Eyes Skin Assessment Completed by LARON Bella and LARON Starr.    Head WDL  Ears WDL  Nose WDL  Mouth WDL  Neck WDL  Breast/Chest WDL  Shoulder Blades WDL  Spine WDL  (R) Arm/Elbow/Hand Bruising  (L) Arm/Elbow/Hand Bruising  Abdomen WDL  Groin Redness, Blanching, Excoriation, and Rash  Scrotum/Coccyx/Buttocks WDL  (R) Leg WDL  (L) Leg WDL  (R) Heel/Foot/Toe Dry/Cracked  (L) Heel/Foot/Toe Dry/Cracked          Devices In Places ECG, Blood Pressure Cuff, Pulse Ox, Central Line, Pacer, and Nasal Cannula      Interventions In Place NC W/Ear Foams, Pillows, Low Air Loss Mattress, Heels Loaded W/Pillows, and Pressure Redistribution Mattress    Possible Skin Injury No    Pictures Uploaded Into Epic N/A  Wound Consult Placed N/A  RN Wound Prevention Protocol Ordered No

## 2024-02-22 NOTE — PROGRESS NOTES
2 RN Skin Assessment Completed by Talon RN and LARON Dejesus.    Head: WDL - redness and blanching near nasal cannula  Ears: WDL  Nose: WDL  Mouth: WDL  Neck: WDL  Breasts/Chest: WDL flaky  Shoulder Blades: WDL  Spine: WDL  (R) Arm/Elbow/hand: redness, blanching, and bruising  (L) Arm/Elbow/hand: redness, blanching, and bruising  Abdomen:WDL   Groin: redness and blanching  Sacrum/Coccyx/Buttocks: redness, blanching, and discoloration  (R) Leg: redness and blanching  (L) Leg: redness and blanching  (R) Heel/Foot/Toe: redness and blanching flaky  (L) Heel/Foot/Toe: redness and blanching flaky          Devices in place: ECG, BP Cuff, Pulse Ox, SCD's, and Pacer    Interventions in place: Gray ear foams, NC with ear foams, NC cheek stickers, Pillows, Q2 turns, Low air loss mattress , and Heels floated with pillows    Possible skin injury found: No    Pictures uploaded into Epic: Yes  Wound Consult Placed: N/A

## 2024-02-22 NOTE — CARE PLAN
The patient is Stable - Low risk of patient condition declining or worsening    Shift Goals  Clinical Goals: monitor heart rhythm,  monitor for pacing    Progress made toward(s) clinical / shift goals:  patient's heart rhythm has been monitored continuously while on the unit. He has intermittent pacing needs.     Patient is not progressing towards the following goals: NA        Problem: Hemodynamics  Goal: Patient's hemodynamics, fluid balance and neurologic status will be stable or improve  Outcome: Met  Note: Patient's hemodynamics have stayed within established range after implantation of temporary pacemaker.      Problem: Respiratory  Goal: Patient will achieve/maintain optimum respiratory ventilation and gas exchange  Note: Patient's oxygen needs have decreased from 4L to 1L nasal cannula and his oxygen saturation has remained greater than 92%.      Problem: Venous Thromboembolism (VTE) Prevention  Goal: The patient will remain free from venous thromboembolism (VTE)  Note: SCDs have been placed on patient to prevent DVTs.

## 2024-02-22 NOTE — OP REPORT
"Electrophysiology Procedure Note  [facility]      Date of procedure: 2/22/2024     Procedure Performed: Placement of BiV pacemaker multipolar, placement of LV lead,  moderate sedation administered by RN and supervised by physician.    Indication: Chronic systolic heart failure, NYHA II, EF <50%, high grade heart block    Physician(s): Cas Iqbal M.D.    Anesthesia: Moderate sedation,  start time 1429, stop time 1510, Versed 2mg, Fentanyl 100mcg  the moderate sedation document has been reviewed, signed and scanned into media     Specimen(s) Removed: None     Estimated Blood Loss:  30cc    Complications: None    Pre Procedure ECG: AF with SVR and LBBB    Post Procedure ECG: BiV paced    DESCRIPTION OF PROCEDURE: After informed written consent, the patient was brought to the electrophysiology lab in the fasting, unsedated state. The patient was prepped and draped in the usual sterile fashion. The procedure was performed under moderate sedation with local anesthetic. A left infraclavicular incision was made with a scalpel and the pre-pectoral device pocket was created using a combination of blunt dissection and electrocautery. The modified Seldinger technique was used to gain access to the left axillary vein times 3. Two peel-away hemostasis sheaths were placed in the vein. Under fluoroscopic guidance, the RV lead and atrial lead were introduced into the heart. The ventricular lead was advanced into the RVOT position the pulled back and advanced to the RV apex. The lead was tested and had satisfactory sensing and pacing parameters. A CS sheath was introduced into the vein over a long 0.35\" [place] wire (TerumAthenix).  The CS was cannulated with the CS sheath over the 0.035\" [place] wire. CS angio showed amenable vein in the lateral LV. The quadripolar lead was placed in the branch with excellent numbers with vectors without phrenic nerve stimulation. The Right atrial lead was placed and tested with good " numbers and fixated with the normal mechanism. High output pacing did not produce extracardiac stimulation in either the RA or RV lead.  The leads were sutured to the underlying pectoral muscle with interrupted silk over a silastic suture sleeve. The device pocket was irrigated with antibiotic solution, inspected, and no bleeding was seen. The leads were connected to the PPM pulse generator and the device was inserted into the pocket. The wound was closed with three layers of absorbable sutures and covered with Steri-Strips.     I personally supervised the administration of moderate sedation by the RN and observed the level of consciousness and physiologic status throughout the procedure.    Following recovery from sedation, the patient was transferred to a monitored bed in good condition.    IMPLANTED DEVICE INFORMATION:    Pulse generator is a MedONtheAIR model W4TR01   Serial number [id number]      LEAD INFORMATION:  1. Right atrial lead is a Medtronic model 5076, serial number [id number], F wave 0.6 millivolts, threshold N/A Volts, pacing impedance 513 Ohms.  2. Right ventricular lead is a Medtronic model 5076, serial number [id number], R wave 14.4 millivolts, threshold 0.5 Volts , pacing impedance 627 Ohms.   3. Left ventricular lead is a Medtronic model 4798, serial number [id number], R wave 5.0 millivolts, threshold 2.25 Volts, pacing impedance 539 Ohms.     DEVICE PROGRAMMING:    VVIR 70    FLUOROSCOPY TIME: 5.3 min    SUMMARY/CONCLUSIONS:  1. Successful BiV pacemaker    RECOMMENDATIONS:  1. Admit to monitored bed.  2. PA and lateral chest x-ray.  3. Implantable cardioverter defibrillator interrogation prior to hospital discharge.  4. Follow-up in device clinic for wound check and device interrogation.

## 2024-02-22 NOTE — PROGRESS NOTES
Cardiology Follow Up Progress Note    Date of Service  2/22/2024    Attending Physician  Patrice Wilder D.O.    Chief Complaint     Syncope & fall found A-fib with slow ventricular rate and prolonged pauses s/p TVP 2/21/2024.    HPI  Oleg Cook is a 83 y.o. male admitted 2/21/2024 with syncope and fall associated with head trauma.    PMH: Cardiomyopathy LVEF 35%, recovered, longstanding A-fib, on OAC with apixaban, COPD on O2 at night, type 2 diabetes, hypertension, dyslipidemia, remote prior PE/DVT      Interim Events  Underwent right IJ transvenous pacing yesterday.  Gvqxzitxt-W-dsp with left bundle branch block, paced rhythm  Keep n.p.o.  Plan for CRT-P this afternoon  Clinically stable without any complaints.  Review of Systems  Review of Systems   Respiratory:  Negative for apnea, cough, choking, chest tightness, shortness of breath, wheezing and stridor.        Vital signs in last 24 hours  Temp:  [35.9 °C (96.6 °F)-36.7 °C (98 °F)] 36 °C (96.8 °F)  Pulse:  [61-87] 65  Resp:  [14-48] 16  BP: (110-159)/() 124/65  SpO2:  [91 %-98 %] 95 %    Physical Exam  Physical Exam  Cardiovascular:      Pulses: Normal pulses.      Comments: Paced rhythm   Pulmonary:      Effort: Pulmonary effort is normal.   Skin:     General: Skin is warm.      Comments: RIJ TVP   Neurological:      Mental Status: He is alert. Mental status is at baseline.   Psychiatric:         Mood and Affect: Mood normal.         Lab Review  Lab Results   Component Value Date/Time    WBC 10.2 02/22/2024 04:07 AM    RBC 4.90 02/22/2024 04:07 AM    HEMOGLOBIN 14.4 02/22/2024 04:07 AM    HEMATOCRIT 43.6 02/22/2024 04:07 AM    MCV 89.0 02/22/2024 04:07 AM    MCH 29.4 02/22/2024 04:07 AM    MCHC 33.0 02/22/2024 04:07 AM    MPV 10.2 02/22/2024 04:07 AM      Lab Results   Component Value Date/Time    SODIUM 139 02/22/2024 04:07 AM    POTASSIUM 4.1 02/22/2024 04:07 AM    CHLORIDE 105 02/22/2024 04:07 AM    CO2 20 02/22/2024 04:07 AM    GLUCOSE 148 (H)  02/22/2024 04:07 AM    BUN 18 02/22/2024 04:07 AM    CREATININE 1.18 02/22/2024 04:07 AM    CREATININE 1.0 05/18/2009 12:13 PM      Lab Results   Component Value Date/Time    ASTSGOT 16 02/21/2024 06:52 AM    ALTSGPT 23 02/21/2024 06:52 AM     Lab Results   Component Value Date/Time    CHOLSTRLTOT 123 10/22/2012 04:57 AM    LDL 58 10/22/2012 04:57 AM    HDL 39 (A) 10/22/2012 04:57 AM    TRIGLYCERIDE 130 10/22/2012 04:57 AM    TROPONINT 84 (H) 02/21/2024 10:49 AM       Recent Labs     02/21/24  0652   NTPROBNP 1257*       Cardiac Imaging and Procedures Review  EKG: A-fib, left bundle branch block      Echocardiogram:   2/21/2024  Low normal left ventricular systolic function. The ejection fraction is   visually estimated to be 50%.   Normal right ventricular size and systolic function.  Mild aortic insufficiency. Pressure half time is 694 msec.  Mild to moderate mitral regurgitation.   The ascending aorta diameter is 3.6 cm.  Compared to the prior study on 01/21/22, LVEF appears improved.      Imaging  Chest X-Ray:   Cardiomegaly and findings suggesting CHF.           CT spine  2/21/2024  Degenerative changes of the cervical spine without acute fracture or malalignment.     CT head  2/21/2024  No evidence of acute territorial infarct, intracranial hemorrhage or mass lesion.      Assessment/Plan    # A-fib slow ventricular rate with prolonged pauses.  # Syncope and fall secondary to above.  # S/p TVP 2/21/2024.  # Echo showed recovered LVEF, 50%,  # Mild to moderate MR  #Type 2 diabetes, A1c 7.3  # Reportedly PCI to LCx, 2008  # Hypertension  # Chronic left bundle branch block      -Appreciate EP consult.  -Plan for CRT-P this afternoon.  -Keep n.p.o.   -Resume Eliquis 5 BID post CRT-P.  -Continue atorvastatin 40.  -Resume home meds post CRT-P.    My total time spent caring for the patient on the day of the encounter was 15 minutes.   This does not include time spent on separately billable procedures/tests.     Thank  you for allowing me to participate in the care of this patient.  I will continue to follow this patient    Please contact me with any questions.    CARINA Hurtado.   Cardiologist, [facility]  [phone]

## 2024-02-23 ENCOUNTER — APPOINTMENT (OUTPATIENT)
Dept: RADIOLOGY | Facility: MEDICAL CENTER | Age: 84
DRG: 242 | End: 2024-02-23
Attending: INTERNAL MEDICINE
Payer: MEDICARE

## 2024-02-23 VITALS
OXYGEN SATURATION: 91 % | TEMPERATURE: 97.9 F | WEIGHT: 203.26 LBS | RESPIRATION RATE: 18 BRPM | BODY MASS INDEX: 27.53 KG/M2 | SYSTOLIC BLOOD PRESSURE: 149 MMHG | DIASTOLIC BLOOD PRESSURE: 91 MMHG | HEIGHT: 72 IN | HEART RATE: 80 BPM

## 2024-02-23 LAB
ANION GAP SERPL CALC-SCNC: 14 MMOL/L (ref 7–16)
BASOPHILS # BLD AUTO: 1.1 % (ref 0–1.8)
BASOPHILS # BLD: 0.1 K/UL (ref 0–0.12)
BUN SERPL-MCNC: 18 MG/DL (ref 8–22)
CALCIUM SERPL-MCNC: 8.6 MG/DL (ref 8.5–10.5)
CHLORIDE SERPL-SCNC: 104 MMOL/L (ref 96–112)
CO2 SERPL-SCNC: 18 MMOL/L (ref 20–33)
CREAT SERPL-MCNC: 1.08 MG/DL (ref 0.5–1.4)
EKG IMPRESSION: NORMAL
EOSINOPHIL # BLD AUTO: 0.25 K/UL (ref 0–0.51)
EOSINOPHIL NFR BLD: 2.8 % (ref 0–6.9)
ERYTHROCYTE [DISTWIDTH] IN BLOOD BY AUTOMATED COUNT: 48.1 FL (ref 35.9–50)
GFR SERPLBLD CREATININE-BSD FMLA CKD-EPI: 69 ML/MIN/1.73 M 2
GLUCOSE BLD STRIP.AUTO-MCNC: 236 MG/DL (ref 65–99)
GLUCOSE SERPL-MCNC: 147 MG/DL (ref 65–99)
HCT VFR BLD AUTO: 44.2 % (ref 42–52)
HGB BLD-MCNC: 14.7 G/DL (ref 14–18)
IMM GRANULOCYTES # BLD AUTO: 0.05 K/UL (ref 0–0.11)
IMM GRANULOCYTES NFR BLD AUTO: 0.6 % (ref 0–0.9)
LYMPHOCYTES # BLD AUTO: 1.59 K/UL (ref 1–4.8)
LYMPHOCYTES NFR BLD: 17.6 % (ref 22–41)
MAGNESIUM SERPL-MCNC: 2 MG/DL (ref 1.5–2.5)
MCH RBC QN AUTO: 29.5 PG (ref 27–33)
MCHC RBC AUTO-ENTMCNC: 33.3 G/DL (ref 32.3–36.5)
MCV RBC AUTO: 88.6 FL (ref 81.4–97.8)
MONOCYTES # BLD AUTO: 1.07 K/UL (ref 0–0.85)
MONOCYTES NFR BLD AUTO: 11.8 % (ref 0–13.4)
NEUTROPHILS # BLD AUTO: 5.97 K/UL (ref 1.82–7.42)
NEUTROPHILS NFR BLD: 66.1 % (ref 44–72)
NRBC # BLD AUTO: 0 K/UL
NRBC BLD-RTO: 0 /100 WBC (ref 0–0.2)
PHOSPHATE SERPL-MCNC: 2.5 MG/DL (ref 2.5–4.5)
PLATELET # BLD AUTO: 158 K/UL (ref 164–446)
PMV BLD AUTO: 10.1 FL (ref 9–12.9)
POTASSIUM SERPL-SCNC: 4.3 MMOL/L (ref 3.6–5.5)
RBC # BLD AUTO: 4.99 M/UL (ref 4.7–6.1)
SODIUM SERPL-SCNC: 136 MMOL/L (ref 135–145)
WBC # BLD AUTO: 9 K/UL (ref 4.8–10.8)

## 2024-02-23 PROCEDURE — 84100 ASSAY OF PHOSPHORUS: CPT

## 2024-02-23 PROCEDURE — 99239 HOSP IP/OBS DSCHRG MGMT >30: CPT | Performed by: HOSPITALIST

## 2024-02-23 PROCEDURE — 700102 HCHG RX REV CODE 250 W/ 637 OVERRIDE(OP): Performed by: STUDENT IN AN ORGANIZED HEALTH CARE EDUCATION/TRAINING PROGRAM

## 2024-02-23 PROCEDURE — 93005 ELECTROCARDIOGRAM TRACING: CPT | Performed by: INTERNAL MEDICINE

## 2024-02-23 PROCEDURE — A9270 NON-COVERED ITEM OR SERVICE: HCPCS | Performed by: STUDENT IN AN ORGANIZED HEALTH CARE EDUCATION/TRAINING PROGRAM

## 2024-02-23 PROCEDURE — 700102 HCHG RX REV CODE 250 W/ 637 OVERRIDE(OP): Performed by: INTERNAL MEDICINE

## 2024-02-23 PROCEDURE — A9270 NON-COVERED ITEM OR SERVICE: HCPCS | Performed by: INTERNAL MEDICINE

## 2024-02-23 PROCEDURE — 71045 X-RAY EXAM CHEST 1 VIEW: CPT

## 2024-02-23 PROCEDURE — 85025 COMPLETE CBC W/AUTO DIFF WBC: CPT

## 2024-02-23 PROCEDURE — 80048 BASIC METABOLIC PNL TOTAL CA: CPT

## 2024-02-23 PROCEDURE — 700111 HCHG RX REV CODE 636 W/ 250 OVERRIDE (IP): Performed by: INTERNAL MEDICINE

## 2024-02-23 PROCEDURE — 83735 ASSAY OF MAGNESIUM: CPT

## 2024-02-23 PROCEDURE — 93010 ELECTROCARDIOGRAM REPORT: CPT | Performed by: INTERNAL MEDICINE

## 2024-02-23 RX ADMIN — ASPIRIN 81 MG: 81 TABLET, COATED ORAL at 05:39

## 2024-02-23 RX ADMIN — WATER 2 G: 100 INJECTION, SOLUTION INTRAVENOUS at 05:40

## 2024-02-23 RX ADMIN — OMEPRAZOLE 40 MG: 20 CAPSULE, DELAYED RELEASE ORAL at 05:39

## 2024-02-23 RX ADMIN — AMLODIPINE BESYLATE 10 MG: 10 TABLET ORAL at 05:39

## 2024-02-23 RX ADMIN — FINASTERIDE 5 MG: 5 TABLET, FILM COATED ORAL at 05:39

## 2024-02-23 RX ADMIN — FUROSEMIDE 20 MG: 20 TABLET ORAL at 05:39

## 2024-02-23 RX ADMIN — ISOSORBIDE MONONITRATE 120 MG: 30 TABLET, EXTENDED RELEASE ORAL at 05:39

## 2024-02-23 ASSESSMENT — ENCOUNTER SYMPTOMS
SHORTNESS OF BREATH: 0
BLOOD IN STOOL: 0
CHEST TIGHTNESS: 0
DIZZINESS: 0
PALPITATIONS: 0
ABDOMINAL PAIN: 0
FEVER: 0

## 2024-02-23 ASSESSMENT — FIBROSIS 4 INDEX: FIB4 SCORE: 1.71

## 2024-02-23 NOTE — PROGRESS NOTES
Bedside report received from off going RN/tech: Quynh, assumed care of patient.     Fall Risk Score: LOW RISK  Fall risk interventions in place: Provide patient/family education based on risk assessment, Educate patient/family to call staff for assistance when getting out of bed, Place fall precaution signage outside patient door, and Place patient in room close to nursing station  Bed type: Regular (Hair Score less than 17 interventions in place)  Patient on cardiac monitor: Yes  IVF/IV medications: Not Applicable   Oxygen: Room Air  Bedside sitter: Not Applicable   Isolation: Not applicable

## 2024-02-23 NOTE — PROGRESS NOTES
Monitor Summary     Rhythm: 100% V-paced  Heart Rate: 67-95  Ectopy: PVC  Measurement: --/.10/.40

## 2024-02-23 NOTE — PROGRESS NOTES
Pt arrived to unit via WC. Pt oriented to room, unit, and plan of care. Tele-monitor placed. All questions answered at this time. Call light within reach, fall precautions in place, will continue to monitor.

## 2024-02-23 NOTE — PROGRESS NOTES
Cardiology Follow Up Progress Note    Date of Service  2/23/2024    Attending Physician  Antonio Lucas M.D.    Chief Complaint   Syncope    EP consulted for PPM    HPI  lOeg Cook is a 83 y.o. male admitted on 2/21/2024 with syncope. Found to be in AF with SVR with long pause in the ER. Underwent emergent TVP implantation.     Other past medical history is pertinent for LVEF 35-50%, persistent AF, LBBB, HTN, DM2 and HLD.     Underwent MDT CRT-P implantation with Dr. Iqbal on 2/22/2024.     Interim Events  No acute events overnight. Patient is anxious to be discharged.  V paced with underlying AF on tele.   PPM site is soft with CDI dressing in place.   CXR this am showed no evidence of late PTX.   Device interrogation this am showed normal sensing and function.   Patient has received 2 doses of IV Ancef.     Review of Systems  Review of Systems   Constitutional:  Negative for fever.   Respiratory:  Negative for chest tightness and shortness of breath.    Cardiovascular:  Negative for chest pain, palpitations and leg swelling.   Gastrointestinal:  Negative for abdominal pain and blood in stool.   Genitourinary:  Negative for hematuria.   Musculoskeletal:  Negative for gait problem.   Neurological:  Negative for dizziness and syncope.   All other systems reviewed and are negative.      Vital signs in last 24 hours  Temp:  [36.1 °C (97 °F)-36.8 °C (98.2 °F)] 36.6 °C (97.9 °F)  Pulse:  [61-88] 80  Resp:  [] 18  BP: (121-159)/(65-95) 149/91  SpO2:  [82 %-97 %] 91 %    Physical Exam  Physical Exam  Constitutional:       General: He is not in acute distress.     Appearance: Normal appearance.   HENT:      Head: Normocephalic.   Eyes:      Extraocular Movements: Extraocular movements intact.   Cardiovascular:      Rate and Rhythm: Normal rate and regular rhythm.      Pulses: Normal pulses.      Heart sounds: Normal heart sounds. No murmur heard.  Pulmonary:      Effort: Pulmonary effort is normal.      Breath  sounds: Normal breath sounds.   Chest:      Comments: PPM site is soft with CDI dressing in place.   Musculoskeletal:         General: No swelling.      Cervical back: Normal range of motion.      Right lower leg: No edema.      Left lower leg: No edema.   Skin:     General: Skin is warm and dry.   Neurological:      Mental Status: He is alert and oriented to person, place, and time.   Psychiatric:         Mood and Affect: Mood normal.         Thought Content: Thought content normal.         Judgment: Judgment normal.         Lab Review  Lab Results   Component Value Date/Time    WBC 9.0 02/23/2024 02:43 AM    RBC 4.99 02/23/2024 02:43 AM    HEMOGLOBIN 14.7 02/23/2024 02:43 AM    HEMATOCRIT 44.2 02/23/2024 02:43 AM    MCV 88.6 02/23/2024 02:43 AM    MCH 29.5 02/23/2024 02:43 AM    MCHC 33.3 02/23/2024 02:43 AM    MPV 10.1 02/23/2024 02:43 AM      Lab Results   Component Value Date/Time    SODIUM 136 02/23/2024 02:43 AM    POTASSIUM 4.3 02/23/2024 02:43 AM    CHLORIDE 104 02/23/2024 02:43 AM    CO2 18 (L) 02/23/2024 02:43 AM    GLUCOSE 147 (H) 02/23/2024 02:43 AM    BUN 18 02/23/2024 02:43 AM    CREATININE 1.08 02/23/2024 02:43 AM    CREATININE 1.0 05/18/2009 12:13 PM      Lab Results   Component Value Date/Time    ASTSGOT 16 02/21/2024 06:52 AM    ALTSGPT 23 02/21/2024 06:52 AM     Lab Results   Component Value Date/Time    CHOLSTRLTOT 123 10/22/2012 04:57 AM    LDL 58 10/22/2012 04:57 AM    HDL 39 (A) 10/22/2012 04:57 AM    TRIGLYCERIDE 130 10/22/2012 04:57 AM    TROPONINT 84 (H) 02/21/2024 10:49 AM       Recent Labs     02/21/24  0652   NTPROBNP 1257*       Cardiac Imaging and Procedures Review  TTE 2/21/2024:  CONCLUSIONS  Low normal left ventricular systolic function. The ejection fraction is   visually estimated to be 50%.   Normal right ventricular size and systolic function.  Mild aortic insufficiency. Pressure half time is 694 msec.  Mild to moderate mitral regurgitation.   The ascending aorta diameter is  3.6 cm.  Compared to the prior study on 01/21/22, LVEF appears improved. No   other significant changes when compared side by side.       Assessment/Plan  1. S/P Permanent Pacemaker  - S/P successful MDT CRT-P implantation due to AF with SVR, pauses and h/o LBBB and HFrEF.   - CXR shows no late signs of PTX, leads appear to be in correct placement.   - EKG and telemetry monitor shows V paced rhythm, no acute findings.   - Device interrogation today showed normal sensing and function.  - Left upper pacemaker site CDI, uncomplicated.   - Patient has ambulated in hallway without difficulties.  - Discussed pacemaker discharge education and care with patient at bedside per below. All pt questions/concerns were answered, patient verbalized understanding.   - Patient will follow up with pacemaker clinic in 1 week for pacemaker check.        Thank you for allowing me to participate in the care of this patient.    EP will sign off. Close follow up arranged as below:    Future Appointments   Date Time Provider Department Center   2/29/2024  1:45 PM PACER CHECK-[facility] None       Please contact me with any questions.    CHARISMA Ordoñez.   Cardiologist, [facility]  [phone]

## 2024-02-23 NOTE — CONSULTS
Hospital Medicine Consultation    Date of Service  2/22/2024    Referring Physician  Patrice Wilder D.O.    Consulting Physician  Antonio Lucas M.D.    Reason for Consultation  bradycardia    History of Presenting Illness  83 y.o. male who presented 2/21/2024 with syncope.  Mr. Cook has a past medical history of atrial fibrillation on Eliquis therapy, coronary artery disease with previous stent to the circumflex 2018, as well as hypertension and non-insulin-dependent diabetes mellitus on metformin that usually goes to the [facility] for [facility] care.  He had a syncopal episode and paramedics were called and apparently had up to 10 seconds of asystole.  He was brought to the emergency room where he was bradycardic.  He went emergently to the cardiac Cath Lab and had a right sided temporary pacemaker placed and was admitted to the ICU in guarded condition.  On 2/22/2024 he had a permanent pacemaker placed by Dr. Rasheed quiñonez.  His ejection fraction was found to be 50% by echocardiogram.    Review of Systems  Review of Systems   Constitutional:  Negative for chills and fever.   Cardiovascular:         Right chest pain at the pacer site.   Neurological:  Negative for dizziness.   All other systems reviewed and are negative.      Past Medical History   has a past medical history of DVT (deep venous thrombosis) (HCC), Essential hypertension, malignant, Mixed hyperlipidemia, Nephritis and nephropathy, not specified as acute or chronic, with other specified pathological lesion in kidney, in diseases classified elsewhere, PE (pulmonary embolism), Type II or unspecified type diabetes mellitus with renal manifestations, not stated as uncontrolled(250.40), Type II or unspecified type diabetes mellitus without mention of complication, not stated as uncontrolled, Unspecified asthma(493.90), and Unspecified vitamin D deficiency (4/26/2012).    Surgical History   has no past surgical history on file.    Family  History  family history includes Cancer in his brother; Heart Disease in his father; Hypertension in his father; Stroke in his mother.    Social History   reports that he quit smoking about 49 years ago. His smoking use included cigarettes. He started smoking about 69 years ago. He has a 20 pack-year smoking history. He has never used smokeless tobacco. He reports current alcohol use of about 0.5 oz of alcohol per week. He reports that he does not use drugs.  He lives with his wife, daughter, and grandchildren    Medications  Prior to Admission Medications   Prescriptions Last Dose Informant Patient Reported? Taking?   Alogliptin Benzoate 12.5 MG Tab unk at [facility] Patient's Home Pharmacy Yes No   Sig: Take 12.5 mg by mouth every evening.   Empagliflozin 25 MG Tab unk at [facility] Patient's Home Pharmacy Yes No   Sig: Take 12.5 mg by mouth every morning. 1/2 tablet = 12.5 mg.   amLODIPine (NORVASC) 10 MG Tab unk at [facility] Patient's Home Pharmacy Yes No   Sig: Take 10 mg by mouth every day.   apixaban (ELIQUIS) 5mg Tab unk at [facility] Patient's Home Pharmacy Yes No   Sig: Take 5 mg by mouth 2 times a day.   atorvastatin (LIPITOR) 40 MG Tab unk at [facility] Patient's Home Pharmacy Yes No   Sig: Take 40 mg by mouth every evening.   carvedilol (COREG) 12.5 MG Tab unk at [facility] Patient's Home Pharmacy Yes No   Sig: Take 12.5 mg by mouth 2 times a day.   finasteride (PROSCAR) 5 MG Tab unk at [facility] Patient's Home Pharmacy Yes No   Sig: Take 5 mg by mouth every day.   furosemide (LASIX) 20 MG Tab unk at [facility] Patient's Home Pharmacy No No   Sig: Take 2 Tablets by mouth every day.   isosorbide mononitrate SR (IMDUR) 60 MG TABLET SR 24 HR unk at [facility] Patient's Home Pharmacy Yes No   Sig: Take 120 mg by mouth every morning. 2 tablets = 120 mg.   losartan (COZAAR) 100 MG Tab unk at [facility] Patient's Home Pharmacy Yes No   Sig: Take 100 mg by mouth every evening.   metformin (GLUCOPHAGE) 1000 MG tablet unk at [facility] Patient's Home Pharmacy Yes No   Sig: Take 1,000 mg  by mouth 2 times a day.   omeprazole (PRILOSEC) 40 MG delayed-release capsule unk at [facility] Patient's Home Pharmacy Yes No   Sig: Take 40 mg by mouth 2 times daily, before breakfast and dinner.   potassium Chloride ER (K-TAB) 20 MEQ Tab CR tablet unk at [facility] Patient's Home Pharmacy Yes Yes   Sig: Take 20 mEq by mouth 2 times a day.      Facility-Administered Medications: None       Allergies  No Known Allergies    Physical Exam  Temp:  [36 °C (96.8 °F)-36.7 °C (98 °F)] 36.4 °C (97.5 °F)  Pulse:  [61-87] 76  Resp:  [15-32] 31  BP: (110-156)/() 145/77  SpO2:  [90 %-96 %] 90 %    Physical Exam  Vitals and nursing note reviewed.   Constitutional:       General: He is not in acute distress.     Appearance: He is not toxic-appearing.   Cardiovascular:      Rate and Rhythm: Normal rate and regular rhythm.      Comments: Left chest pacemaker site covered.  Abdominal:      General: There is no distension.      Tenderness: There is no abdominal tenderness.   Musculoskeletal:      Right lower leg: No edema.      Left lower leg: No edema.   Neurological:      General: No focal deficit present.      Mental Status: He is oriented to person, place, and time.   Psychiatric:         Mood and Affect: Mood normal.         Behavior: Behavior normal.         Fluids  Date 02/22/24 0700 - 02/23/24 0659   Shift 2345-2220 4156-6028 1179-6489 24 Hour Total   INTAKE   P.O.  240  240   Shift Total  240  240   OUTPUT   Urine 500   500   Shift Total 500   500   Weight (kg) 95 95 95 95       Laboratory  Recent Labs     02/21/24  0652 02/22/24  0407   WBC 13.7* 10.2   RBC 5.66 4.90   HEMOGLOBIN 16.5 14.4   HEMATOCRIT 50.1 43.6   MCV 88.5 89.0   MCH 29.2 29.4   MCHC 32.9 33.0   RDW 48.3 50.1*   PLATELETCT 234 186   MPV 10.6 10.2     Recent Labs     02/21/24  0652 02/22/24  0407   SODIUM 138 139   POTASSIUM 3.8 4.1   CHLORIDE 103 105   CO2 17* 20   GLUCOSE 245* 148*   BUN 22 18   CREATININE 1.41* 1.18   CALCIUM 8.5 8.9     Recent Labs      02/21/24  0652   APTT 29.4   INR 1.54*                 Imaging  DX-CHEST-PORTABLE (1 VIEW)   Final Result      1.  Interval placement of RIGHT neck catheter and LEFT subclavian cardiac pacing device   2.  No visible pneumothorax   3.  Small LEFT pleural effusion   4.  Persistently enlarged cardiac silhouette   5.  Bibasilar underinflation atelectasis which could obscure an additional process.      EC-ECHOCARDIOGRAM COMPLETE W/O CONT   Final Result      CT-CSPINE WITHOUT PLUS RECONS   Final Result      Degenerative changes of the cervical spine without acute fracture or malalignment.      CT-HEAD W/O   Final Result      1.  No evidence of acute territorial infarct, intracranial hemorrhage or mass lesion.   2.  Mild diffuse cerebral substance loss.   3.  Mild microangiopathic ischemic change versus demyelination or gliosis.   4.  Partially calcified inspissated material within the right sphenoid sinus which may related to chronic and/or fungal infection.         CL-TEMPORARY PACEMAKER INSERT    (Results Pending)   CL-BIV PERMANENT PACEMAKER GENERATOR CHANGE    (Results Pending)       Assessment/Plan  * Bradycardia- (present on admission)  Assessment & Plan  Syncope with bradycardia and apparently 10-second pause  He had been on Coreg though this was not new for him.  He had a emergent right-sided temporary catheter placed and Eliquis was held.  On 2/22/2024 he had a permanent pacemaker on the left side.  He is currently in a paced rhythm    Coronary artery disease involving native coronary artery of native heart- (present on admission)  Assessment & Plan  CAD s/p EBENEZER x1 to LCx 09/2018.  Continue ASA/statin    Atrial fibrillation (HCC)- (present on admission)  Assessment & Plan  Hold apixaban for PPM placement  Optimize electrolytes    Chronic systolic congestive heart failure (HCC)- (present on admission)  Assessment & Plan  Previous echocardiogram had revealed a low ejection fraction of 35% though repeat  echocardiogram today shows a normal ejection fraction of 50%.  Outpatient goal-directed medical therapy will be restarted accordingly    JULIA (obstructive sleep apnea)- (present on admission)  Assessment & Plan  CPAP noncompliance, uses 3L nocturnal.    BPH (benign prostatic hyperplasia)- (present on admission)  Assessment & Plan  Continue finasteride    CKD (chronic kidney disease) stage 3, GFR 30-59 ml/min ([facility])- (present on admission)  Assessment & Plan  Creatinine 1.1    Hx of pulmonary embolus- (present on admission)  Assessment & Plan  History of DVT/PE 2011  Restart Eliquis when okay with electrophysiology    Chronic respiratory failure with hypoxia (HCC)- (present on admission)  Assessment & Plan  Uses 3L at night in lieu of CPAP for JULIA  Oxygen supplementation  RT protocol    Dyslipidemia- (present on admission)  Assessment & Plan  Continue home atorvastatin    Type 2 diabetes mellitus (HCC)- (present on admission)  Assessment & Plan  A1c 7.3.   Hold home Alogliptin, empagliflozin, and metformin  Correctional insulin avaliable  Hypoglycemia protocol    Primary hypertension- (present on admission)  Assessment & Plan  Continue home losartan, amlodipine, and isosorbide mononitrate    High anion gap metabolic acidosis- (present on admission)  Assessment & Plan  Resolved  LA WNL

## 2024-02-23 NOTE — RESPIRATORY CARE
"  COPD EDUCATION by COPD CLINICAL EDUCATOR  2/23/2024 at 10:02 AM by Ashlie Fitzgerald, RRT     Patient interviewed by COPD education team. Patient refused COPD program at this time. An Action Plan was completed in the EMR to reflect current Respiratory Medication use.                   COPD Screen  COPD Risk Screening  Do you have a history of COPD?: No  COPD Population Screener  During the past 4 weeks, how much did you feel short of breath?: Some of the time  Do you ever cough up any mucus or phlegm?: No/only with occasional colds or infections  In the past 12 months, you do less than you used to because of your breathing problems: Agree (since covid)  Have you smoked at least 100 cigarettes in your entire life?: Yes  How old are you?: 60+  COPD Screening Score: 6  COPD Coordinator Not Recommended: Yes    COPD Assessment  COPD Clinical Specialists ONLY  COPD Education Initiated: Yes--Short Intervention (refused Im headed Home: seen by team prior admit denies COPDhx quit >[facility] years ago [facility])  Is this a COPD exacerbation patient?: No  DME Company: [employer]  DME Equipment Type: 3lpm at night  Interdisciplinary Rounds: Attendance at Rounds (30 Min)    PFT Results  No results found for: \"PFT\"    Meds to Beds        MY COPD ACTION PLAN     It is recommended that patients and physicians /healthcare providers complete this action plan together. This plan should be discussed at each physician visit and updated as needed.    The green, yellow and red zones show groups of symptoms of COPD. This list of symptoms is not comprehensive, and you may experience other symptoms. In the \"Actions\" column, your healthcare provider has recommended actions for you to take based on your symptoms.    Patient Name: Oleg Cook   YOB: 1940   Last Updated on: 7/28/2021  9:41 AM   Green Zone:  I am doing well today Actions     Usual activitiy and exercise level   Take daily medications     Usual amounts of cough and " "phlegm/mucus   Use oxygen as prescribed     Sleep well at night   Continue regular exercise/diet plan     Appetite is good   At all times avoid cigarette smoke, inhaled irritants     Daily Medications (these medications are taken every day):                Yellow Zone:  I am having a bad day or a COPD flare Actions     More breathless than usual   Continue daily medications     I have less energy for my daily activities   Use quick relief inhaler as ordered     Increased or thicker phlegm/mucus   Use oxygen as prescribed     Using quick relief inhaler/nebulizer more often   Get plenty of rest     Swelling of ankles more than usual   Use pursed lip breathing     More coughing than usual   At all times avoid cigarette smoke, inhaled irritants     I feel like I have a \"chest cold\"     Poor sleep and my symptoms woke me up     My appetite is not good     My medicine is not helping      Call provider immediately if symptoms don’t improve     Continue daily medications, add rescue medications:   Albuterol 2 Puffs Every 4 hours PRN       Medications to be used during a flare up, (as Discussed with Provider):           Additional Information:  Use with spacer    Red Zone:  I need urgent medical care Actions     Severe shortness of breath even at rest   Call 911 or seek medical care immediately     Not able to do any activity because of breathing      Fever or shaking chills      Feeling confused or very drowsy       Chest pains      Coughing up blood                  "

## 2024-02-23 NOTE — DISCHARGE SUMMARY
Discharge Summary    CHIEF COMPLAINT ON ADMISSION  Chief Complaint   Patient presents with    Irregular Heart Beat     When EMS arrived, pt was placed on the monitor and had long pauses noticed on his EKG where pt would go unresponsive for 5 seconds before answering     Fall     Pt attempted to get up and use the restroom this morning when he became nauseous and had a MGLF, + headstrike, + LOC, + blood thinners       Reason for Admission  EMS     Admission Date  2/21/2024    CODE STATUS  Full Code    HPI & HOSPITAL COURSE  This is a 83 y.o. male here with syncope.   Mr. Cook has a past medical history of atrial fibrillation on Eliquis therapy, coronary artery disease with previous stent to the circumflex 2018, as well as hypertension and non-insulin-dependent diabetes mellitus on metformin that usually goes to the [facility] for his care.  He had a syncopal episode and paramedics were called and apparently had up to 10 seconds of asystole.  He was brought to the emergency room where he was bradycardic.  He went emergently to the cardiac Cath Lab and had a right sided temporary pacemaker placed and was admitted to the ICU in guarded condition.  On 2/22/2024 he had a permanent pacemaker placed by Dr. Rasheed quiñonez.  His ejection fraction was found to be 50% by echocardiogram.     2/23: Mr. Cook was evaluated on the tele floor. He is feeling good this morning. His pacemaker was interrogated.  He has been cleared by the electrophysiology service to go home today and he can start his Eliquis tonight.  He has 2 walkers and a cane at home as he has limitations in movement of the left arm with respect to heavy lifting and going above his head.  He lives with his wife and daughter and has help at home.  No changes will be made in his home regimen.    Therefore, he is discharged in good and stable condition to home with close outpatient follow-up.    The patient met 2-midnight criteria for an inpatient  stay at the time of discharge.    Discharge Date  2/23    FOLLOW UP ITEMS POST DISCHARGE  He will follow-up with the electrophysiology service and at the [facility] where he usually goes for care    DISCHARGE DIAGNOSES  Principal Problem:    Bradycardia (POA: Yes)  Active Problems:    Atrial fibrillation (HCC) (POA: Yes)    Coronary artery disease involving native coronary artery of native heart (POA: Yes)    Primary hypertension (POA: Yes)    Type 2 diabetes mellitus (HCC) (POA: Yes)      Overview: 3/16/11: Microalbumin/Cr ratio: 13    Dyslipidemia (POA: Yes)      Overview: 3/16/11: 126/91/42/66    Chronic respiratory failure with hypoxia (HCC) (POA: Yes)    Hx of pulmonary embolus (POA: Yes)    CKD (chronic kidney disease) stage 3, GFR 30-59 ml/min (HCC) (POA: Yes)    BPH (benign prostatic hyperplasia) (POA: Yes)    JULIA (obstructive sleep apnea) (POA: Yes)    Chronic systolic congestive heart failure (HCC) (POA: Yes)    High anion gap metabolic acidosis (POA: Yes)  Resolved Problems:    COPD (chronic obstructive pulmonary disease) (HCC) (POA: Yes)    Pulmonary hypertension (HCC) (POA: Yes)    Aortic insufficiency (POA: Yes)    GERD (gastroesophageal reflux disease) (POA: Yes)      Overview: -Continue home omeprazole    Elevated troponin (POA: Yes)      FOLLOW UP  Future Appointments   Date Time Provider Department Center   2/29/2024  1:45 PM PACER CHECK-[facility] None     Cas Iqbal M.D.  [address]  [address]  [facility] [id number]  [phone]    Schedule an appointment as soon as possible for a visit      [place] [id number] [facility] [id number] [phone]  Schedule an appointment as soon as possible for a visit        MEDICATIONS ON DISCHARGE     Medication List        CONTINUE taking these medications        Instructions   Alogliptin Benzoate 12.5 MG Tabs   Take 12.5 mg by mouth every evening.  Dose: 12.5 mg     amLODIPine 10 MG  Tabs  Commonly known as: Norvasc   Take 10 mg by mouth every day.  Dose: 10 mg     apixaban 5mg Tabs  Commonly known as: Eliquis   Take 5 mg by mouth 2 times a day.  Dose: 5 mg     atorvastatin 40 MG Tabs  Commonly known as: Lipitor   Take 40 mg by mouth every evening.  Dose: 40 mg     carvedilol 12.5 MG Tabs  Commonly known as: Coreg   Take 12.5 mg by mouth 2 times a day.  Dose: 12.5 mg     Empagliflozin 25 MG Tabs   Take 12.5 mg by mouth every morning. 1/2 tablet = 12.5 mg.  Dose: 12.5 mg     finasteride 5 MG Tabs  Commonly known as: Proscar   Take 5 mg by mouth every day.  Dose: 5 mg     furosemide 20 MG Tabs  Commonly known as: Lasix   Take 2 Tablets by mouth every day.  Dose: 40 mg     isosorbide mononitrate SR 60 MG Tb24  Commonly known as: Imdur   Take 120 mg by mouth every morning. 2 tablets = 120 mg.  Dose: 120 mg     losartan 100 MG Tabs  Commonly known as: Cozaar   Take 100 mg by mouth every evening.  Dose: 100 mg     metformin 1000 MG tablet  Commonly known as: Glucophage   Take 1,000 mg by mouth 2 times a day.  Dose: 1,000 mg     omeprazole 40 MG delayed-release capsule  Commonly known as: PriLOSEC   Take 40 mg by mouth 2 times daily, before breakfast and dinner.  Dose: 40 mg     potassium Chloride ER 20 MEQ Tbcr tablet  Commonly known as: K-Tab   Take 20 mEq by mouth 2 times a day.  Dose: 20 mEq              Allergies  No Known Allergies    DIET  Orders Placed This Encounter   Procedures    Diet Order Diet: Cardiac     Standing Status:   Standing     Number of Occurrences:   1     Order Specific Question:   Diet:     Answer:   Cardiac [6]       ACTIVITY    He has been given the limitations with respect to the left arm due to the pacemaker.    CONSULTATIONS  Electrophysiology    PROCEDURES  Permanent pacemaker by Dr. Iqbal on 2/22/2024    LABORATORY  Lab Results   Component Value Date    SODIUM 136 02/23/2024    POTASSIUM 4.3 02/23/2024    CHLORIDE 104 02/23/2024    CO2 18 (L) 02/23/2024    GLUCOSE 147  (H) 02/23/2024    BUN 18 02/23/2024    CREATININE 1.08 02/23/2024    CREATININE 1.0 05/18/2009        Lab Results   Component Value Date    WBC 9.0 02/23/2024    HEMOGLOBIN 14.7 02/23/2024    HEMATOCRIT 44.2 02/23/2024    PLATELETCT 158 (L) 02/23/2024      DX-CHEST-PORTABLE (1 VIEW)   Final Result      No acute process. Heart is enlarged.      DX-CHEST-PORTABLE (1 VIEW)   Final Result      1.  Interval placement of RIGHT neck catheter and LEFT subclavian cardiac pacing device   2.  No visible pneumothorax   3.  Small LEFT pleural effusion   4.  Persistently enlarged cardiac silhouette   5.  Bibasilar underinflation atelectasis which could obscure an additional process.      EC-ECHOCARDIOGRAM COMPLETE W/O CONT   Final Result      CT-CSPINE WITHOUT PLUS RECONS   Final Result      Degenerative changes of the cervical spine without acute fracture or malalignment.      CT-HEAD W/O   Final Result      1.  No evidence of acute territorial infarct, intracranial hemorrhage or mass lesion.   2.  Mild diffuse cerebral substance loss.   3.  Mild microangiopathic ischemic change versus demyelination or gliosis.   4.  Partially calcified inspissated material within the right sphenoid sinus which may related to chronic and/or fungal infection.         CL-TEMPORARY PACEMAKER INSERT    (Results Pending)   CL-BIV PERMANENT PACEMAKER GENERATOR CHANGE    (Results Pending)   Echo:  CONCLUSIONS  Low normal left ventricular systolic function. The ejection fraction is   visually estimated to be 50%.   Normal right ventricular size and systolic function.  Mild aortic insufficiency. Pressure half time is 694 msec.  Mild to moderate mitral regurgitation.   The ascending aorta diameter is 3.6 cm.  Compared to the prior study on 01/21/22, LVEF appears improved. No   other significant changes when compared side by side.      Total time of the discharge process exceeds 32 minutes.

## 2024-02-23 NOTE — PROGRESS NOTES
4 Eyes Skin Assessment Completed by LARON Green and LARON Sharif.    Head Blanching and Redness  Ears Redness and Blanching  Nose WDL  Mouth WDL  Neck WDL  Breast/Chest Redness,Blanching, gauze and tegaderm over pacer placement L chest  Shoulder Blades WDL  Spine WDL  (R) Arm/Elbow/Hand Redness, Blanching, and Bruising  (L) Arm/Elbow/Hand Redness, Blanching, and Bruising  Abdomen WDL  Groin Redness and Excoriation  Scrotum/Coccyx/Buttocks Redness and Blanching  (R) Leg Bruising  (L) Leg Bruising  (R) Heel/Foot/Toe Redness, Blanching, Callused, and Flaky  (L) Heel/Foot/Toe Redness, Blanching, Callused, and Flaky      Devices In Places Tele Box, Blood Pressure Cuff, Pulse Ox, and Pacer, and L Arm Sling      Interventions In Place Heel Mepilex, Sacral Mepilex, Pillows, Heels Loaded W/Pillows, and Pressure Redistribution Mattress    Possible Skin Injury No    Pictures Uploaded Into Epic Yes  Wound Consult Placed N/A  RN Wound Prevention Protocol Ordered Yes

## 2024-02-23 NOTE — DISCHARGE INSTRUCTIONS
Pacemaker Discharge Instructions/[facility]    1.  No showers until seen in follow up; may take sponge bath.  Keep dressing dry & in place until seen at for you follow up visit at the cardiology office.     2.  No lifting over 10 lbs with affected arm for six weeks.  3.  Do not raise affected arm above shoulder level or behind head for six weeks.  4.  Avoid excessive pushing, pulling, or twisting for six weeks.  5.  No driving for the first week.  6.  Ok to place indirect ICE pack to site for comfort.   7.  Call our office ([phone]) if you notice any increased swelling, redness, warmth, or drainage at the implant site.  8.  Needs to be seen in emergency if you develop fever > 101F or uncontrolled pain.  9.  Always check with device clinic before any planned MRI to see if device is MRI compatible.  10.  No routine dental work or cleanings for 3 months.  11.  May remove arm sling after one day, but please wear if you have trouble remembering to keep your arm down.  Please wear at night as a reminder.   12. Do not place cell phones or mobile devices directly over implanted device.   13. You will need to be seen at least twice in the device clinic for checks while the pacemaker is healing.  Expect appointment approximately one week after implant and 6-7 weeks post implant.

## 2024-02-23 NOTE — PROGRESS NOTES
Monitor summary:  AF/partially paced start of shift, HR 70-90s. Since cath lab, 100% v paced HR 70-90s

## 2024-02-29 ENCOUNTER — NON-PROVIDER VISIT (OUTPATIENT)
Dept: CARDIOLOGY | Facility: MEDICAL CENTER | Age: 84
End: 2024-02-29

## 2024-02-29 ENCOUNTER — NON-PROVIDER VISIT (OUTPATIENT)
Dept: CARDIOLOGY | Facility: MEDICAL CENTER | Age: 84
End: 2024-02-29
Attending: HOSPITALIST
Payer: MEDICARE

## 2024-02-29 DIAGNOSIS — I50.22 CHRONIC SYSTOLIC CONGESTIVE HEART FAILURE (HCC): ICD-10-CM

## 2024-02-29 DIAGNOSIS — Z95.0 CARDIAC RESYNCHRONIZATION THERAPY PACEMAKER (CRT-P) IN PLACE: ICD-10-CM

## 2024-02-29 DIAGNOSIS — R00.1 BRADYCARDIA: ICD-10-CM

## 2024-02-29 PROCEDURE — 93281 PM DEVICE PROGR EVAL MULTI: CPT | Mod: 26 | Performed by: INTERNAL MEDICINE

## 2024-02-29 PROCEDURE — 93281 PM DEVICE PROGR EVAL MULTI: CPT | Performed by: INTERNAL MEDICINE

## 2024-02-29 NOTE — PROGRESS NOTES
Wound site is healing well. Pt advised to watch for increased redness, swelling, oozing or fever. Pt verbalized understanding.See flowsheet.    Billing and remote monitoring explained and acknowledged. [facility] and [facility] followed [facility], will schedule a 5-6 week wound check with their device department.

## 2024-02-29 NOTE — CARDIAC REMOTE MONITOR - SCAN
Device transmission reviewed. Device demonstrated appropriate function.       Electronically Signed by: Richard Rodgers M.D.    2/29/2024  3:15 PM

## 2024-04-04 ENCOUNTER — TELEPHONE (OUTPATIENT)
Dept: CARDIOLOGY | Facility: MEDICAL CENTER | Age: 84
End: 2024-04-04
Payer: COMMERCIAL